# Patient Record
Sex: MALE | Race: WHITE | Employment: OTHER | URBAN - NONMETROPOLITAN AREA
[De-identification: names, ages, dates, MRNs, and addresses within clinical notes are randomized per-mention and may not be internally consistent; named-entity substitution may affect disease eponyms.]

---

## 2017-01-01 ENCOUNTER — OFFICE VISIT (OUTPATIENT)
Dept: INTERNAL MEDICINE | Age: 77
End: 2017-01-01
Payer: MEDICARE

## 2017-01-01 ENCOUNTER — TELEPHONE (OUTPATIENT)
Dept: INTERNAL MEDICINE | Age: 77
End: 2017-01-01

## 2017-01-01 ENCOUNTER — OFFICE VISIT (OUTPATIENT)
Dept: URGENT CARE | Age: 77
End: 2017-01-01
Payer: MEDICARE

## 2017-01-01 VITALS
BODY MASS INDEX: 24.14 KG/M2 | TEMPERATURE: 97.7 F | WEIGHT: 182.13 LBS | SYSTOLIC BLOOD PRESSURE: 167 MMHG | DIASTOLIC BLOOD PRESSURE: 68 MMHG | HEIGHT: 73 IN | HEART RATE: 61 BPM | OXYGEN SATURATION: 96 % | RESPIRATION RATE: 18 BRPM

## 2017-01-01 VITALS
RESPIRATION RATE: 20 BRPM | DIASTOLIC BLOOD PRESSURE: 60 MMHG | HEIGHT: 73 IN | WEIGHT: 183 LBS | SYSTOLIC BLOOD PRESSURE: 154 MMHG | OXYGEN SATURATION: 97 % | BODY MASS INDEX: 24.25 KG/M2 | HEART RATE: 67 BPM

## 2017-01-01 DIAGNOSIS — D64.9 LOW HEMOGLOBIN: Primary | ICD-10-CM

## 2017-01-01 DIAGNOSIS — H81.10 BENIGN POSITIONAL VERTIGO, UNSPECIFIED LATERALITY: ICD-10-CM

## 2017-01-01 DIAGNOSIS — Z79.4 TYPE 2 DIABETES MELLITUS WITHOUT COMPLICATION, WITH LONG-TERM CURRENT USE OF INSULIN (HCC): ICD-10-CM

## 2017-01-01 DIAGNOSIS — E11.9 TYPE 2 DIABETES MELLITUS WITHOUT COMPLICATION, WITH LONG-TERM CURRENT USE OF INSULIN (HCC): ICD-10-CM

## 2017-01-01 DIAGNOSIS — E78.5 HYPERLIPIDEMIA, UNSPECIFIED HYPERLIPIDEMIA TYPE: ICD-10-CM

## 2017-01-01 DIAGNOSIS — E78.2 MIXED HYPERLIPIDEMIA: Chronic | ICD-10-CM

## 2017-01-01 DIAGNOSIS — E11.9 TYPE 2 DIABETES MELLITUS WITHOUT COMPLICATION, WITHOUT LONG-TERM CURRENT USE OF INSULIN (HCC): Primary | Chronic | ICD-10-CM

## 2017-01-01 DIAGNOSIS — R42 VERTIGO: Primary | ICD-10-CM

## 2017-01-01 DIAGNOSIS — J30.9 CHRONIC ALLERGIC RHINITIS, UNSPECIFIED SEASONALITY, UNSPECIFIED TRIGGER: Chronic | ICD-10-CM

## 2017-01-01 DIAGNOSIS — Z23 FLU VACCINE NEED: ICD-10-CM

## 2017-01-01 DIAGNOSIS — H65.93 BILATERAL SEROUS OTITIS MEDIA, UNSPECIFIED CHRONICITY: ICD-10-CM

## 2017-01-01 LAB
ALBUMIN SERPL-MCNC: 4.2 G/DL (ref 3.5–5.2)
ALP BLD-CCNC: 79 U/L (ref 40–130)
ALT SERPL-CCNC: 14 U/L (ref 5–41)
ANION GAP SERPL CALCULATED.3IONS-SCNC: 14 MMOL/L (ref 7–19)
AST SERPL-CCNC: 17 U/L (ref 5–40)
BILIRUB SERPL-MCNC: 0.4 MG/DL (ref 0.2–1.2)
BUN BLDV-MCNC: 19 MG/DL (ref 8–23)
CALCIUM SERPL-MCNC: 9.3 MG/DL (ref 8.8–10.2)
CHLORIDE BLD-SCNC: 103 MMOL/L (ref 98–111)
CHOLESTEROL, TOTAL: 147 MG/DL (ref 160–199)
CO2: 26 MMOL/L (ref 22–29)
CREAT SERPL-MCNC: 1 MG/DL (ref 0.5–1.2)
GFR NON-AFRICAN AMERICAN: >60
GLUCOSE BLD-MCNC: 141 MG/DL (ref 74–109)
HBA1C MFR BLD: 7.2 %
HCT VFR BLD CALC: 40.2 % (ref 42–52)
HDLC SERPL-MCNC: 42 MG/DL (ref 55–121)
HEMOCCULT STL QL: NEGATIVE
HEMOGLOBIN: 13 G/DL (ref 14–18)
LDL CHOLESTEROL CALCULATED: 94 MG/DL
MCH RBC QN AUTO: 29.5 PG (ref 27–31)
MCHC RBC AUTO-ENTMCNC: 32.3 G/DL (ref 33–37)
MCV RBC AUTO: 91.4 FL (ref 80–94)
PDW BLD-RTO: 12.8 % (ref 11.5–14.5)
PLATELET # BLD: 177 K/UL (ref 130–400)
PMV BLD AUTO: 11.1 FL (ref 9.4–12.4)
POTASSIUM SERPL-SCNC: 4.2 MMOL/L (ref 3.5–5)
PROSTATE SPECIFIC ANTIGEN: 0.62 NG/ML (ref 0–4)
RBC # BLD: 4.4 M/UL (ref 4.7–6.1)
SODIUM BLD-SCNC: 143 MMOL/L (ref 136–145)
TOTAL PROTEIN: 7 G/DL (ref 6.6–8.7)
TRIGL SERPL-MCNC: 57 MG/DL (ref 0–149)
WBC # BLD: 5 K/UL (ref 4.8–10.8)

## 2017-01-01 PROCEDURE — 1123F ACP DISCUSS/DSCN MKR DOCD: CPT | Performed by: INTERNAL MEDICINE

## 2017-01-01 PROCEDURE — 4040F PNEUMOC VAC/ADMIN/RCVD: CPT | Performed by: NURSE PRACTITIONER

## 2017-01-01 PROCEDURE — G8484 FLU IMMUNIZE NO ADMIN: HCPCS | Performed by: NURSE PRACTITIONER

## 2017-01-01 PROCEDURE — G8484 FLU IMMUNIZE NO ADMIN: HCPCS | Performed by: INTERNAL MEDICINE

## 2017-01-01 PROCEDURE — G0439 PPPS, SUBSEQ VISIT: HCPCS | Performed by: INTERNAL MEDICINE

## 2017-01-01 PROCEDURE — G8427 DOCREV CUR MEDS BY ELIG CLIN: HCPCS | Performed by: INTERNAL MEDICINE

## 2017-01-01 PROCEDURE — G8420 CALC BMI NORM PARAMETERS: HCPCS | Performed by: NURSE PRACTITIONER

## 2017-01-01 PROCEDURE — 1036F TOBACCO NON-USER: CPT | Performed by: NURSE PRACTITIONER

## 2017-01-01 PROCEDURE — 90662 IIV NO PRSV INCREASED AG IM: CPT | Performed by: INTERNAL MEDICINE

## 2017-01-01 PROCEDURE — 4040F PNEUMOC VAC/ADMIN/RCVD: CPT | Performed by: INTERNAL MEDICINE

## 2017-01-01 PROCEDURE — 99213 OFFICE O/P EST LOW 20 MIN: CPT | Performed by: NURSE PRACTITIONER

## 2017-01-01 PROCEDURE — G8427 DOCREV CUR MEDS BY ELIG CLIN: HCPCS | Performed by: NURSE PRACTITIONER

## 2017-01-01 PROCEDURE — 99213 OFFICE O/P EST LOW 20 MIN: CPT | Performed by: INTERNAL MEDICINE

## 2017-01-01 PROCEDURE — 1036F TOBACCO NON-USER: CPT | Performed by: INTERNAL MEDICINE

## 2017-01-01 PROCEDURE — G8420 CALC BMI NORM PARAMETERS: HCPCS | Performed by: INTERNAL MEDICINE

## 2017-01-01 PROCEDURE — G0008 ADMIN INFLUENZA VIRUS VAC: HCPCS | Performed by: INTERNAL MEDICINE

## 2017-01-01 PROCEDURE — 1123F ACP DISCUSS/DSCN MKR DOCD: CPT | Performed by: NURSE PRACTITIONER

## 2017-01-01 RX ORDER — PRAVASTATIN SODIUM 40 MG
40 TABLET ORAL NIGHTLY
Qty: 90 TABLET | Refills: 3 | Status: SHIPPED | OUTPATIENT
Start: 2017-01-01

## 2017-01-01 RX ORDER — FLUTICASONE PROPIONATE 50 MCG
1 SPRAY, SUSPENSION (ML) NASAL DAILY
Qty: 1 BOTTLE | Refills: 3 | Status: SHIPPED | OUTPATIENT
Start: 2017-01-01 | End: 2017-01-01 | Stop reason: ALTCHOICE

## 2017-01-01 RX ORDER — MECLIZINE HYDROCHLORIDE 25 MG/1
25 TABLET ORAL 3 TIMES DAILY PRN
Qty: 30 TABLET | Refills: 2 | Status: SHIPPED | OUTPATIENT
Start: 2017-01-01 | End: 2017-01-01

## 2017-01-01 RX ORDER — LEVOCETIRIZINE DIHYDROCHLORIDE 5 MG/1
5 TABLET, FILM COATED ORAL NIGHTLY
Qty: 30 TABLET | Refills: 2 | Status: SHIPPED | OUTPATIENT
Start: 2017-01-01 | End: 2017-01-01 | Stop reason: ALTCHOICE

## 2017-01-01 RX ORDER — MECLIZINE HYDROCHLORIDE 25 MG/1
25 TABLET ORAL 3 TIMES DAILY PRN
Status: ON HOLD | COMMUNITY
End: 2018-01-01 | Stop reason: ALTCHOICE

## 2017-01-01 RX ORDER — PRAVASTATIN SODIUM 40 MG
1 TABLET ORAL NIGHTLY
COMMUNITY
Start: 2017-08-23 | End: 2017-01-01 | Stop reason: SDUPTHER

## 2017-01-01 ASSESSMENT — ENCOUNTER SYMPTOMS
RESPIRATORY NEGATIVE: 1
SORE THROAT: 0
TROUBLE SWALLOWING: 0
RHINORRHEA: 0
COUGH: 0
SINUS PRESSURE: 0
CONSTIPATION: 0
BACK PAIN: 0
SHORTNESS OF BREATH: 0
DIARRHEA: 0
ABDOMINAL PAIN: 0
BLOOD IN STOOL: 0
NAUSEA: 0

## 2017-01-01 ASSESSMENT — LIFESTYLE VARIABLES: HOW OFTEN DO YOU HAVE A DRINK CONTAINING ALCOHOL: 0

## 2017-01-01 ASSESSMENT — PATIENT HEALTH QUESTIONNAIRE - PHQ9: SUM OF ALL RESPONSES TO PHQ QUESTIONS 1-9: 0

## 2017-01-01 ASSESSMENT — ANXIETY QUESTIONNAIRES: GAD7 TOTAL SCORE: 0

## 2017-01-18 ENCOUNTER — TRANSCRIBE ORDERS (OUTPATIENT)
Dept: ADMINISTRATIVE | Facility: HOSPITAL | Age: 77
End: 2017-01-18

## 2017-01-18 DIAGNOSIS — R51.9 ACUTE NONINTRACTABLE HEADACHE, UNSPECIFIED HEADACHE TYPE: Primary | ICD-10-CM

## 2017-01-18 DIAGNOSIS — H53.2 VERTICAL DIPLOPIA: ICD-10-CM

## 2017-01-18 DIAGNOSIS — R20.0 NUMBNESS AND TINGLING: ICD-10-CM

## 2017-01-18 DIAGNOSIS — R20.2 NUMBNESS AND TINGLING: ICD-10-CM

## 2017-01-18 DIAGNOSIS — H53.8 BLURRED VISION: ICD-10-CM

## 2017-01-19 ENCOUNTER — HOSPITAL ENCOUNTER (OUTPATIENT)
Dept: ULTRASOUND IMAGING | Facility: HOSPITAL | Age: 77
Discharge: HOME | End: 2017-01-19

## 2017-01-19 ENCOUNTER — HOSPITAL ENCOUNTER (OUTPATIENT)
Dept: MRI IMAGING | Facility: HOSPITAL | Age: 77
Discharge: HOME | End: 2017-01-19
Admitting: NURSE PRACTITIONER

## 2017-01-19 DIAGNOSIS — R20.2 NUMBNESS AND TINGLING: ICD-10-CM

## 2017-01-19 DIAGNOSIS — R20.0 NUMBNESS AND TINGLING: ICD-10-CM

## 2017-01-19 DIAGNOSIS — H53.2 VERTICAL DIPLOPIA: ICD-10-CM

## 2017-01-19 DIAGNOSIS — H53.8 BLURRED VISION: ICD-10-CM

## 2017-01-19 DIAGNOSIS — R51.9 ACUTE NONINTRACTABLE HEADACHE, UNSPECIFIED HEADACHE TYPE: ICD-10-CM

## 2017-01-19 PROCEDURE — 93880 EXTRACRANIAL BILAT STUDY: CPT

## 2017-01-19 PROCEDURE — 93880 EXTRACRANIAL BILAT STUDY: CPT | Performed by: SURGERY

## 2017-01-19 PROCEDURE — 70551 MRI BRAIN STEM W/O DYE: CPT

## 2017-10-06 ENCOUNTER — OFFICE VISIT (OUTPATIENT)
Dept: URGENT CARE | Age: 77
End: 2017-10-06
Payer: MEDICARE

## 2017-10-06 VITALS
DIASTOLIC BLOOD PRESSURE: 70 MMHG | BODY MASS INDEX: 23.72 KG/M2 | TEMPERATURE: 97.6 F | OXYGEN SATURATION: 96 % | WEIGHT: 179 LBS | RESPIRATION RATE: 18 BRPM | HEIGHT: 73 IN | SYSTOLIC BLOOD PRESSURE: 148 MMHG | HEART RATE: 54 BPM

## 2017-10-06 DIAGNOSIS — H65.03 BILATERAL ACUTE SEROUS OTITIS MEDIA, RECURRENCE NOT SPECIFIED: Primary | ICD-10-CM

## 2017-10-06 DIAGNOSIS — R42 VERTIGO: ICD-10-CM

## 2017-10-06 PROCEDURE — 99212 OFFICE O/P EST SF 10 MIN: CPT | Performed by: NURSE PRACTITIONER

## 2017-10-06 RX ORDER — MECLIZINE HYDROCHLORIDE 25 MG/1
25 TABLET ORAL 3 TIMES DAILY PRN
Qty: 30 TABLET | Refills: 0 | Status: SHIPPED | OUTPATIENT
Start: 2017-10-06 | End: 2017-01-01 | Stop reason: SDUPTHER

## 2017-10-06 RX ORDER — AMOXICILLIN AND CLAVULANATE POTASSIUM 875; 125 MG/1; MG/1
1 TABLET, FILM COATED ORAL EVERY 12 HOURS
Qty: 20 TABLET | Refills: 0 | Status: SHIPPED | OUTPATIENT
Start: 2017-10-06 | End: 2017-10-16

## 2017-10-06 RX ORDER — FLUTICASONE PROPIONATE 50 MCG
1 SPRAY, SUSPENSION (ML) NASAL DAILY
Qty: 1 BOTTLE | Refills: 3 | Status: SHIPPED | OUTPATIENT
Start: 2017-10-06 | End: 2017-01-01 | Stop reason: ALTCHOICE

## 2017-10-06 ASSESSMENT — ENCOUNTER SYMPTOMS
GASTROINTESTINAL NEGATIVE: 1
SINUS PRESSURE: 0
RESPIRATORY NEGATIVE: 1
SORE THROAT: 0

## 2017-10-06 NOTE — PROGRESS NOTES
use antivert as needed for dizziness. Advised symptomatic treatment. If patient is not improving or developing any new/worsening symptoms then RTC, prn or go to ER. Patient is to follow up with PCP as needed. He verbalizes understanding. No orders of the defined types were placed in this encounter. No Follow-up on file. No orders of the defined types were placed in this encounter. Orders Placed This Encounter   Medications    amoxicillin-clavulanate (AUGMENTIN) 875-125 MG per tablet     Sig: Take 1 tablet by mouth every 12 hours for 10 days     Dispense:  20 tablet     Refill:  0    meclizine (ANTIVERT) 25 MG tablet     Sig: Take 1 tablet by mouth 3 times daily as needed for Dizziness     Dispense:  30 tablet     Refill:  0    fluticasone (FLONASE) 50 MCG/ACT nasal spray     Si spray by Nasal route daily     Dispense:  1 Bottle     Refill:  3       Patient given educational materials - see patient instructions. Discussed use, benefit, and side effects of prescribed medications. All patient questions answered. Pt voiced understanding. Reviewed health maintenance. Instructed to continue current medications, diet and exercise. Patient agreed with treatment plan. Follow up as directed. Patient Instructions        Vertigo: Care Instructions  Your Care Instructions  Vertigo is the feeling that you or your surroundings are moving when there is no actual movement. It is often described as a feeling of spinning, whirling, falling, or tilting. Vertigo may make you vomit or feel nauseated. You may have trouble standing or walking and may lose your balance. Vertigo is often related to an inner ear problem, but it can have other more serious causes. If vertigo continues, you may need more tests to find its cause. Follow-up care is a key part of your treatment and safety. Be sure to make and go to all appointments, and call your doctor if you are having problems.  Its also a good idea to know your test results and keep a list of the medicines you take. How can you care for yourself at home? · Do not lie flat on your back. Prop yourself up slightly. This may reduce the spinning feeling. Keep your eyes open. · Move slowly so that you do not fall. · If your doctor recommends medicine, take it exactly as directed. · Do not drive while you are having vertigo. Certain exercises, called Rudd-Daroff exercises, can help decrease vertigo. To do Rudd-Daroff exercises:  · Sit on the edge of a bed or sofa and quickly lie down on the side that causes the worst vertigo. Lie on your side with your ear down. · Stay in this position for at least 30 seconds or until the vertigo goes away. · Sit up. If this causes vertigo, wait for it to stop. · Repeat the procedure on the other side. · Repeat this 10 times. Do these exercises 2 times a day until the vertigo is gone. When should you call for help? Call 911 anytime you think you may need emergency care. For example, call if:  · You passed out (lost consciousness). · You have symptoms of a stroke. These may include:  ¨ Sudden numbness, tingling, weakness, or loss of movement in your face, arm, or leg, especially on only one side of your body. ¨ Sudden vision changes. ¨ Sudden trouble speaking. ¨ Sudden confusion or trouble understanding simple statements. ¨ Sudden problems with walking or balance. ¨ A sudden, severe headache that is different from past headaches. Call your doctor now or seek immediate medical care if:  · Vertigo occurs with a fever, a headache, or ringing in your ears. · You have new or increased nausea and vomiting. Watch closely for changes in your health, and be sure to contact your doctor if:  · Vertigo gets worse or happens more often. · Vertigo has not gotten better after 2 weeks. Where can you learn more? Go to [email or web address] and sign in to your Mob[employer] account.  Enter J190 in the [address] Information box to learn more about \"Vertigo: Care Instructions. \"     If you do not have an account, please click on the \"Sign Up Now\" link. Current as of: July 29, 2016  Content Version: 11.3  © 6470-0939 [employer], Fullscreen. Care instructions adapted under license by [facility] ([facility]). If you have questions about a medical condition or this instruction, always ask your healthcare professional. [address] any warranty or liability for your use of this information. Vertigo: Exercises  Your Care Instructions  Here are some examples of typical rehabilitation exercises for your condition. Start each exercise slowly. Ease off the exercise if you start to have pain. Your doctor or physical therapist will tell you when you can start these exercises and which ones will work best for you. How to do the exercises  Note: Do these exercises twice a day. Try to progress to doing each head movement 15 to 20 times. Then try to do them with your eyes closed. Exercise 1    1. Stand with a chair in front of you and a wall behind you. If you begin to fall, you may use them for support. 2. Stand with your feet together and your arms at your sides. 3. Move your head up and down 10 times. Exercise 2    Move your head side to side 10 times. Exercise 3    Move your head diagonally up and down 10 times. Exercise 4    Move your head diagonally up and down 10 times on the other side. Follow-up care is a key part of your treatment and safety. Be sure to make and go to all appointments, and call your doctor if you are having problems. It's also a good idea to know your test results and keep a list of the medicines you take. Where can you learn more? Go to [email or web address] [email or web address] and sign in to your [employer] account. Enter F349 in the [employer] box to learn more about \"Vertigo: Exercises. \"     If you do not have an account, please click on the \"Sign Up Now\" link.   Current

## 2017-10-06 NOTE — MR AVS SNAPSHOT
amoxicillin-clavulanate 875-125 MG per tablet    fluticasone 50 MCG/ACT nasal spray    meclizine 25 MG tablet               Your Current Medications Are              amoxicillin-clavulanate (AUGMENTIN) 875-125 MG per tablet Take 1 tablet by mouth every 12 hours for 10 days    meclizine (ANTIVERT) 25 MG tablet Take 1 tablet by mouth 3 times daily as needed for Dizziness    fluticasone (FLONASE) 50 MCG/ACT nasal spray 1 spray by Nasal route daily    metFORMIN (GLUCOPHAGE) 500 MG tablet TAKE 1 TABLET IN THE MORNING & 2 TABLETS IN THE EVENING. pravastatin (PRAVACHOL) 10 MG tablet Take 10 mg by mouth daily      Allergies           No Known Allergies         Additional Information        Basic Information     Date Of Birth Sex Race Ethnicity Preferred Language    1940 Male White Non-/Non  English      Immunizations as of 10/6/2017     Name Date    Tdap (Boostrix, Adacel) 12/28/2015      Preventive Care        Date Due    Cholesterol Screening 5/15/1981    Zoster Vaccine 5/15/2001    Pneumococcal Vaccines (two) for all adults aged 72 and over (1 of 2 - PCV13) 5/15/2006    Yearly Flu Vaccine (1) 9/1/2017    Tetanus Combination Vaccine (2 - Td) 12/28/2025            GeoEyehart Signup           [employer] allows you to send messages to your doctor, view your test results, renew your prescriptions, schedule appointments, view visit notes, and more. How Do I Sign Up? 1. In your Internet browser, go to [email or web address]  2. Click on the Sign Up Now link in the Sign In box. You will see the New Member Sign Up page. 3. Enter your [employer] Access Code exactly as it appears below. You will not need to use this code after youve completed the sign-up process. If you do not sign up before the expiration date, you must request a new code. [employer] Access Code: [email or web address]  Expires: 12/5/2017  9:07 AM    4.  Enter your Social Security Number ([id number]) and Date of Birth

## 2017-10-06 NOTE — PATIENT INSTRUCTIONS
Vertigo: Care Instructions  Your Care Instructions  Vertigo is the feeling that you or your surroundings are moving when there is no actual movement. It is often described as a feeling of spinning, whirling, falling, or tilting. Vertigo may make you vomit or feel nauseated. You may have trouble standing or walking and may lose your balance. Vertigo is often related to an inner ear problem, but it can have other more serious causes. If vertigo continues, you may need more tests to find its cause. Follow-up care is a key part of your treatment and safety. Be sure to make and go to all appointments, and call your doctor if you are having problems. Its also a good idea to know your test results and keep a list of the medicines you take. How can you care for yourself at home? · Do not lie flat on your back. Prop yourself up slightly. This may reduce the spinning feeling. Keep your eyes open. · Move slowly so that you do not fall. · If your doctor recommends medicine, take it exactly as directed. · Do not drive while you are having vertigo. Certain exercises, called Rudd-Daroff exercises, can help decrease vertigo. To do Rudd-Daroff exercises:  · Sit on the edge of a bed or sofa and quickly lie down on the side that causes the worst vertigo. Lie on your side with your ear down. · Stay in this position for at least 30 seconds or until the vertigo goes away. · Sit up. If this causes vertigo, wait for it to stop. · Repeat the procedure on the other side. · Repeat this 10 times. Do these exercises 2 times a day until the vertigo is gone. When should you call for help? Call 911 anytime you think you may need emergency care. For example, call if:  · You passed out (lost consciousness). · You have symptoms of a stroke. These may include:  ¨ Sudden numbness, tingling, weakness, or loss of movement in your face, arm, or leg, especially on only one side of your body. ¨ Sudden vision changes.   ¨ Sudden trouble speaking. ¨ Sudden confusion or trouble understanding simple statements. ¨ Sudden problems with walking or balance. ¨ A sudden, severe headache that is different from past headaches. Call your doctor now or seek immediate medical care if:  · Vertigo occurs with a fever, a headache, or ringing in your ears. · You have new or increased nausea and vomiting. Watch closely for changes in your health, and be sure to contact your doctor if:  · Vertigo gets worse or happens more often. · Vertigo has not gotten better after 2 weeks. Where can you learn more? Go to https://CEPA Safe [email or web address]."[email or web address].". org and sign in to your [employer] account. Enter T746 in the [place] box to learn more about \"Vertigo: Care Instructions. \"     If you do not have an account, please click on the \"Sign Up Now\" link. Current as of: July 29, 2016  Content Version: 11.3  © 1255-6488 [employer]. Care instructions adapted under license by [facility] ([facility]). If you have questions about a medical condition or this instruction, always ask your healthcare professional. [place] [address] any warranty or liability for your use of this information. Vertigo: Exercises  Your Care Instructions  Here are some examples of typical rehabilitation exercises for your condition. Start each exercise slowly. Ease off the exercise if you start to have pain. Your doctor or physical therapist will tell you when you can start these exercises and which ones will work best for you. How to do the exercises  Note: Do these exercises twice a day. Try to progress to doing each head movement 15 to 20 times. Then try to do them with your eyes closed. Exercise 1    1. Stand with a chair in front of you and a wall behind you. If you begin to fall, you may use them for support. 2. Stand with your feet together and your arms at your sides. 3. Move your head up and down 10 times.   Exercise 2    Move your head side to

## 2017-10-25 DIAGNOSIS — Z79.4 TYPE 2 DIABETES MELLITUS WITHOUT COMPLICATION, WITH LONG-TERM CURRENT USE OF INSULIN (HCC): ICD-10-CM

## 2017-10-25 DIAGNOSIS — E78.5 HYPERLIPIDEMIA, UNSPECIFIED HYPERLIPIDEMIA TYPE: Primary | ICD-10-CM

## 2017-10-25 DIAGNOSIS — E11.9 TYPE 2 DIABETES MELLITUS WITHOUT COMPLICATION, WITH LONG-TERM CURRENT USE OF INSULIN (HCC): ICD-10-CM

## 2017-11-06 NOTE — PROGRESS NOTES
(bilateral). Respiratory: Negative. Cardiovascular: Negative. Neurological: Positive for dizziness. Objective:     Physical Exam   Constitutional: He is oriented to person, place, and time. He appears well-developed and well-nourished. No distress. HENT:   Head: Normocephalic and atraumatic. Right Ear: Hearing, external ear and ear canal normal. A middle ear effusion is present. Left Ear: Hearing, external ear and ear canal normal. Tympanic membrane is not erythematous. A middle ear effusion is present. Nose: Nose normal.   Mouth/Throat: Uvula is midline and oropharynx is clear and moist.   Eyes: Pupils are equal, round, and reactive to light. Neck: Neck supple. Cardiovascular: Normal rate, regular rhythm and normal heart sounds. Pulmonary/Chest: Effort normal and breath sounds normal. No respiratory distress. He has no wheezes. He has no rales. Lymphadenopathy:     He has no cervical adenopathy. Neurological: He is alert and oriented to person, place, and time. Skin: Skin is warm. No rash noted. No erythema. Psychiatric: He has a normal mood and affect. His speech is normal and behavior is normal. Judgment and thought content normal.   Nursing note and vitals reviewed. BP (!) 167/68 (Site: Left Arm, Position: Sitting, Cuff Size: Small Adult)   Pulse 61   Temp 97.7 °F (36.5 °C) (Oral)   Resp 18   Ht 6' 1\" (1.854 m)   Wt 182 lb 2 oz (82.6 kg)   SpO2 96%   BMI 24.03 kg/m²     Assessment:      1. Vertigo     2. Bilateral serous otitis media, unspecified chronicity         Plan:     Discussed diagnosis and treatment with patient. Explained that his TMs arent erythematous this time so I am sending him xyzal and flonase to use to help symptoms. I am also sending him some more meclizine to use as needed for dizziness. Advised symptomatic treatment. Instructed to monitor fever and treat as needed.  If patient is not improving or developing any new/worsening symptoms then RTC, prn or go to ER. Patient is to follow up with PCP. Patient verbalizes understanding. No orders of the defined types were placed in this encounter. No Follow-up on file. No orders of the defined types were placed in this encounter. Orders Placed This Encounter   Medications    levocetirizine (XYZAL ALLERGY 24HR) 5 MG tablet     Sig: Take 1 tablet by mouth nightly     Dispense:  30 tablet     Refill:  2    fluticasone (FLONASE) 50 MCG/ACT nasal spray     Si spray by Nasal route daily     Dispense:  1 Bottle     Refill:  3    meclizine (ANTIVERT) 25 MG tablet     Sig: Take 1 tablet by mouth 3 times daily as needed for Dizziness     Dispense:  30 tablet     Refill:  2       Patient given educational materials - see patient instructions. Discussed use, benefit, and side effects of prescribed medications. All patient questions answered. Pt voiced understanding. Reviewed health maintenance. Instructed to continue current medications, diet and exercise. Patient agreed with treatment plan. Follow up as directed. Patient Instructions     Patient Education        Vertigo: Care Instructions  Your Care Instructions  Vertigo is the feeling that you or your surroundings are moving when there is no actual movement. It is often described as a feeling of spinning, whirling, falling, or tilting. Vertigo may make you vomit or feel nauseated. You may have trouble standing or walking and may lose your balance. Vertigo is often related to an inner ear problem, but it can have other more serious causes. If vertigo continues, you may need more tests to find its cause. Follow-up care is a key part of your treatment and safety. Be sure to make and go to all appointments, and call your doctor if you are having problems. Its also a good idea to know your test results and keep a list of the medicines you take. How can you care for yourself at home? · Do not lie flat on your back.  Prop yourself up slightly. This may reduce the spinning feeling. Keep your eyes open. · Move slowly so that you do not fall. · If your doctor recommends medicine, take it exactly as directed. · Do not drive while you are having vertigo. Certain exercises, called Rudd-Daroff exercises, can help decrease vertigo. To do Rudd-Daroff exercises:  · Sit on the edge of a bed or sofa and quickly lie down on the side that causes the worst vertigo. Lie on your side with your ear down. · Stay in this position for at least 30 seconds or until the vertigo goes away. · Sit up. If this causes vertigo, wait for it to stop. · Repeat the procedure on the other side. · Repeat this 10 times. Do these exercises 2 times a day until the vertigo is gone. When should you call for help? Call 911 anytime you think you may need emergency care. For example, call if:  · You passed out (lost consciousness). · You have symptoms of a stroke. These may include:  ¨ Sudden numbness, tingling, weakness, or loss of movement in your face, arm, or leg, especially on only one side of your body. ¨ Sudden vision changes. ¨ Sudden trouble speaking. ¨ Sudden confusion or trouble understanding simple statements. ¨ Sudden problems with walking or balance. ¨ A sudden, severe headache that is different from past headaches. Call your doctor now or seek immediate medical care if:  · Vertigo occurs with a fever, a headache, or ringing in your ears. · You have new or increased nausea and vomiting. Watch closely for changes in your health, and be sure to contact your doctor if:  · Vertigo gets worse or happens more often. · Vertigo has not gotten better after 2 weeks. Where can you learn more? Go to [email or web address]://Angiodroidjose [email or web address].[email or web address] and sign in to your [employer] account. Enter B448 in the [employer] box to learn more about \"Vertigo: Care Instructions. \"     If you do not have an account, please click on the \"Sign Up Now\" link.   Current as of: July 29, 2016  Content Version: 11.3  © 1804-2615 [employer], Apieron. Care instructions adapted under license by [facility] [address] If you have questions about a medical condition or this instruction, always ask your healthcare professional. Marily Bernal any warranty or liability for your use of this information. 1. Take meclizine as needed for dizziness  2. Take xyzal and flonase to help symptoms  3.  If patient is not improving or developing any new/worsening symptoms then RTC, prn or follow up with PCP        Electronically signed by MIGUEL ÁNGEL Rubin on 11/6/2017 at 2:52 PM

## 2017-11-20 PROBLEM — J30.9 CHRONIC ALLERGIC RHINITIS: Chronic | Status: ACTIVE | Noted: 2017-01-01

## 2017-11-20 PROBLEM — K21.9 GASTROESOPHAGEAL REFLUX DISEASE WITHOUT ESOPHAGITIS: Chronic | Status: ACTIVE | Noted: 2017-01-01

## 2017-11-20 PROBLEM — E78.5 HYPERLIPIDEMIA: Chronic | Status: ACTIVE | Noted: 2017-01-01

## 2017-11-20 PROBLEM — E11.9 TYPE 2 DIABETES MELLITUS WITHOUT COMPLICATION (HCC): Chronic | Status: ACTIVE | Noted: 2017-01-01

## 2017-11-20 PROBLEM — I10 HYPERTENSION: Chronic | Status: ACTIVE | Noted: 2017-01-01

## 2017-11-21 PROBLEM — H81.10 BENIGN POSITIONAL VERTIGO, UNSPECIFIED LATERALITY: Status: ACTIVE | Noted: 2017-01-01

## 2017-11-21 NOTE — PROGRESS NOTES
cognitive and functional/safety screening results are documented in additional note within this encounter. Wt Readings from Last 3 Encounters:   11/21/17 183 lb (83 kg)   11/06/17 182 lb 2 oz (82.6 kg)   10/06/17 179 lb (81.2 kg)     BP Readings from Last 3 Encounters:   11/21/17 (!) 154/72   11/06/17 (!) 167/68   10/06/17 (!) 148/70       Pertinent Physical Exam    Vitals:    11/21/17 1104   BP: (!) 154/72   Site: Left Arm   Position: Sitting   Pulse: 67   Resp: 20   SpO2: 97%   Weight: 183 lb (83 kg)   Height: 6' 1\" (1.854 m)     Body mass index is 24.14 kg/m². The following problems were reviewed today and where indicated follow up appointments were made and/or referrals ordered. Risk Factor Screenings with Interventions     Fall Risk:     Fall Risk Interventions:    · None indicated    Depression:     Depression Interventions:  · None indicated    Anxiety:     Anxiety Interventions:  · None indicated    Cognitive:     Cognitive Impairment Interventions:  · None indicated       Social History     Social History    Marital status:      Spouse name: Mathew Watts Number of children: 0    Years of education: 21     Occupational History    retired educator      Social History Main Topics    Smoking status: Never Smoker    Smokeless tobacco: Never Used    Alcohol use No    Drug use: No    Sexual activity: Yes     Partners: Female      Comment: wife     Other Topics Concern    Not on file     Social History Narrative    No narrative on file       Substance Abuse Interventions:  · None indicated    Health Risk Assessment:        General Health Risk Interventions:  · None indicated       There is no height or weight on file to calculate BMI.   Health Habits/Nutrition Interventions:  · None indicated       Hearing/Vision Interventions:  · None indicated       Safety Interventions:  · None indicated       ADL Interventions:  · None indicated    Personalized Preventive Plan     Immunization History Administered Date(s) Administered    Influenza, High Dose 11/02/2016    Tdap (Boostrix, Adacel) 12/28/2015       Health Maintenance Due   Topic Date Due    Zostavax vaccine  05/15/2001    Pneumococcal low/med risk (1 of 2 - PCV13) 05/15/2006    Flu vaccine (1) 09/01/2017   He is not interested in colonoscopy - last one probably more than ten years ago. Recommendations for Preventive Services Due: see orders. Recommended screening schedule for the next 5-10 years is provided to the patient in written form: see Patient Instructions/AVS.    EM/PROBLEM VISIT:      Chief Complaint   Patient presents with    Medicare AWV    Dizziness     patient had a problem with an ear infection about a month ago. He was seen at [facility] who gave him an antiboitic, Meclizine, and an allergy medication. He still has some dizziness occasionally. HPI:  Had some BPV recently  With positional vertigo. Treated at  with a  Potpourri of meds . Has gradually improved now. Diabetes  Diabetic control has been acceptable. Fasting blood sugars have been ranging low 100s  No hypoglycemic episodes. Diabetic diet has been followed adequately. Compliant with current medications. Hyperlipidemia    Lipids are currently being treated with pravastatin. No reported side effects from lipid medication. Low-fat diet is being followed. BP was 134/64 last week. Past Medical History:   Diagnosis Date    Chronic allergic rhinitis 11/20/2017    Gastroesophageal reflux disease without esophagitis 11/20/2017    Hyperlipidemia     Hypertension     Type 2 diabetes mellitus without complication ([facility] [address]       No past surgical history on file.    Family History   Problem Relation Age of Onset    Diabetes Mother       Social History     Social History    Marital status:      Spouse name: Musa Dick Number of children: 0    Years of education: 21     Occupational History    retired educator      Social History Main Topics Ear: External ear normal.   Nose: Nose normal.   Mouth/Throat: Oropharynx is clear and moist.   Tympanic membranes normal   Eyes: Conjunctivae and EOM are normal. Pupils are equal, round, and reactive to light. Right eye exhibits no discharge. Left eye exhibits no discharge. No scleral icterus. Fundiscopic exam normal   Neck: No JVD present. No thyromegaly present. Cardiovascular: Normal rate, regular rhythm, normal heart sounds and intact distal pulses. Exam reveals no gallop. No murmur heard. Pulses:       Dorsalis pedis pulses are 2+ on the right side, and 2+ on the left side. Posterior tibial pulses are 2+ on the right side, and 2+ on the left side. No Carotid or abdominal bruits   Pulmonary/Chest: Effort normal and breath sounds normal. No respiratory distress. He exhibits no tenderness. Abdominal: Soft. Bowel sounds are normal. He exhibits no distension and no mass. There is no tenderness. Genitourinary: Rectum normal and prostate normal. Rectal exam shows guaiac negative stool. Musculoskeletal: Normal range of motion. He exhibits no edema or tenderness. Lymphadenopathy:     He has no cervical adenopathy. Neurological: He is alert and oriented to person, place, and time. No cranial nerve deficit. Coordination normal.   No focal weakness   Skin: Skin is dry. No rash noted. No erythema. Psychiatric: He has a normal mood and affect. His behavior is normal. Judgment and thought content normal.   Vitals reviewed.        Lab Results   Component Value Date     11/08/2017    K 4.2 11/08/2017     11/08/2017    CO2 26 11/08/2017    BUN 19 11/08/2017    CREATININE 1.0 11/08/2017    GLUCOSE 141 (H) 11/08/2017    CALCIUM 9.3 11/08/2017    PROT 7.0 11/08/2017    LABALBU 4.2 11/08/2017    BILITOT 0.4 11/08/2017    ALKPHOS 79 11/08/2017    AST 17 11/08/2017    ALT 14 11/08/2017    LABGLOM >60 11/08/2017        Lab Results   Component Value Date    WBC 5.0 11/08/2017    HGB 13.0 (L) 11/08/2017    HCT 40.2 (L) 11/08/2017    MCV 91.4 11/08/2017     11/08/2017      Lab Results   Component Value Date    CHOL 147 (L) 11/08/2017     Lab Results   Component Value Date    TRIG 57 11/08/2017     Lab Results   Component Value Date    HDL 42 (L) 11/08/2017     Lab Results   Component Value Date    LDLCALC 94 11/08/2017      Lab Results   Component Value Date    LABA1C 7.2 (H) 11/08/2017     No results found for: EAG   No results found for: LABVLDL, VLDL  No results found for: [facility]   Lab Results   Component Value Date    PSA 0.62 11/08/2017    PSA was . 6 in May 2017     Patient Active Problem List   Diagnosis    Hypertension    Type 2 diabetes mellitus without complication (HCC)    Gastroesophageal reflux disease without esophagitis    Chronic allergic rhinitis    Hyperlipidemia    Benign positional vertigo, unspecified laterality       DIAGNOSES:    ICD-10-CM ICD-9-CM    1. Type 2 diabetes mellitus without complication, without long-term current use of insulin (HCC) E11.9 250.00 Comprehensive Metabolic Panel      Hemoglobin A1C   2. Flu vaccine need Z23 V04.81 INFLUENZA, HIGH DOSE, 65 YRS +, IM, PF, PREFILL SYR, 0.5ML (FLUZONE HD)   3. Benign positional vertigo, unspecified laterality H81.10 386.11    4. Mixed hyperlipidemia E78.2 272.2 Comprehensive Metabolic Panel      Lipid Panel   5. Chronic allergic rhinitis, unspecified seasonality, unspecified trigger J30.9 477.9         Orders Placed This Encounter   Procedures    INFLUENZA, HIGH DOSE, 65 YRS +, IM, PF, PREFILL SYR, 0.5ML (FLUZONE HD)    Comprehensive Metabolic Panel    Hemoglobin A1C    Lipid Panel          New Prescriptions    No medications on file        ASSESSMENT/PLAN:   His diabetic control is relatively stable on metformin and we will continue current dosing. He'll continue dietary measures. Lipids are well-controlled. He has had some benign positional vertigo that is gradually improved. Meclizine seems to help.

## 2017-11-21 NOTE — PATIENT INSTRUCTIONS
High dose flu vaccine given in (L) arm per Dr. Wellington  order. Pt tolerated well.  Maury Gates 48 #[id number] LOT #[id number] EXP 5/25/18

## 2017-11-21 NOTE — PROGRESS NOTES
Visit Information    Have you changed or started any medications since your last visit including any over-the-counter medicines, vitamins, or herbal medicines? no   Are you having any side effects from any of your medications? -  no  Have you stopped taking any of your medications? Is so, why? -  no    Have you seen any other physician or provider since your last visit? Yes - Records Requested [facility]  Have you had any other diagnostic tests since your last visit? No  Have you been seen in the emergency room and/or had an admission to a hospital since we last saw you? No  Have you had your routine dental cleaning in the past 6 months? no    Have you activated your [employer] account? If not, what are your barriers?  No: declined     Patient Care Team:  Jonathan Reyes MD as PCP - General (Internal Medicine)    Medical History Review  Past Medical, Family, and Social History reviewed and does not contribute to the patient presenting condition    Health Maintenance   Topic Date Due    Zostavax vaccine  05/15/2001    Pneumococcal low/med risk (1 of 2 - PCV13) 05/15/2006    Flu vaccine (1) 09/01/2017    Lipid screen  11/08/2022    DTaP/Tdap/Td vaccine (2 - Td) 12/28/2025

## 2017-12-08 NOTE — TELEPHONE ENCOUNTER
Notes Recorded by Margaret Cunningham on 12/8/2017 at 3:20 PM CST  I sw pt and he voiced understanding. He will come in in Feb to get CBC done. I will put in orders. CBC from [employer] has been scanned. ------    Notes Recorded by Isaiah Asif MD on 12/2/2017 at 2:41 PM CST  His stools for blood were all negative. He had very minimal hemoglobin decrease. Please get his last CBC out of all scripts if you would.  I would just check a CBC in a couple of months just to be sure it stable

## 2018-01-01 ENCOUNTER — HOSPITAL ENCOUNTER (OUTPATIENT)
Dept: RADIATION ONCOLOGY | Facility: HOSPITAL | Age: 78
Setting detail: RADIATION/ONCOLOGY SERIES
End: 2018-01-01

## 2018-01-01 ENCOUNTER — HOSPITAL ENCOUNTER (INPATIENT)
Age: 78
LOS: 11 days | Discharge: HOME HEALTH | DRG: 949 | End: 2018-09-25
Attending: PSYCHIATRY & NEUROLOGY | Admitting: PSYCHIATRY & NEUROLOGY
Payer: MEDICARE

## 2018-01-01 ENCOUNTER — TELEPHONE (OUTPATIENT)
Dept: NEUROLOGY | Age: 78
End: 2018-01-01

## 2018-01-01 ENCOUNTER — TELEPHONE (OUTPATIENT)
Dept: INTERNAL MEDICINE | Age: 78
End: 2018-01-01

## 2018-01-01 ENCOUNTER — HOSPITAL ENCOUNTER (OUTPATIENT)
Dept: MRI IMAGING | Age: 78
Discharge: HOME | End: 2018-09-05
Payer: MEDICARE

## 2018-01-01 ENCOUNTER — ANESTHESIA EVENT (OUTPATIENT)
Dept: PERIOP | Facility: HOSPITAL | Age: 78
End: 2018-01-01

## 2018-01-01 ENCOUNTER — APPOINTMENT (OUTPATIENT)
Dept: CT IMAGING | Facility: HOSPITAL | Age: 78
End: 2018-01-01

## 2018-01-01 ENCOUNTER — PREP FOR SURGERY (OUTPATIENT)
Dept: OTHER | Facility: HOSPITAL | Age: 78
End: 2018-01-01

## 2018-01-01 ENCOUNTER — HOSPITAL ENCOUNTER (OUTPATIENT)
Dept: VASCULAR LAB | Age: 78
Discharge: HOME | End: 2018-09-05
Payer: MEDICARE

## 2018-01-01 ENCOUNTER — OFFICE VISIT (OUTPATIENT)
Dept: INTERNAL MEDICINE | Age: 78
End: 2018-01-01
Payer: MEDICARE

## 2018-01-01 ENCOUNTER — DOCUMENTATION (OUTPATIENT)
Dept: RADIATION ONCOLOGY | Facility: HOSPITAL | Age: 78
End: 2018-01-01

## 2018-01-01 ENCOUNTER — ANESTHESIA (OUTPATIENT)
Dept: PERIOP | Facility: HOSPITAL | Age: 78
End: 2018-01-01

## 2018-01-01 ENCOUNTER — CONSULT (OUTPATIENT)
Dept: RADIATION ONCOLOGY | Facility: HOSPITAL | Age: 78
End: 2018-01-01

## 2018-01-01 ENCOUNTER — HOSPITAL ENCOUNTER (INPATIENT)
Facility: HOSPITAL | Age: 78
LOS: 8 days | Discharge: NURSING, REHAB OR LONG-TERM CARE | End: 2018-09-14
Attending: NEUROLOGICAL SURGERY | Admitting: NEUROLOGICAL SURGERY

## 2018-01-01 ENCOUNTER — TELEPHONE (OUTPATIENT)
Dept: NEUROSURGERY | Age: 78
End: 2018-01-01

## 2018-01-01 ENCOUNTER — APPOINTMENT (OUTPATIENT)
Dept: MRI IMAGING | Facility: HOSPITAL | Age: 78
End: 2018-01-01

## 2018-01-01 ENCOUNTER — APPOINTMENT (OUTPATIENT)
Dept: ULTRASOUND IMAGING | Facility: HOSPITAL | Age: 78
End: 2018-01-01

## 2018-01-01 ENCOUNTER — OFFICE VISIT (OUTPATIENT)
Dept: NEUROSURGERY | Facility: CLINIC | Age: 78
End: 2018-01-01

## 2018-01-01 ENCOUNTER — TELEPHONE (OUTPATIENT)
Dept: INTERNAL MEDICINE CLINIC | Age: 78
End: 2018-01-01

## 2018-01-01 VITALS
HEIGHT: 72 IN | SYSTOLIC BLOOD PRESSURE: 118 MMHG | WEIGHT: 168 LBS | BODY MASS INDEX: 22.75 KG/M2 | DIASTOLIC BLOOD PRESSURE: 50 MMHG | HEART RATE: 64 BPM | OXYGEN SATURATION: 98 %

## 2018-01-01 VITALS
WEIGHT: 173.2 LBS | HEART RATE: 53 BPM | SYSTOLIC BLOOD PRESSURE: 120 MMHG | BODY MASS INDEX: 23.46 KG/M2 | TEMPERATURE: 97 F | DIASTOLIC BLOOD PRESSURE: 62 MMHG | HEIGHT: 72 IN | RESPIRATION RATE: 18 BRPM | OXYGEN SATURATION: 97 %

## 2018-01-01 VITALS — SYSTOLIC BLOOD PRESSURE: 120 MMHG | HEIGHT: 72 IN | DIASTOLIC BLOOD PRESSURE: 60 MMHG

## 2018-01-01 VITALS
OXYGEN SATURATION: 97 % | HEIGHT: 71 IN | SYSTOLIC BLOOD PRESSURE: 152 MMHG | BODY MASS INDEX: 25.34 KG/M2 | RESPIRATION RATE: 18 BRPM | DIASTOLIC BLOOD PRESSURE: 70 MMHG | WEIGHT: 181 LBS | HEART RATE: 69 BPM

## 2018-01-01 VITALS
DIASTOLIC BLOOD PRESSURE: 70 MMHG | WEIGHT: 179.6 LBS | SYSTOLIC BLOOD PRESSURE: 130 MMHG | HEIGHT: 71 IN | HEART RATE: 94 BPM | OXYGEN SATURATION: 95 % | BODY MASS INDEX: 25.15 KG/M2

## 2018-01-01 VITALS
DIASTOLIC BLOOD PRESSURE: 48 MMHG | RESPIRATION RATE: 16 BRPM | BODY MASS INDEX: 23.73 KG/M2 | HEIGHT: 72 IN | HEART RATE: 66 BPM | TEMPERATURE: 97.7 F | WEIGHT: 175.2 LBS | SYSTOLIC BLOOD PRESSURE: 116 MMHG | OXYGEN SATURATION: 97 %

## 2018-01-01 VITALS
SYSTOLIC BLOOD PRESSURE: 188 MMHG | BODY MASS INDEX: 24.58 KG/M2 | HEART RATE: 80 BPM | DIASTOLIC BLOOD PRESSURE: 90 MMHG | WEIGHT: 175.6 LBS | HEIGHT: 71 IN | OXYGEN SATURATION: 95 % | RESPIRATION RATE: 20 BRPM

## 2018-01-01 VITALS
BODY MASS INDEX: 23.43 KG/M2 | SYSTOLIC BLOOD PRESSURE: 120 MMHG | DIASTOLIC BLOOD PRESSURE: 60 MMHG | HEIGHT: 72 IN | WEIGHT: 173 LBS

## 2018-01-01 VITALS
SYSTOLIC BLOOD PRESSURE: 128 MMHG | BODY MASS INDEX: 23.86 KG/M2 | WEIGHT: 176.2 LBS | HEIGHT: 72 IN | DIASTOLIC BLOOD PRESSURE: 82 MMHG

## 2018-01-01 DIAGNOSIS — C71.9 GLIOBLASTOMA MULTIFORME OF BRAIN (HCC): ICD-10-CM

## 2018-01-01 DIAGNOSIS — K21.9 GASTROESOPHAGEAL REFLUX DISEASE WITHOUT ESOPHAGITIS: Primary | Chronic | ICD-10-CM

## 2018-01-01 DIAGNOSIS — F41.9 ANXIETY: ICD-10-CM

## 2018-01-01 DIAGNOSIS — I10 ESSENTIAL HYPERTENSION: Chronic | ICD-10-CM

## 2018-01-01 DIAGNOSIS — R42 VERTIGO: ICD-10-CM

## 2018-01-01 DIAGNOSIS — N28.89 LEFT RENAL MASS: Chronic | ICD-10-CM

## 2018-01-01 DIAGNOSIS — M51.9 LUMBAR DISC DISEASE: Chronic | ICD-10-CM

## 2018-01-01 DIAGNOSIS — I10 ESSENTIAL HYPERTENSION: Primary | Chronic | ICD-10-CM

## 2018-01-01 DIAGNOSIS — Z78.9 NON-SMOKER: ICD-10-CM

## 2018-01-01 DIAGNOSIS — E11.9 TYPE 2 DIABETES MELLITUS WITHOUT COMPLICATION, WITHOUT LONG-TERM CURRENT USE OF INSULIN (HCC): Chronic | ICD-10-CM

## 2018-01-01 DIAGNOSIS — F09 COGNITIVE DYSFUNCTION: Primary | ICD-10-CM

## 2018-01-01 DIAGNOSIS — D49.6 BRAIN TUMOR (HCC): Primary | ICD-10-CM

## 2018-01-01 DIAGNOSIS — C71.9 MALIGNANT NEOPLASM OF BRAIN (HCC): ICD-10-CM

## 2018-01-01 DIAGNOSIS — R26.81 UNSTEADY GAIT: ICD-10-CM

## 2018-01-01 DIAGNOSIS — R26.9 GAIT DISTURBANCE: ICD-10-CM

## 2018-01-01 DIAGNOSIS — Z12.5 PROSTATE CANCER SCREENING: ICD-10-CM

## 2018-01-01 DIAGNOSIS — N28.89 RENAL MASS, LEFT: ICD-10-CM

## 2018-01-01 DIAGNOSIS — C71.9 GLIOBLASTOMA MULTIFORME OF BRAIN (HCC): Primary | ICD-10-CM

## 2018-01-01 DIAGNOSIS — F09 COGNITIVE DYSFUNCTION: ICD-10-CM

## 2018-01-01 DIAGNOSIS — Z71.9 ENCOUNTER FOR CONSULTATION: ICD-10-CM

## 2018-01-01 DIAGNOSIS — D64.9 LOW HEMOGLOBIN: ICD-10-CM

## 2018-01-01 DIAGNOSIS — D49.6 NEOPLASM, BRAIN (HCC): Primary | ICD-10-CM

## 2018-01-01 DIAGNOSIS — M54.50 LUMBAR PAIN: ICD-10-CM

## 2018-01-01 DIAGNOSIS — Z74.09 IMPAIRED FUNCTIONAL MOBILITY, BALANCE, GAIT, AND ENDURANCE: ICD-10-CM

## 2018-01-01 DIAGNOSIS — IMO0001 NORMAL BODY MASS INDEX (BMI): ICD-10-CM

## 2018-01-01 DIAGNOSIS — C71.9 MALIGNANT NEOPLASM OF BRAIN (HCC): Primary | ICD-10-CM

## 2018-01-01 DIAGNOSIS — D43.2 NEOPLASM OF UNCERTAIN BEHAVIOR OF BRAIN (HCC): Primary | ICD-10-CM

## 2018-01-01 DIAGNOSIS — E78.2 MIXED HYPERLIPIDEMIA: Chronic | ICD-10-CM

## 2018-01-01 DIAGNOSIS — Z78.9 DECREASED INDEPENDENCE WITH ACTIVITIES OF DAILY LIVING: ICD-10-CM

## 2018-01-01 DIAGNOSIS — D49.6 BRAIN TUMOR (HCC): Chronic | ICD-10-CM

## 2018-01-01 DIAGNOSIS — Z98.890 S/P CRANIOTOMY: ICD-10-CM

## 2018-01-01 DIAGNOSIS — G47.00 INSOMNIA, UNSPECIFIED TYPE: ICD-10-CM

## 2018-01-01 DIAGNOSIS — R68.89 FORGETFULNESS: ICD-10-CM

## 2018-01-01 DIAGNOSIS — G93.89 BRAIN MASS: Primary | ICD-10-CM

## 2018-01-01 DIAGNOSIS — Z78.9 IMPAIRED MOBILITY AND ADLS: ICD-10-CM
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
## 2018-01-01 DIAGNOSIS — Z74.09 IMPAIRED MOBILITY AND ADLS: ICD-10-CM

## 2018-01-01 DIAGNOSIS — F41.9 ANXIETY DISORDER, UNSPECIFIED TYPE: ICD-10-CM

## 2018-01-01 DIAGNOSIS — D49.6 BRAIN NEOPLASM (HCC): Primary | Chronic | ICD-10-CM

## 2018-01-01 DIAGNOSIS — Z74.09 IMPAIRED MOBILITY: ICD-10-CM

## 2018-01-01 DIAGNOSIS — E11.9 TYPE 2 DIABETES MELLITUS WITHOUT COMPLICATION, WITHOUT LONG-TERM CURRENT USE OF INSULIN (HCC): Primary | Chronic | ICD-10-CM

## 2018-01-01 DIAGNOSIS — E78.5 HYPERLIPIDEMIA, UNSPECIFIED HYPERLIPIDEMIA TYPE: Chronic | ICD-10-CM

## 2018-01-01 DIAGNOSIS — Z23 FLU VACCINE NEED: ICD-10-CM

## 2018-01-01 DIAGNOSIS — G47.01 INSOMNIA DUE TO MEDICAL CONDITION: ICD-10-CM

## 2018-01-01 LAB
ABO GROUP BLD: NORMAL
ALBUMIN SERPL-MCNC: 3.1 G/DL (ref 3.5–5.2)
ALBUMIN SERPL-MCNC: 3.3 G/DL (ref 3.5–5.2)
ALBUMIN SERPL-MCNC: 3.5 G/DL (ref 3.5–5)
ALBUMIN SERPL-MCNC: 4 G/DL (ref 3.5–5)
ALBUMIN SERPL-MCNC: 4.3 G/DL (ref 3.5–5.2)
ALBUMIN SERPL-MCNC: 4.7 G/DL (ref 3.5–5.2)
ALBUMIN/GLOB SERPL: 1.5 G/DL (ref 1.1–2.5)
ALBUMIN/GLOB SERPL: 1.6 G/DL (ref 1.1–2.5)
ALP BLD-CCNC: 137 U/L (ref 40–130)
ALP BLD-CCNC: 72 U/L (ref 40–130)
ALP BLD-CCNC: 73 U/L (ref 40–130)
ALP BLD-CCNC: 79 U/L (ref 40–130)
ALP BLD-CCNC: 79 U/L (ref 40–130)
ALP BLD-CCNC: 90 U/L (ref 40–130)
ALP SERPL-CCNC: 60 U/L (ref 24–120)
ALP SERPL-CCNC: 94 U/L (ref 24–120)
ALT SERPL W P-5'-P-CCNC: 29 U/L (ref 0–54)
ALT SERPL W P-5'-P-CCNC: 34 U/L (ref 0–54)
ALT SERPL-CCNC: 13 U/L (ref 5–41)
ALT SERPL-CCNC: 18 U/L (ref 5–41)
ALT SERPL-CCNC: 19 U/L (ref 5–41)
ALT SERPL-CCNC: 26 U/L (ref 5–41)
ANION GAP SERPL CALCULATED.3IONS-SCNC: 10 MMOL/L (ref 4–13)
ANION GAP SERPL CALCULATED.3IONS-SCNC: 11 MMOL/L (ref 7–19)
ANION GAP SERPL CALCULATED.3IONS-SCNC: 13 MMOL/L (ref 4–13)
ANION GAP SERPL CALCULATED.3IONS-SCNC: 14 MMOL/L (ref 7–19)
ANION GAP SERPL CALCULATED.3IONS-SCNC: 15 MMOL/L (ref 7–19)
ANION GAP SERPL CALCULATED.3IONS-SCNC: 16 MMOL/L (ref 7–19)
ANION GAP SERPL CALCULATED.3IONS-SCNC: 8 MMOL/L (ref 4–13)
ANION GAP SERPL CALCULATED.3IONS-SCNC: 9 MMOL/L (ref 4–13)
APTT PPP: 27.2 SECONDS (ref 24.1–34.8)
AST SERPL-CCNC: 12 U/L (ref 5–40)
AST SERPL-CCNC: 13 U/L (ref 5–40)
AST SERPL-CCNC: 14 U/L (ref 5–40)
AST SERPL-CCNC: 16 U/L (ref 5–40)
AST SERPL-CCNC: 24 U/L (ref 7–45)
AST SERPL-CCNC: 27 U/L (ref 7–45)
BASOPHILS # BLD AUTO: 0.01 10*3/MM3 (ref 0–0.2)
BASOPHILS # BLD AUTO: 0.02 10*3/MM3 (ref 0–0.2)
BASOPHILS ABSOLUTE: 0 K/UL (ref 0–0.2)
BASOPHILS NFR BLD AUTO: 0.1 % (ref 0–2)
BASOPHILS NFR BLD AUTO: 0.3 % (ref 0–2)
BASOPHILS RELATIVE PERCENT: 0.1 % (ref 0–1)
BASOPHILS RELATIVE PERCENT: 0.5 % (ref 0–1)
BILIRUB SERPL-MCNC: 0.3 MG/DL (ref 0.2–1.2)
BILIRUB SERPL-MCNC: 0.3 MG/DL (ref 0.2–1.2)
BILIRUB SERPL-MCNC: 0.4 MG/DL (ref 0.2–1.2)
BILIRUB SERPL-MCNC: 0.5 MG/DL (ref 0.1–1)
BILIRUB SERPL-MCNC: 0.5 MG/DL (ref 0.1–1)
BILIRUB SERPL-MCNC: <0.2 MG/DL (ref 0.2–1.2)
BILIRUB UR QL STRIP: NEGATIVE
BILIRUBIN URINE: NEGATIVE
BLD GP AB SCN SERPL QL: NEGATIVE
BLOOD, URINE: NEGATIVE
BUN BLD-MCNC: 18 MG/DL (ref 5–21)
BUN BLD-MCNC: 20 MG/DL (ref 5–21)
BUN BLD-MCNC: 20 MG/DL (ref 5–21)
BUN BLD-MCNC: 22 MG/DL (ref 5–21)
BUN BLD-MCNC: 24 MG/DL (ref 5–21)
BUN BLD-MCNC: 25 MG/DL (ref 5–21)
BUN BLDV-MCNC: 17 MG/DL (ref 8–23)
BUN BLDV-MCNC: 21 MG/DL (ref 8–23)
BUN BLDV-MCNC: 22 MG/DL (ref 8–23)
BUN BLDV-MCNC: 22 MG/DL (ref 8–23)
BUN BLDV-MCNC: 25 MG/DL (ref 8–23)
BUN BLDV-MCNC: 29 MG/DL (ref 8–23)
BUN/CREAT SERPL: 20.5 (ref 7–25)
BUN/CREAT SERPL: 21.8 (ref 7–25)
BUN/CREAT SERPL: 22.1 (ref 7–25)
BUN/CREAT SERPL: 22.2 (ref 7–25)
BUN/CREAT SERPL: 22.7 (ref 7–25)
BUN/CREAT SERPL: 26.7 (ref 7–25)
CALCIUM SERPL-MCNC: 10 MG/DL (ref 8.8–10.2)
CALCIUM SERPL-MCNC: 8.6 MG/DL (ref 8.8–10.2)
CALCIUM SERPL-MCNC: 8.6 MG/DL (ref 8.8–10.2)
CALCIUM SERPL-MCNC: 8.7 MG/DL (ref 8.8–10.2)
CALCIUM SERPL-MCNC: 8.8 MG/DL (ref 8.8–10.2)
CALCIUM SERPL-MCNC: 9 MG/DL (ref 8.8–10.2)
CALCIUM SPEC-SCNC: 7.9 MG/DL (ref 8.4–10.4)
CALCIUM SPEC-SCNC: 8.1 MG/DL (ref 8.4–10.4)
CALCIUM SPEC-SCNC: 8.4 MG/DL (ref 8.4–10.4)
CALCIUM SPEC-SCNC: 8.4 MG/DL (ref 8.4–10.4)
CALCIUM SPEC-SCNC: 8.6 MG/DL (ref 8.4–10.4)
CALCIUM SPEC-SCNC: 9.1 MG/DL (ref 8.4–10.4)
CHLORIDE BLD-SCNC: 101 MMOL/L (ref 98–111)
CHLORIDE BLD-SCNC: 101 MMOL/L (ref 98–111)
CHLORIDE BLD-SCNC: 103 MMOL/L (ref 98–111)
CHLORIDE BLD-SCNC: 106 MMOL/L (ref 98–111)
CHLORIDE BLD-SCNC: 95 MMOL/L (ref 98–111)
CHLORIDE BLD-SCNC: 99 MMOL/L (ref 98–111)
CHLORIDE SERPL-SCNC: 103 MMOL/L (ref 98–110)
CHLORIDE SERPL-SCNC: 103 MMOL/L (ref 98–110)
CHLORIDE SERPL-SCNC: 104 MMOL/L (ref 98–110)
CHLORIDE SERPL-SCNC: 105 MMOL/L (ref 98–110)
CHLORIDE SERPL-SCNC: 98 MMOL/L (ref 98–110)
CHLORIDE SERPL-SCNC: 98 MMOL/L (ref 98–110)
CHOLESTEROL, TOTAL: 129 MG/DL (ref 160–199)
CLARITY UR: CLEAR
CLARITY: CLEAR
CO2 SERPL-SCNC: 21 MMOL/L (ref 24–31)
CO2 SERPL-SCNC: 24 MMOL/L (ref 24–31)
CO2 SERPL-SCNC: 25 MMOL/L (ref 24–31)
CO2 SERPL-SCNC: 25 MMOL/L (ref 24–31)
CO2 SERPL-SCNC: 27 MMOL/L (ref 24–31)
CO2 SERPL-SCNC: 27 MMOL/L (ref 24–31)
CO2: 24 MMOL/L (ref 22–29)
CO2: 25 MMOL/L (ref 22–29)
CO2: 27 MMOL/L (ref 22–29)
CO2: 27 MMOL/L (ref 22–29)
COLOR UR: YELLOW
COLOR: YELLOW
CREAT BLD-MCNC: 0.88 MG/DL (ref 0.5–1.4)
CREAT BLD-MCNC: 0.88 MG/DL (ref 0.5–1.4)
CREAT BLD-MCNC: 0.9 MG/DL (ref 0.5–1.4)
CREAT BLD-MCNC: 0.9 MG/DL (ref 0.5–1.4)
CREAT BLD-MCNC: 1.01 MG/DL (ref 0.5–1.4)
CREAT BLD-MCNC: 1.13 MG/DL (ref 0.5–1.4)
CREAT SERPL-MCNC: 0.9 MG/DL (ref 0.5–1.2)
CREAT SERPL-MCNC: 1 MG/DL (ref 0.5–1.2)
CREAT SERPL-MCNC: 1 MG/DL (ref 0.5–1.2)
CREAT SERPL-MCNC: 1.2 MG/DL (ref 0.5–1.2)
CYTO UR: NORMAL
DEPRECATED RDW RBC AUTO: 38.3 FL (ref 40–54)
DEPRECATED RDW RBC AUTO: 38.8 FL (ref 40–54)
DEPRECATED RDW RBC AUTO: 38.9 FL (ref 40–54)
DEPRECATED RDW RBC AUTO: 39.2 FL (ref 40–54)
DEPRECATED RDW RBC AUTO: 39.7 FL (ref 40–54)
EHRLICHIA CHAFFEENSIS AB, IGG: NORMAL
EHRLICHIA CHAFFEENSIS AB, IGM: NORMAL
EOSINOPHIL # BLD AUTO: 0 10*3/MM3 (ref 0–0.7)
EOSINOPHIL # BLD AUTO: 0.11 10*3/MM3 (ref 0–0.7)
EOSINOPHIL NFR BLD AUTO: 0 % (ref 0–4)
EOSINOPHIL NFR BLD AUTO: 1.7 % (ref 0–4)
EOSINOPHILS ABSOLUTE: 0 K/UL (ref 0–0.6)
EOSINOPHILS ABSOLUTE: 0.2 K/UL (ref 0–0.6)
EOSINOPHILS RELATIVE PERCENT: 0.3 % (ref 0–5)
EOSINOPHILS RELATIVE PERCENT: 0.3 % (ref 0–5)
EOSINOPHILS RELATIVE PERCENT: 0.4 % (ref 0–5)
EOSINOPHILS RELATIVE PERCENT: 4.8 % (ref 0–5)
ERYTHROCYTE [DISTWIDTH] IN BLOOD BY AUTOMATED COUNT: 12.2 % (ref 12–15)
ERYTHROCYTE [DISTWIDTH] IN BLOOD BY AUTOMATED COUNT: 12.2 % (ref 12–15)
ERYTHROCYTE [DISTWIDTH] IN BLOOD BY AUTOMATED COUNT: 12.3 % (ref 12–15)
ERYTHROCYTE [DISTWIDTH] IN BLOOD BY AUTOMATED COUNT: 12.3 % (ref 12–15)
ERYTHROCYTE [DISTWIDTH] IN BLOOD BY AUTOMATED COUNT: 12.4 % (ref 12–15)
FOLATE: 13 NG/ML (ref 4.5–32.2)
GFR NON-AFRICAN AMERICAN: 59
GFR NON-AFRICAN AMERICAN: >60
GFR SERPL CREATININE-BSD FRML MDRD: 63 ML/MIN/1.73
GFR SERPL CREATININE-BSD FRML MDRD: 72 ML/MIN/1.73
GFR SERPL CREATININE-BSD FRML MDRD: 82 ML/MIN/1.73
GFR SERPL CREATININE-BSD FRML MDRD: 82 ML/MIN/1.73
GFR SERPL CREATININE-BSD FRML MDRD: 84 ML/MIN/1.73
GFR SERPL CREATININE-BSD FRML MDRD: 84 ML/MIN/1.73
GLOBULIN UR ELPH-MCNC: 2.4 GM/DL
GLOBULIN UR ELPH-MCNC: 2.5 GM/DL
GLUCOSE BLD-MCNC: 103 MG/DL (ref 70–99)
GLUCOSE BLD-MCNC: 107 MG/DL (ref 70–99)
GLUCOSE BLD-MCNC: 125 MG/DL (ref 70–99)
GLUCOSE BLD-MCNC: 126 MG/DL (ref 70–99)
GLUCOSE BLD-MCNC: 127 MG/DL (ref 70–99)
GLUCOSE BLD-MCNC: 128 MG/DL (ref 70–99)
GLUCOSE BLD-MCNC: 128 MG/DL (ref 70–99)
GLUCOSE BLD-MCNC: 135 MG/DL (ref 70–99)
GLUCOSE BLD-MCNC: 137 MG/DL (ref 70–99)
GLUCOSE BLD-MCNC: 138 MG/DL (ref 70–99)
GLUCOSE BLD-MCNC: 147 MG/DL (ref 70–99)
GLUCOSE BLD-MCNC: 150 MG/DL (ref 74–109)
GLUCOSE BLD-MCNC: 181 MG/DL (ref 70–99)
GLUCOSE BLD-MCNC: 184 MG/DL (ref 70–99)
GLUCOSE BLD-MCNC: 185 MG/DL (ref 70–100)
GLUCOSE BLD-MCNC: 188 MG/DL (ref 70–99)
GLUCOSE BLD-MCNC: 192 MG/DL (ref 70–99)
GLUCOSE BLD-MCNC: 195 MG/DL (ref 70–99)
GLUCOSE BLD-MCNC: 196 MG/DL (ref 70–99)
GLUCOSE BLD-MCNC: 198 MG/DL (ref 70–99)
GLUCOSE BLD-MCNC: 201 MG/DL (ref 74–109)
GLUCOSE BLD-MCNC: 205 MG/DL (ref 70–99)
GLUCOSE BLD-MCNC: 219 MG/DL (ref 70–99)
GLUCOSE BLD-MCNC: 221 MG/DL (ref 70–99)
GLUCOSE BLD-MCNC: 222 MG/DL (ref 70–99)
GLUCOSE BLD-MCNC: 222 MG/DL (ref 74–109)
GLUCOSE BLD-MCNC: 223 MG/DL (ref 70–99)
GLUCOSE BLD-MCNC: 223 MG/DL (ref 70–99)
GLUCOSE BLD-MCNC: 224 MG/DL (ref 70–100)
GLUCOSE BLD-MCNC: 230 MG/DL (ref 70–99)
GLUCOSE BLD-MCNC: 233 MG/DL (ref 70–99)
GLUCOSE BLD-MCNC: 241 MG/DL (ref 70–99)
GLUCOSE BLD-MCNC: 242 MG/DL (ref 70–100)
GLUCOSE BLD-MCNC: 247 MG/DL (ref 70–99)
GLUCOSE BLD-MCNC: 249 MG/DL (ref 70–99)
GLUCOSE BLD-MCNC: 254 MG/DL (ref 70–99)
GLUCOSE BLD-MCNC: 258 MG/DL (ref 70–100)
GLUCOSE BLD-MCNC: 261 MG/DL (ref 70–99)
GLUCOSE BLD-MCNC: 261 MG/DL (ref 70–99)
GLUCOSE BLD-MCNC: 262 MG/DL (ref 74–109)
GLUCOSE BLD-MCNC: 265 MG/DL (ref 70–99)
GLUCOSE BLD-MCNC: 266 MG/DL (ref 70–99)
GLUCOSE BLD-MCNC: 275 MG/DL (ref 74–109)
GLUCOSE BLD-MCNC: 278 MG/DL (ref 70–99)
GLUCOSE BLD-MCNC: 283 MG/DL (ref 70–99)
GLUCOSE BLD-MCNC: 284 MG/DL (ref 70–99)
GLUCOSE BLD-MCNC: 302 MG/DL (ref 70–99)
GLUCOSE BLD-MCNC: 306 MG/DL (ref 70–99)
GLUCOSE BLD-MCNC: 320 MG/DL (ref 70–99)
GLUCOSE BLD-MCNC: 324 MG/DL (ref 70–99)
GLUCOSE BLD-MCNC: 383 MG/DL (ref 74–109)
GLUCOSE BLD-MCNC: 416 MG/DL (ref 70–99)
GLUCOSE BLD-MCNC: 469 MG/DL (ref 70–100)
GLUCOSE BLD-MCNC: 678 MG/DL (ref 70–100)
GLUCOSE BLD-MCNC: 678 MG/DL (ref 70–100)
GLUCOSE BLDC GLUCOMTR-MCNC: 106 MG/DL (ref 70–130)
GLUCOSE BLDC GLUCOMTR-MCNC: 121 MG/DL (ref 70–130)
GLUCOSE BLDC GLUCOMTR-MCNC: 162 MG/DL (ref 70–130)
GLUCOSE BLDC GLUCOMTR-MCNC: 163 MG/DL (ref 70–130)
GLUCOSE BLDC GLUCOMTR-MCNC: 163 MG/DL (ref 70–130)
GLUCOSE BLDC GLUCOMTR-MCNC: 165 MG/DL (ref 70–130)
GLUCOSE BLDC GLUCOMTR-MCNC: 185 MG/DL (ref 70–130)
GLUCOSE BLDC GLUCOMTR-MCNC: 193 MG/DL (ref 70–130)
GLUCOSE BLDC GLUCOMTR-MCNC: 208 MG/DL (ref 70–130)
GLUCOSE BLDC GLUCOMTR-MCNC: 216 MG/DL (ref 70–130)
GLUCOSE BLDC GLUCOMTR-MCNC: 216 MG/DL (ref 70–130)
GLUCOSE BLDC GLUCOMTR-MCNC: 218 MG/DL (ref 70–130)
GLUCOSE BLDC GLUCOMTR-MCNC: 219 MG/DL (ref 70–130)
GLUCOSE BLDC GLUCOMTR-MCNC: 221 MG/DL (ref 70–130)
GLUCOSE BLDC GLUCOMTR-MCNC: 223 MG/DL (ref 70–130)
GLUCOSE BLDC GLUCOMTR-MCNC: 227 MG/DL (ref 70–130)
GLUCOSE BLDC GLUCOMTR-MCNC: 228 MG/DL (ref 70–130)
GLUCOSE BLDC GLUCOMTR-MCNC: 229 MG/DL (ref 70–130)
GLUCOSE BLDC GLUCOMTR-MCNC: 248 MG/DL (ref 70–130)
GLUCOSE BLDC GLUCOMTR-MCNC: 261 MG/DL (ref 70–130)
GLUCOSE BLDC GLUCOMTR-MCNC: 263 MG/DL (ref 70–130)
GLUCOSE BLDC GLUCOMTR-MCNC: 266 MG/DL (ref 70–130)
GLUCOSE BLDC GLUCOMTR-MCNC: 269 MG/DL (ref 70–130)
GLUCOSE BLDC GLUCOMTR-MCNC: 273 MG/DL (ref 70–130)
GLUCOSE BLDC GLUCOMTR-MCNC: 275 MG/DL (ref 70–130)
GLUCOSE BLDC GLUCOMTR-MCNC: 290 MG/DL (ref 70–130)
GLUCOSE BLDC GLUCOMTR-MCNC: 298 MG/DL (ref 70–130)
GLUCOSE BLDC GLUCOMTR-MCNC: 301 MG/DL (ref 70–130)
GLUCOSE BLDC GLUCOMTR-MCNC: 326 MG/DL (ref 70–130)
GLUCOSE BLDC GLUCOMTR-MCNC: 339 MG/DL (ref 70–130)
GLUCOSE BLDC GLUCOMTR-MCNC: 346 MG/DL (ref 70–130)
GLUCOSE BLDC GLUCOMTR-MCNC: 379 MG/DL (ref 70–130)
GLUCOSE BLDC GLUCOMTR-MCNC: 383 MG/DL (ref 70–130)
GLUCOSE BLDC GLUCOMTR-MCNC: 388 MG/DL (ref 70–130)
GLUCOSE BLDC GLUCOMTR-MCNC: 389 MG/DL (ref 70–130)
GLUCOSE BLDC GLUCOMTR-MCNC: 409 MG/DL (ref 70–130)
GLUCOSE BLDC GLUCOMTR-MCNC: 411 MG/DL (ref 70–130)
GLUCOSE BLDC GLUCOMTR-MCNC: 412 MG/DL (ref 70–130)
GLUCOSE BLDC GLUCOMTR-MCNC: 438 MG/DL (ref 70–130)
GLUCOSE BLDC GLUCOMTR-MCNC: 464 MG/DL (ref 70–130)
GLUCOSE UR STRIP-MCNC: ABNORMAL MG/DL
GLUCOSE URINE: >=1000 MG/DL
HBA1C MFR BLD: 10.9 %
HBA1C MFR BLD: 7.1 %
HCT VFR BLD AUTO: 34.2 % (ref 40–52)
HCT VFR BLD AUTO: 34.5 % (ref 40–52)
HCT VFR BLD AUTO: 34.8 % (ref 40–52)
HCT VFR BLD AUTO: 38 % (ref 40–52)
HCT VFR BLD AUTO: 38.9 % (ref 40–52)
HCT VFR BLD CALC: 35.1 % (ref 42–52)
HCT VFR BLD CALC: 35.9 % (ref 42–52)
HCT VFR BLD CALC: 36.6 % (ref 42–52)
HCT VFR BLD CALC: 36.8 % (ref 42–52)
HCT VFR BLD CALC: 40.5 % (ref 42–52)
HCT VFR BLD CALC: 44.3 % (ref 42–52)
HDLC SERPL-MCNC: 41 MG/DL (ref 55–121)
HEMOGLOBIN: 11.5 G/DL (ref 14–18)
HEMOGLOBIN: 11.6 G/DL (ref 14–18)
HEMOGLOBIN: 11.7 G/DL (ref 14–18)
HEMOGLOBIN: 11.8 G/DL (ref 14–18)
HEMOGLOBIN: 13 G/DL (ref 14–18)
HEMOGLOBIN: 14.2 G/DL (ref 14–18)
HGB BLD-MCNC: 11.7 G/DL (ref 14–18)
HGB BLD-MCNC: 12.8 G/DL (ref 14–18)
HGB BLD-MCNC: 13.2 G/DL (ref 14–18)
HGB UR QL STRIP.AUTO: NEGATIVE
IMM GRANULOCYTES # BLD: 0.02 10*3/MM3 (ref 0–0.03)
IMM GRANULOCYTES # BLD: 0.05 10*3/MM3 (ref 0–0.03)
IMM GRANULOCYTES NFR BLD: 0.3 % (ref 0–5)
IMM GRANULOCYTES NFR BLD: 0.4 % (ref 0–5)
INR PPP: 0.95 (ref 0.91–1.09)
KETONES UR QL STRIP: NEGATIVE
KETONES, URINE: NEGATIVE MG/DL
LAB AP CASE REPORT: NORMAL
LAB AP DIAGNOSIS COMMENT: NORMAL
LDL CHOLESTEROL CALCULATED: 78 MG/DL
LEUKOCYTE ESTERASE UR QL STRIP.AUTO: NEGATIVE
LEUKOCYTE ESTERASE, URINE: NEGATIVE
LYME, EIA: 0.32 LIV (ref 0–1.2)
LYMPHOCYTES # BLD AUTO: 0.37 10*3/MM3 (ref 0.72–4.86)
LYMPHOCYTES # BLD AUTO: 0.75 10*3/MM3 (ref 0.72–4.86)
LYMPHOCYTES ABSOLUTE: 0.7 K/UL (ref 1.1–4.5)
LYMPHOCYTES ABSOLUTE: 0.8 K/UL (ref 1.1–4.5)
LYMPHOCYTES ABSOLUTE: 1 K/UL (ref 1.1–4.5)
LYMPHOCYTES ABSOLUTE: 1 K/UL (ref 1.1–4.5)
LYMPHOCYTES NFR BLD AUTO: 11.9 % (ref 15–45)
LYMPHOCYTES NFR BLD AUTO: 3.1 % (ref 15–45)
LYMPHOCYTES RELATIVE PERCENT: 10.8 % (ref 20–40)
LYMPHOCYTES RELATIVE PERCENT: 22.6 % (ref 20–40)
LYMPHOCYTES RELATIVE PERCENT: 6.9 % (ref 20–40)
LYMPHOCYTES RELATIVE PERCENT: 9.3 % (ref 20–40)
Lab: NORMAL
MCH RBC QN AUTO: 28.4 PG (ref 27–31)
MCH RBC QN AUTO: 28.4 PG (ref 27–31)
MCH RBC QN AUTO: 28.8 PG (ref 27–31)
MCH RBC QN AUTO: 28.8 PG (ref 28–32)
MCH RBC QN AUTO: 28.9 PG (ref 27–31)
MCH RBC QN AUTO: 29.4 PG (ref 28–32)
MCH RBC QN AUTO: 29.4 PG (ref 28–32)
MCH RBC QN AUTO: 29.5 PG (ref 28–32)
MCH RBC QN AUTO: 29.7 PG (ref 28–32)
MCHC RBC AUTO-ENTMCNC: 31.3 G/DL (ref 33–37)
MCHC RBC AUTO-ENTMCNC: 32 G/DL (ref 33–37)
MCHC RBC AUTO-ENTMCNC: 32.1 G/DL (ref 33–37)
MCHC RBC AUTO-ENTMCNC: 32.1 G/DL (ref 33–37)
MCHC RBC AUTO-ENTMCNC: 32.9 G/DL (ref 33–37)
MCHC RBC AUTO-ENTMCNC: 33 G/DL (ref 33–37)
MCHC RBC AUTO-ENTMCNC: 33.6 G/DL (ref 33–36)
MCHC RBC AUTO-ENTMCNC: 33.7 G/DL (ref 33–36)
MCHC RBC AUTO-ENTMCNC: 33.9 G/DL (ref 33–36)
MCHC RBC AUTO-ENTMCNC: 33.9 G/DL (ref 33–36)
MCHC RBC AUTO-ENTMCNC: 34.2 G/DL (ref 33–36)
MCV RBC AUTO: 85.4 FL (ref 82–95)
MCV RBC AUTO: 86.6 FL (ref 82–95)
MCV RBC AUTO: 86.8 FL (ref 82–95)
MCV RBC AUTO: 86.9 FL (ref 82–95)
MCV RBC AUTO: 87.4 FL (ref 82–95)
MCV RBC AUTO: 87.5 FL (ref 80–94)
MCV RBC AUTO: 87.8 FL (ref 80–94)
MCV RBC AUTO: 88.6 FL (ref 80–94)
MCV RBC AUTO: 88.8 FL (ref 80–94)
MCV RBC AUTO: 90 FL (ref 80–94)
MCV RBC AUTO: 92 FL (ref 80–94)
MONOCYTES # BLD AUTO: 0.4 10*3/MM3 (ref 0.19–1.3)
MONOCYTES # BLD AUTO: 0.71 10*3/MM3 (ref 0.19–1.3)
MONOCYTES ABSOLUTE: 0.3 K/UL (ref 0–0.9)
MONOCYTES ABSOLUTE: 0.3 K/UL (ref 0–0.9)
MONOCYTES ABSOLUTE: 0.5 K/UL (ref 0–0.9)
MONOCYTES ABSOLUTE: 0.6 K/UL (ref 0–0.9)
MONOCYTES NFR BLD AUTO: 5.9 % (ref 4–12)
MONOCYTES NFR BLD AUTO: 6.3 % (ref 4–12)
MONOCYTES RELATIVE PERCENT: 4.3 % (ref 0–10)
MONOCYTES RELATIVE PERCENT: 5.2 % (ref 0–10)
MONOCYTES RELATIVE PERCENT: 5.4 % (ref 0–10)
MONOCYTES RELATIVE PERCENT: 6.9 % (ref 0–10)
NEUTROPHILS # BLD AUTO: 10.87 10*3/MM3 (ref 1.87–8.4)
NEUTROPHILS # BLD AUTO: 5.02 10*3/MM3 (ref 1.87–8.4)
NEUTROPHILS ABSOLUTE: 10.4 K/UL (ref 1.5–7.5)
NEUTROPHILS ABSOLUTE: 2.7 K/UL (ref 1.5–7.5)
NEUTROPHILS ABSOLUTE: 6.7 K/UL (ref 1.5–7.5)
NEUTROPHILS ABSOLUTE: 7.7 K/UL (ref 1.5–7.5)
NEUTROPHILS NFR BLD AUTO: 79.5 % (ref 39–78)
NEUTROPHILS NFR BLD AUTO: 90.5 % (ref 39–78)
NEUTROPHILS RELATIVE PERCENT: 65 % (ref 50–65)
NEUTROPHILS RELATIVE PERCENT: 82.7 % (ref 50–65)
NEUTROPHILS RELATIVE PERCENT: 85.4 % (ref 50–65)
NEUTROPHILS RELATIVE PERCENT: 86.5 % (ref 50–65)
NITRITE UR QL STRIP: NEGATIVE
NITRITE, URINE: NEGATIVE
NRBC BLD MANUAL-RTO: 0 /100 WBC (ref 0–0)
NRBC BLD MANUAL-RTO: 0 /100 WBC (ref 0–0)
ORGANISM: ABNORMAL
PATH REPORT.FINAL DX SPEC: NORMAL
PATH REPORT.GROSS SPEC: NORMAL
PDW BLD-RTO: 12.3 % (ref 11.5–14.5)
PDW BLD-RTO: 12.4 % (ref 11.5–14.5)
PDW BLD-RTO: 12.5 % (ref 11.5–14.5)
PDW BLD-RTO: 12.6 % (ref 11.5–14.5)
PDW BLD-RTO: 12.6 % (ref 11.5–14.5)
PDW BLD-RTO: 13 % (ref 11.5–14.5)
PERFORMED ON: ABNORMAL
PH UA: 5.5
PH UR STRIP.AUTO: 7 [PH] (ref 5–8)
PLATELET # BLD AUTO: 164 10*3/MM3 (ref 130–400)
PLATELET # BLD AUTO: 169 10*3/MM3 (ref 130–400)
PLATELET # BLD AUTO: 175 10*3/MM3 (ref 130–400)
PLATELET # BLD AUTO: 190 10*3/MM3 (ref 130–400)
PLATELET # BLD AUTO: 191 10*3/MM3 (ref 130–400)
PLATELET # BLD: 136 K/UL (ref 130–400)
PLATELET # BLD: 148 K/UL (ref 130–400)
PLATELET # BLD: 155 K/UL (ref 130–400)
PLATELET # BLD: 163 K/UL (ref 130–400)
PLATELET # BLD: 168 K/UL (ref 130–400)
PLATELET # BLD: 231 K/UL (ref 130–400)
PMV BLD AUTO: 10.8 FL (ref 9.4–12.4)
PMV BLD AUTO: 10.9 FL (ref 6–12)
PMV BLD AUTO: 10.9 FL (ref 9.4–12.4)
PMV BLD AUTO: 11 FL (ref 9.4–12.4)
PMV BLD AUTO: 11.3 FL (ref 6–12)
PMV BLD AUTO: 11.3 FL (ref 6–12)
PMV BLD AUTO: 11.3 FL (ref 9.4–12.4)
PMV BLD AUTO: 11.3 FL (ref 9.4–12.4)
PMV BLD AUTO: 11.4 FL (ref 6–12)
PMV BLD AUTO: 11.5 FL (ref 6–12)
PMV BLD AUTO: 11.6 FL (ref 9.4–12.4)
POTASSIUM BLD-SCNC: 3.9 MMOL/L (ref 3.5–5.3)
POTASSIUM BLD-SCNC: 4.3 MMOL/L (ref 3.5–5.3)
POTASSIUM BLD-SCNC: 4.3 MMOL/L (ref 3.5–5.3)
POTASSIUM BLD-SCNC: 4.5 MMOL/L (ref 3.5–5.3)
POTASSIUM BLD-SCNC: 4.6 MMOL/L (ref 3.5–5.3)
POTASSIUM BLD-SCNC: 4.9 MMOL/L (ref 3.5–5.3)
POTASSIUM REFLEX MAGNESIUM: 4.2 MMOL/L (ref 3.5–5)
POTASSIUM REFLEX MAGNESIUM: 4.3 MMOL/L (ref 3.5–5)
POTASSIUM REFLEX MAGNESIUM: 4.5 MMOL/L (ref 3.5–5)
POTASSIUM SERPL-SCNC: 4.1 MMOL/L (ref 3.5–5)
POTASSIUM SERPL-SCNC: 4.2 MMOL/L (ref 3.5–5)
POTASSIUM SERPL-SCNC: 5.1 MMOL/L (ref 3.5–5)
PREALBUMIN: 20 MG/DL (ref 20–40)
PREALBUMIN: 22 MG/DL (ref 20–40)
PROT SERPL-MCNC: 5.9 G/DL (ref 6.3–8.7)
PROT SERPL-MCNC: 6.5 G/DL (ref 6.3–8.7)
PROT UR QL STRIP: NEGATIVE
PROTEIN UA: NEGATIVE MG/DL
PROTHROMBIN TIME: 13 SECONDS (ref 11.9–14.6)
RBC # BLD AUTO: 3.94 10*6/MM3 (ref 4.8–5.9)
RBC # BLD AUTO: 3.97 10*6/MM3 (ref 4.8–5.9)
RBC # BLD AUTO: 3.98 10*6/MM3 (ref 4.8–5.9)
RBC # BLD AUTO: 4.45 10*6/MM3 (ref 4.8–5.9)
RBC # BLD AUTO: 4.49 10*6/MM3 (ref 4.8–5.9)
RBC # BLD: 4 M/UL (ref 4.7–6.1)
RBC # BLD: 4.01 M/UL (ref 4.7–6.1)
RBC # BLD: 4.09 M/UL (ref 4.7–6.1)
RBC # BLD: 4.12 M/UL (ref 4.7–6.1)
RBC # BLD: 4.5 M/UL (ref 4.7–6.1)
RBC # BLD: 5 M/UL (ref 4.7–6.1)
RH BLD: POSITIVE
ROCKY MOUNTAIN SPOTTED FEVER AB IGM: ABNORMAL
ROCKY MOUNTAIN SPOTTED FEVER ANTIBODY IGG: ABNORMAL
SEDIMENTATION RATE, ERYTHROCYTE: 8 MM/HR (ref 0–15)
SODIUM BLD-SCNC: 132 MMOL/L (ref 135–145)
SODIUM BLD-SCNC: 135 MMOL/L (ref 135–145)
SODIUM BLD-SCNC: 136 MMOL/L (ref 136–145)
SODIUM BLD-SCNC: 137 MMOL/L (ref 135–145)
SODIUM BLD-SCNC: 137 MMOL/L (ref 136–145)
SODIUM BLD-SCNC: 137 MMOL/L (ref 136–145)
SODIUM BLD-SCNC: 138 MMOL/L (ref 135–145)
SODIUM BLD-SCNC: 139 MMOL/L (ref 136–145)
SODIUM BLD-SCNC: 141 MMOL/L (ref 136–145)
SODIUM BLD-SCNC: 146 MMOL/L (ref 136–145)
SP GR UR STRIP: >1.03 (ref 1–1.03)
SPECIFIC GRAVITY UA: 1.03
T&S EXPIRATION DATE: NORMAL
T4 FREE: 1.1 NG/DL (ref 0.9–1.7)
TOTAL PROTEIN: 5 G/DL (ref 6.6–8.7)
TOTAL PROTEIN: 5.2 G/DL (ref 6.6–8.7)
TOTAL PROTEIN: 5.5 G/DL (ref 6.6–8.7)
TOTAL PROTEIN: 5.6 G/DL (ref 6.6–8.7)
TOTAL PROTEIN: 6.6 G/DL (ref 6.6–8.7)
TOTAL PROTEIN: 7.4 G/DL (ref 6.6–8.7)
TRIGL SERPL-MCNC: 51 MG/DL (ref 0–149)
TSH SERPL DL<=0.05 MIU/L-ACNC: 0.34 UIU/ML (ref 0.27–4.2)
TSH SERPL DL<=0.05 MIU/L-ACNC: 3.33 UIU/ML (ref 0.27–4.2)
URINE CULTURE, ROUTINE: ABNORMAL
URINE CULTURE, ROUTINE: ABNORMAL
UROBILINOGEN UR QL STRIP: ABNORMAL
UROBILINOGEN, URINE: 0.2 E.U./DL
VITAMIN B-12: 264 PG/ML (ref 211–946)
VITAMIN B-12: 314 PG/ML (ref 211–946)
WBC # BLD: 12 K/UL (ref 4.8–10.8)
WBC # BLD: 4.2 K/UL (ref 4.8–10.8)
WBC # BLD: 7.4 K/UL (ref 4.8–10.8)
WBC # BLD: 7.9 K/UL (ref 4.8–10.8)
WBC # BLD: 8.7 K/UL (ref 4.8–10.8)
WBC # BLD: 9.3 K/UL (ref 4.8–10.8)
WBC NRBC COR # BLD: 12.01 10*3/MM3 (ref 4.8–10.8)
WBC NRBC COR # BLD: 12.23 10*3/MM3 (ref 4.8–10.8)
WBC NRBC COR # BLD: 13.56 10*3/MM3 (ref 4.8–10.8)
WBC NRBC COR # BLD: 6.32 10*3/MM3 (ref 4.8–10.8)
WBC NRBC COR # BLD: 9.75 10*3/MM3 (ref 4.8–10.8)

## 2018-01-01 PROCEDURE — 93880 EXTRACRANIAL BILAT STUDY: CPT

## 2018-01-01 PROCEDURE — C1713 ANCHOR/SCREW BN/BN,TIS/BN: HCPCS | Performed by: NEUROLOGICAL SURGERY

## 2018-01-01 PROCEDURE — 99213 OFFICE O/P EST LOW 20 MIN: CPT | Performed by: INTERNAL MEDICINE

## 2018-01-01 PROCEDURE — G8419 CALC BMI OUT NRM PARAM NOF/U: HCPCS | Performed by: INTERNAL MEDICINE

## 2018-01-01 PROCEDURE — 80053 COMPREHEN METABOLIC PANEL: CPT

## 2018-01-01 PROCEDURE — 80048 BASIC METABOLIC PNL TOTAL CA: CPT | Performed by: INTERNAL MEDICINE

## 2018-01-01 PROCEDURE — 94799 UNLISTED PULMONARY SVC/PX: CPT

## 2018-01-01 PROCEDURE — 99232 SBSQ HOSP IP/OBS MODERATE 35: CPT | Performed by: PSYCHIATRY & NEUROLOGY

## 2018-01-01 PROCEDURE — 94760 N-INVAS EAR/PLS OXIMETRY 1: CPT

## 2018-01-01 PROCEDURE — 97110 THERAPEUTIC EXERCISES: CPT

## 2018-01-01 PROCEDURE — 80053 COMPREHEN METABOLIC PANEL: CPT | Performed by: NURSE PRACTITIONER

## 2018-01-01 PROCEDURE — 25010000002 LORAZEPAM PER 2 MG: Performed by: NEUROLOGICAL SURGERY

## 2018-01-01 PROCEDURE — 88341 IMHCHEM/IMCYTCHM EA ADD ANTB: CPT

## 2018-01-01 PROCEDURE — 92522 EVALUATE SPEECH PRODUCTION: CPT

## 2018-01-01 PROCEDURE — 6370000000 HC RX 637 (ALT 250 FOR IP): Performed by: PSYCHIATRY & NEUROLOGY

## 2018-01-01 PROCEDURE — 97116 GAIT TRAINING THERAPY: CPT

## 2018-01-01 PROCEDURE — 99231 SBSQ HOSP IP/OBS SF/LOW 25: CPT | Performed by: NEUROLOGICAL SURGERY

## 2018-01-01 PROCEDURE — 97530 THERAPEUTIC ACTIVITIES: CPT

## 2018-01-01 PROCEDURE — 99233 SBSQ HOSP IP/OBS HIGH 50: CPT | Performed by: PSYCHIATRY & NEUROLOGY

## 2018-01-01 PROCEDURE — 99223 1ST HOSP IP/OBS HIGH 75: CPT | Performed by: PSYCHIATRY & NEUROLOGY

## 2018-01-01 PROCEDURE — 63710000001 INSULIN DETEMIR PER 5 UNITS: Performed by: INTERNAL MEDICINE

## 2018-01-01 PROCEDURE — 97535 SELF CARE MNGMENT TRAINING: CPT

## 2018-01-01 PROCEDURE — 82962 GLUCOSE BLOOD TEST: CPT

## 2018-01-01 PROCEDURE — 61510 CRNEC TREPH EXC BRN TUM STTL: CPT | Performed by: NEUROLOGICAL SURGERY

## 2018-01-01 PROCEDURE — 97167 OT EVAL HIGH COMPLEX 60 MIN: CPT

## 2018-01-01 PROCEDURE — G8420 CALC BMI NORM PARAMETERS: HCPCS | Performed by: INTERNAL MEDICINE

## 2018-01-01 PROCEDURE — 1180000000 HC REHAB R&B

## 2018-01-01 PROCEDURE — 82948 REAGENT STRIP/BLOOD GLUCOSE: CPT

## 2018-01-01 PROCEDURE — 80048 BASIC METABOLIC PNL TOTAL CA: CPT | Performed by: NURSE PRACTITIONER

## 2018-01-01 PROCEDURE — 63710000001 INSULIN LISPRO (HUMAN) PER 5 UNITS: Performed by: INTERNAL MEDICINE

## 2018-01-01 PROCEDURE — G8427 DOCREV CUR MEDS BY ELIG CLIN: HCPCS | Performed by: INTERNAL MEDICINE

## 2018-01-01 PROCEDURE — 88321 CONSLTJ&REPRT SLD PREP ELSWR: CPT | Performed by: NEUROLOGICAL SURGERY

## 2018-01-01 PROCEDURE — 72148 MRI LUMBAR SPINE W/O DYE: CPT

## 2018-01-01 PROCEDURE — 6360000002 HC RX W HCPCS: Performed by: PSYCHIATRY & NEUROLOGY

## 2018-01-01 PROCEDURE — 63710000001 INSULIN REGULAR HUMAN PER 5 UNITS: Performed by: ANESTHESIOLOGY

## 2018-01-01 PROCEDURE — 82607 VITAMIN B-12: CPT

## 2018-01-01 PROCEDURE — 0 GADOBENATE DIMEGLUMINE 529 MG/ML SOLUTION: Performed by: NEUROLOGICAL SURGERY

## 2018-01-01 PROCEDURE — 87186 SC STD MICRODIL/AGAR DIL: CPT

## 2018-01-01 PROCEDURE — 74177 CT ABD & PELVIS W/CONTRAST: CPT

## 2018-01-01 PROCEDURE — 97168 OT RE-EVAL EST PLAN CARE: CPT

## 2018-01-01 PROCEDURE — 80053 COMPREHEN METABOLIC PANEL: CPT | Performed by: NEUROLOGICAL SURGERY

## 2018-01-01 PROCEDURE — 84134 ASSAY OF PREALBUMIN: CPT

## 2018-01-01 PROCEDURE — 25010000002 SUCCINYLCHOLINE PER 20 MG: Performed by: NURSE ANESTHETIST, CERTIFIED REGISTERED

## 2018-01-01 PROCEDURE — 25010000003 CEFAZOLIN PER 500 MG: Performed by: NURSE PRACTITIONER

## 2018-01-01 PROCEDURE — 96125 COGNITIVE TEST BY HC PRO: CPT

## 2018-01-01 PROCEDURE — 99024 POSTOP FOLLOW-UP VISIT: CPT | Performed by: NURSE PRACTITIONER

## 2018-01-01 PROCEDURE — 97165 OT EVAL LOW COMPLEX 30 MIN: CPT

## 2018-01-01 PROCEDURE — 81003 URINALYSIS AUTO W/O SCOPE: CPT | Performed by: NEUROLOGICAL SURGERY

## 2018-01-01 PROCEDURE — 97127 HC SP THER IVNTJ W/FOCUS COG FUNCJ: CPT

## 2018-01-01 PROCEDURE — 76775 US EXAM ABDO BACK WALL LIM: CPT

## 2018-01-01 PROCEDURE — 25010000002 DEXAMETHASONE PER 1 MG: Performed by: NEUROLOGICAL SURGERY

## 2018-01-01 PROCEDURE — G8978 MOBILITY CURRENT STATUS: HCPCS

## 2018-01-01 PROCEDURE — 99215 OFFICE O/P EST HI 40 MIN: CPT | Performed by: INTERNAL MEDICINE

## 2018-01-01 PROCEDURE — 36415 COLL VENOUS BLD VENIPUNCTURE: CPT

## 2018-01-01 PROCEDURE — 88307 TISSUE EXAM BY PATHOLOGIST: CPT | Performed by: NEUROLOGICAL SURGERY

## 2018-01-01 PROCEDURE — G0009 ADMIN PNEUMOCOCCAL VACCINE: HCPCS | Performed by: INTERNAL MEDICINE

## 2018-01-01 PROCEDURE — 85027 COMPLETE CBC AUTOMATED: CPT | Performed by: NURSE PRACTITIONER

## 2018-01-01 PROCEDURE — 6360000002 HC RX W HCPCS: Performed by: NURSE PRACTITIONER

## 2018-01-01 PROCEDURE — 88341 IMHCHEM/IMCYTCHM EA ADD ANTB: CPT | Performed by: NEUROLOGICAL SURGERY

## 2018-01-01 PROCEDURE — 4040F PNEUMOC VAC/ADMIN/RCVD: CPT | Performed by: INTERNAL MEDICINE

## 2018-01-01 PROCEDURE — 99239 HOSP IP/OBS DSCHRG MGMT >30: CPT | Performed by: PSYCHIATRY & NEUROLOGY

## 2018-01-01 PROCEDURE — G8988 SELF CARE GOAL STATUS: HCPCS | Performed by: OCCUPATIONAL THERAPIST

## 2018-01-01 PROCEDURE — 86900 BLOOD TYPING SEROLOGIC ABO: CPT | Performed by: NEUROLOGICAL SURGERY

## 2018-01-01 PROCEDURE — 63710000001 INSULIN LISPRO (HUMAN) PER 5 UNITS: Performed by: FAMILY MEDICINE

## 2018-01-01 PROCEDURE — 86901 BLOOD TYPING SEROLOGIC RH(D): CPT | Performed by: NEUROLOGICAL SURGERY

## 2018-01-01 PROCEDURE — 25010000002 IOPAMIDOL 61 % SOLUTION: Performed by: NEUROLOGICAL SURGERY

## 2018-01-01 PROCEDURE — G8979 MOBILITY GOAL STATUS: HCPCS

## 2018-01-01 PROCEDURE — 88342 IMHCHEM/IMCYTCHM 1ST ANTB: CPT | Performed by: NEUROLOGICAL SURGERY

## 2018-01-01 PROCEDURE — 84443 ASSAY THYROID STIM HORMONE: CPT

## 2018-01-01 PROCEDURE — 99496 TRANSJ CARE MGMT HIGH F2F 7D: CPT | Performed by: INTERNAL MEDICINE

## 2018-01-01 PROCEDURE — 00U20JZ SUPPLEMENT DURA MATER WITH SYNTHETIC SUBSTITUTE, OPEN APPROACH: ICD-10-PCS | Performed by: NEUROLOGICAL SURGERY

## 2018-01-01 PROCEDURE — 25010000002 LEVETIRACETAM IN NACL 0.82% 500 MG/100ML SOLUTION: Performed by: NURSE PRACTITIONER

## 2018-01-01 PROCEDURE — G8987 SELF CARE CURRENT STATUS: HCPCS | Performed by: OCCUPATIONAL THERAPIST

## 2018-01-01 PROCEDURE — G0463 HOSPITAL OUTPT CLINIC VISIT: HCPCS | Performed by: RADIOLOGY

## 2018-01-01 PROCEDURE — 70553 MRI BRAIN STEM W/O & W/DYE: CPT

## 2018-01-01 PROCEDURE — 25010000002 MORPHINE SULFATE (PF) 2 MG/ML SOLUTION: Performed by: NURSE PRACTITIONER

## 2018-01-01 PROCEDURE — 63710000001 DEXAMETHASONE PER 0.25 MG: Performed by: NURSE PRACTITIONER

## 2018-01-01 PROCEDURE — 85025 COMPLETE CBC W/AUTO DIFF WBC: CPT

## 2018-01-01 PROCEDURE — 99214 OFFICE O/P EST MOD 30 MIN: CPT | Performed by: INTERNAL MEDICINE

## 2018-01-01 PROCEDURE — A9577 INJ MULTIHANCE: HCPCS | Performed by: NEUROLOGICAL SURGERY

## 2018-01-01 PROCEDURE — 88381 MICRODISSECTION MANUAL: CPT

## 2018-01-01 PROCEDURE — 90662 IIV NO PRSV INCREASED AG IM: CPT | Performed by: INTERNAL MEDICINE

## 2018-01-01 PROCEDURE — 1111F DSCHRG MED/CURRENT MED MERGE: CPT | Performed by: INTERNAL MEDICINE

## 2018-01-01 PROCEDURE — 88331 PATH CONSLTJ SURG 1 BLK 1SPC: CPT | Performed by: PATHOLOGY

## 2018-01-01 PROCEDURE — 81003 URINALYSIS AUTO W/O SCOPE: CPT

## 2018-01-01 PROCEDURE — 63710000001 INSULIN LISPRO (HUMAN) PER 5 UNITS: Performed by: NEUROLOGICAL SURGERY

## 2018-01-01 PROCEDURE — 6360000004 HC RX CONTRAST MEDICATION: Performed by: INTERNAL MEDICINE

## 2018-01-01 PROCEDURE — 97162 PT EVAL MOD COMPLEX 30 MIN: CPT

## 2018-01-01 PROCEDURE — 85025 COMPLETE CBC W/AUTO DIFF WBC: CPT | Performed by: NURSE PRACTITIONER

## 2018-01-01 PROCEDURE — 1123F ACP DISCUSS/DSCN MKR DOCD: CPT | Performed by: INTERNAL MEDICINE

## 2018-01-01 PROCEDURE — 71260 CT THORAX DX C+: CPT

## 2018-01-01 PROCEDURE — 25010000002 DEXAMETHASONE PER 1 MG: Performed by: NURSE PRACTITIONER

## 2018-01-01 PROCEDURE — 1036F TOBACCO NON-USER: CPT | Performed by: INTERNAL MEDICINE

## 2018-01-01 PROCEDURE — 99024 POSTOP FOLLOW-UP VISIT: CPT | Performed by: NEUROLOGICAL SURGERY

## 2018-01-01 PROCEDURE — 90670 PCV13 VACCINE IM: CPT | Performed by: INTERNAL MEDICINE

## 2018-01-01 PROCEDURE — 25010000002 NEOSTIGMINE PER 0.5 MG: Performed by: NURSE ANESTHETIST, CERTIFIED REGISTERED

## 2018-01-01 PROCEDURE — 25010000002 PROPOFOL 10 MG/ML EMULSION: Performed by: NURSE ANESTHETIST, CERTIFIED REGISTERED

## 2018-01-01 PROCEDURE — 0 IOHEXOL 300 MG/ML SOLUTION: Performed by: NEUROLOGICAL SURGERY

## 2018-01-01 PROCEDURE — 86850 RBC ANTIBODY SCREEN: CPT | Performed by: NEUROLOGICAL SURGERY

## 2018-01-01 PROCEDURE — 83036 HEMOGLOBIN GLYCOSYLATED A1C: CPT | Performed by: INTERNAL MEDICINE

## 2018-01-01 PROCEDURE — 82947 ASSAY GLUCOSE BLOOD QUANT: CPT | Performed by: INTERNAL MEDICINE

## 2018-01-01 PROCEDURE — 88334 PATH CONSLTJ SURG CYTO XM EA: CPT | Performed by: NEUROLOGICAL SURGERY

## 2018-01-01 PROCEDURE — 25010000002 FENTANYL CITRATE (PF) 100 MCG/2ML SOLUTION: Performed by: ANESTHESIOLOGY

## 2018-01-01 PROCEDURE — 85027 COMPLETE CBC AUTOMATED: CPT

## 2018-01-01 PROCEDURE — 85025 COMPLETE CBC W/AUTO DIFF WBC: CPT | Performed by: NEUROLOGICAL SURGERY

## 2018-01-01 PROCEDURE — A9577 INJ MULTIHANCE: HCPCS | Performed by: INTERNAL MEDICINE

## 2018-01-01 PROCEDURE — 88313 SPECIAL STAINS GROUP 2: CPT | Performed by: NEUROLOGICAL SURGERY

## 2018-01-01 PROCEDURE — 99223 1ST HOSP IP/OBS HIGH 75: CPT | Performed by: NEUROLOGICAL SURGERY

## 2018-01-01 PROCEDURE — G0008 ADMIN INFLUENZA VIRUS VAC: HCPCS | Performed by: INTERNAL MEDICINE

## 2018-01-01 PROCEDURE — 2500000003 HC RX 250 WO HCPCS: Performed by: PSYCHIATRY & NEUROLOGY

## 2018-01-01 PROCEDURE — 1101F PT FALLS ASSESS-DOCD LE1/YR: CPT | Performed by: INTERNAL MEDICINE

## 2018-01-01 PROCEDURE — 25010000002 ONDANSETRON PER 1 MG: Performed by: NURSE ANESTHETIST, CERTIFIED REGISTERED

## 2018-01-01 PROCEDURE — 94664 DEMO&/EVAL PT USE INHALER: CPT

## 2018-01-01 PROCEDURE — 87086 URINE CULTURE/COLONY COUNT: CPT

## 2018-01-01 PROCEDURE — 84439 ASSAY OF FREE THYROXINE: CPT

## 2018-01-01 PROCEDURE — 97161 PT EVAL LOW COMPLEX 20 MIN: CPT

## 2018-01-01 PROCEDURE — 00B70ZZ EXCISION OF CEREBRAL HEMISPHERE, OPEN APPROACH: ICD-10-PCS | Performed by: NEUROLOGICAL SURGERY

## 2018-01-01 PROCEDURE — 97164 PT RE-EVAL EST PLAN CARE: CPT

## 2018-01-01 PROCEDURE — 85610 PROTHROMBIN TIME: CPT | Performed by: NEUROLOGICAL SURGERY

## 2018-01-01 PROCEDURE — 85730 THROMBOPLASTIN TIME PARTIAL: CPT | Performed by: NEUROLOGICAL SURGERY

## 2018-01-01 PROCEDURE — 4A10X4G MONITORING OF CENTRAL NERVOUS ELECTRICAL ACTIVITY, INTRAOPERATIVE, EXTERNAL APPROACH: ICD-10-PCS | Performed by: NEUROLOGICAL SURGERY

## 2018-01-01 DEVICE — IMPLANTABLE DEVICE
Type: IMPLANTABLE DEVICE | Status: FUNCTIONAL
Brand: THINFLAP SYSTEM

## 2018-01-01 DEVICE — HEMO ABS GELFOAM SPNG PORCN SZ100: Type: IMPLANTABLE DEVICE | Status: FUNCTIONAL

## 2018-01-01 DEVICE — DURAGEN® PLUS DURAL REGENERATION MATRIX, 3 IN X 3 IN (7.5 CM X 7.5 CM)
Type: IMPLANTABLE DEVICE | Status: FUNCTIONAL
Brand: DURAGEN® PLUS

## 2018-01-01 DEVICE — HEMO ABS GELFOAM PWDR PORCN 1GM: Type: IMPLANTABLE DEVICE | Status: FUNCTIONAL

## 2018-01-01 RX ORDER — SODIUM CHLORIDE 9 MG/ML
75 INJECTION, SOLUTION INTRAVENOUS CONTINUOUS
Status: DISCONTINUED | OUTPATIENT
Start: 2018-01-01 | End: 2018-01-01

## 2018-01-01 RX ORDER — TRAZODONE HYDROCHLORIDE 50 MG/1
50 TABLET ORAL NIGHTLY
Status: DISCONTINUED | OUTPATIENT
Start: 2018-01-01 | End: 2018-01-01 | Stop reason: HOSPADM

## 2018-01-01 RX ORDER — NICOTINE POLACRILEX 4 MG
15 LOZENGE BUCCAL
Status: DISCONTINUED | OUTPATIENT
Start: 2018-01-01 | End: 2018-01-01 | Stop reason: HOSPADM

## 2018-01-01 RX ORDER — DEXAMETHASONE SODIUM PHOSPHATE 4 MG/ML
4 INJECTION, SOLUTION INTRA-ARTICULAR; INTRALESIONAL; INTRAMUSCULAR; INTRAVENOUS; SOFT TISSUE EVERY 8 HOURS
Status: DISCONTINUED | OUTPATIENT
Start: 2018-01-01 | End: 2018-01-01

## 2018-01-01 RX ORDER — PRAVASTATIN SODIUM 40 MG
40 TABLET ORAL NIGHTLY
COMMUNITY
Start: 2017-01-01

## 2018-01-01 RX ORDER — MAGNESIUM HYDROXIDE 1200 MG/15ML
LIQUID ORAL AS NEEDED
Status: DISCONTINUED | OUTPATIENT
Start: 2018-01-01 | End: 2018-01-01 | Stop reason: HOSPADM

## 2018-01-01 RX ORDER — DEXAMETHASONE 2 MG/1
2 TABLET ORAL EVERY 12 HOURS
Status: DISCONTINUED | OUTPATIENT
Start: 2018-01-01 | End: 2018-01-01

## 2018-01-01 RX ORDER — LEVETIRACETAM 500 MG/1
500 TABLET ORAL EVERY 12 HOURS SCHEDULED
COMMUNITY
Start: 2018-01-01

## 2018-01-01 RX ORDER — FAMOTIDINE 10 MG/ML
20 INJECTION, SOLUTION INTRAVENOUS
Status: DISCONTINUED | OUTPATIENT
Start: 2018-01-01 | End: 2018-01-01 | Stop reason: HOSPADM

## 2018-01-01 RX ORDER — HYDRALAZINE HYDROCHLORIDE 20 MG/ML
5 INJECTION INTRAMUSCULAR; INTRAVENOUS
Status: DISCONTINUED | OUTPATIENT
Start: 2018-01-01 | End: 2018-01-01 | Stop reason: HOSPADM

## 2018-01-01 RX ORDER — LEVOCETIRIZINE DIHYDROCHLORIDE 5 MG/1
5 TABLET, FILM COATED ORAL EVERY EVENING
COMMUNITY

## 2018-01-01 RX ORDER — DEXTROSE MONOHYDRATE 25 G/50ML
12.5 INJECTION, SOLUTION INTRAVENOUS PRN
Status: DISCONTINUED | OUTPATIENT
Start: 2018-01-01 | End: 2018-01-01 | Stop reason: HOSPADM

## 2018-01-01 RX ORDER — LIDOCAINE HYDROCHLORIDE 40 MG/ML
SOLUTION TOPICAL AS NEEDED
Status: DISCONTINUED | OUTPATIENT
Start: 2018-01-01 | End: 2018-01-01 | Stop reason: SURG

## 2018-01-01 RX ORDER — BISACODYL 10 MG
10 SUPPOSITORY, RECTAL RECTAL DAILY PRN
Status: DISCONTINUED | OUTPATIENT
Start: 2018-01-01 | End: 2018-01-01 | Stop reason: HOSPADM

## 2018-01-01 RX ORDER — TRAZODONE HYDROCHLORIDE 50 MG/1
50 TABLET ORAL NIGHTLY
Qty: 30 TABLET | Refills: 0 | Status: SHIPPED | OUTPATIENT
Start: 2018-01-01

## 2018-01-01 RX ORDER — LIDOCAINE HYDROCHLORIDE 20 MG/ML
INJECTION, SOLUTION INFILTRATION; PERINEURAL AS NEEDED
Status: DISCONTINUED | OUTPATIENT
Start: 2018-01-01 | End: 2018-01-01 | Stop reason: SURG

## 2018-01-01 RX ORDER — OXYCODONE AND ACETAMINOPHEN 10; 325 MG/1; MG/1
1 TABLET ORAL ONCE AS NEEDED
Status: DISCONTINUED | OUTPATIENT
Start: 2018-01-01 | End: 2018-01-01 | Stop reason: HOSPADM

## 2018-01-01 RX ORDER — SUFENTANIL CITRATE 50 UG/ML
INJECTION EPIDURAL; INTRAVENOUS AS NEEDED
Status: DISCONTINUED | OUTPATIENT
Start: 2018-01-01 | End: 2018-01-01 | Stop reason: SURG

## 2018-01-01 RX ORDER — DEXAMETHASONE 2 MG/1
2 TABLET ORAL 2 TIMES DAILY WITH MEALS
Qty: 60 TABLET | Refills: 0 | Status: SHIPPED | OUTPATIENT
Start: 2018-01-01 | End: 2018-01-01

## 2018-01-01 RX ORDER — SODIUM CHLORIDE 9 MG/ML
INJECTION, SOLUTION INTRAVENOUS AS NEEDED
Status: DISCONTINUED | OUTPATIENT
Start: 2018-01-01 | End: 2018-01-01 | Stop reason: HOSPADM

## 2018-01-01 RX ORDER — LEVETIRACETAM 5 MG/ML
500 INJECTION INTRAVASCULAR EVERY 12 HOURS
Status: DISCONTINUED | OUTPATIENT
Start: 2018-01-01 | End: 2018-01-01

## 2018-01-01 RX ORDER — INSULIN GLARGINE 100 [IU]/ML
20 INJECTION, SOLUTION SUBCUTANEOUS NIGHTLY
Status: DISCONTINUED | OUTPATIENT
Start: 2018-01-01 | End: 2018-01-01 | Stop reason: HOSPADM

## 2018-01-01 RX ORDER — MECLIZINE HYDROCHLORIDE 25 MG/1
25 TABLET ORAL 3 TIMES DAILY PRN
Status: ON HOLD | COMMUNITY
End: 2018-01-01

## 2018-01-01 RX ORDER — NICOTINE POLACRILEX 4 MG
15 LOZENGE BUCCAL PRN
Status: DISCONTINUED | OUTPATIENT
Start: 2018-01-01 | End: 2018-01-01 | Stop reason: HOSPADM

## 2018-01-01 RX ORDER — LORAZEPAM 0.5 MG/1
0.5 TABLET ORAL EVERY 8 HOURS PRN
Qty: 90 TABLET | Refills: 0 | Status: SHIPPED | OUTPATIENT
Start: 2018-01-01 | End: 2018-01-01

## 2018-01-01 RX ORDER — TRAZODONE HYDROCHLORIDE 50 MG/1
50 TABLET ORAL NIGHTLY
Qty: 30 TABLET | Refills: 1 | Status: ON HOLD | OUTPATIENT
Start: 2018-01-01 | End: 2018-01-01 | Stop reason: HOSPADM

## 2018-01-01 RX ORDER — HYDROCODONE BITARTRATE AND ACETAMINOPHEN 5; 325 MG/1; MG/1
1 TABLET ORAL EVERY 8 HOURS PRN
Status: ON HOLD | COMMUNITY
End: 2018-01-01 | Stop reason: HOSPADM

## 2018-01-01 RX ORDER — LORAZEPAM 2 MG/ML
1 INJECTION INTRAMUSCULAR EVERY 4 HOURS PRN
Status: DISCONTINUED | OUTPATIENT
Start: 2018-01-01 | End: 2018-01-01

## 2018-01-01 RX ORDER — MANNITOL 20 G/100ML
INJECTION, SOLUTION INTRAVENOUS CONTINUOUS PRN
Status: DISCONTINUED | OUTPATIENT
Start: 2018-01-01 | End: 2018-01-01 | Stop reason: SURG

## 2018-01-01 RX ORDER — TRAZODONE HYDROCHLORIDE 50 MG/1
50 TABLET ORAL NIGHTLY PRN
Status: DISCONTINUED | OUTPATIENT
Start: 2018-01-01 | End: 2018-01-01

## 2018-01-01 RX ORDER — LORAZEPAM 1 MG/1
0.5 TABLET ORAL EVERY 8 HOURS PRN
Status: ON HOLD | COMMUNITY
Start: 2018-01-01 | End: 2018-01-01

## 2018-01-01 RX ORDER — TRAZODONE HYDROCHLORIDE 50 MG/1
50 TABLET ORAL NIGHTLY
COMMUNITY
Start: 2018-01-01

## 2018-01-01 RX ORDER — LORAZEPAM 1 MG/1
1 TABLET ORAL
Status: COMPLETED | OUTPATIENT
Start: 2018-01-01 | End: 2018-01-01

## 2018-01-01 RX ORDER — GLYCOPYRROLATE 0.2 MG/ML
INJECTION INTRAMUSCULAR; INTRAVENOUS AS NEEDED
Status: DISCONTINUED | OUTPATIENT
Start: 2018-01-01 | End: 2018-01-01 | Stop reason: SURG

## 2018-01-01 RX ORDER — SENNA AND DOCUSATE SODIUM 50; 8.6 MG/1; MG/1
2 TABLET, FILM COATED ORAL 2 TIMES DAILY PRN
Status: DISCONTINUED | OUTPATIENT
Start: 2018-01-01 | End: 2018-01-01

## 2018-01-01 RX ORDER — PANTOPRAZOLE SODIUM 40 MG/1
40 TABLET, DELAYED RELEASE ORAL DAILY
Start: 2018-01-01

## 2018-01-01 RX ORDER — PANTOPRAZOLE SODIUM 40 MG/1
40 TABLET, DELAYED RELEASE ORAL DAILY
COMMUNITY
End: 2018-01-01

## 2018-01-01 RX ORDER — LORAZEPAM 1 MG/1
0.5 TABLET ORAL EVERY 8 HOURS PRN
Qty: 30 TABLET | Refills: 0 | Status: ON HOLD | OUTPATIENT
Start: 2018-01-01 | End: 2018-01-01 | Stop reason: HOSPADM

## 2018-01-01 RX ORDER — NICARDIPINE HYDROCHLORIDE 2.5 MG/ML
INJECTION INTRAVENOUS AS NEEDED
Status: DISCONTINUED | OUTPATIENT
Start: 2018-01-01 | End: 2018-01-01 | Stop reason: SURG

## 2018-01-01 RX ORDER — HYDROCODONE BITARTRATE AND ACETAMINOPHEN 5; 325 MG/1; MG/1
TABLET ORAL
COMMUNITY
Start: 2018-01-01 | End: 2018-01-01

## 2018-01-01 RX ORDER — MEPERIDINE HYDROCHLORIDE 25 MG/ML
12.5 INJECTION INTRAMUSCULAR; INTRAVENOUS; SUBCUTANEOUS
Status: DISCONTINUED | OUTPATIENT
Start: 2018-01-01 | End: 2018-01-01 | Stop reason: HOSPADM

## 2018-01-01 RX ORDER — DEXAMETHASONE 4 MG/1
4 TABLET ORAL EVERY 12 HOURS SCHEDULED
Status: DISCONTINUED | OUTPATIENT
Start: 2018-01-01 | End: 2018-01-01 | Stop reason: HOSPADM

## 2018-01-01 RX ORDER — LORAZEPAM 1 MG/1
0.5 TABLET ORAL EVERY 8 HOURS PRN
Qty: 90 TABLET | Refills: 0 | Status: SHIPPED | OUTPATIENT
Start: 2018-01-01 | End: 2018-01-01

## 2018-01-01 RX ORDER — FAMOTIDINE 20 MG/1
40 TABLET, FILM COATED ORAL DAILY
Status: DISCONTINUED | OUTPATIENT
Start: 2018-01-01 | End: 2018-01-01

## 2018-01-01 RX ORDER — MIDAZOLAM HYDROCHLORIDE 1 MG/ML
2 INJECTION INTRAMUSCULAR; INTRAVENOUS
Status: DISCONTINUED | OUTPATIENT
Start: 2018-01-01 | End: 2018-01-01 | Stop reason: HOSPADM

## 2018-01-01 RX ORDER — SUCCINYLCHOLINE CHLORIDE 20 MG/ML
INJECTION INTRAMUSCULAR; INTRAVENOUS AS NEEDED
Status: DISCONTINUED | OUTPATIENT
Start: 2018-01-01 | End: 2018-01-01 | Stop reason: SURG

## 2018-01-01 RX ORDER — DEXAMETHASONE 2 MG/1
2 TABLET ORAL EVERY 12 HOURS
Status: ON HOLD | COMMUNITY
End: 2018-01-01 | Stop reason: HOSPADM

## 2018-01-01 RX ORDER — FLUMAZENIL 0.1 MG/ML
0.2 INJECTION INTRAVENOUS AS NEEDED
Status: DISCONTINUED | OUTPATIENT
Start: 2018-01-01 | End: 2018-01-01 | Stop reason: HOSPADM

## 2018-01-01 RX ORDER — LABETALOL HYDROCHLORIDE 5 MG/ML
INJECTION, SOLUTION INTRAVENOUS AS NEEDED
Status: DISCONTINUED | OUTPATIENT
Start: 2018-01-01 | End: 2018-01-01 | Stop reason: SURG

## 2018-01-01 RX ORDER — DEXAMETHASONE SODIUM PHOSPHATE 4 MG/ML
4 INJECTION, SOLUTION INTRA-ARTICULAR; INTRALESIONAL; INTRAMUSCULAR; INTRAVENOUS; SOFT TISSUE EVERY 6 HOURS SCHEDULED
Status: DISCONTINUED | OUTPATIENT
Start: 2018-01-01 | End: 2018-01-01

## 2018-01-01 RX ORDER — DEXAMETHASONE 4 MG/1
2 TABLET ORAL EVERY 12 HOURS SCHEDULED
Start: 2018-01-01 | End: 2018-01-01

## 2018-01-01 RX ORDER — DEXTROSE MONOHYDRATE 50 MG/ML
100 INJECTION, SOLUTION INTRAVENOUS PRN
Status: DISCONTINUED | OUTPATIENT
Start: 2018-01-01 | End: 2018-01-01 | Stop reason: HOSPADM

## 2018-01-01 RX ORDER — LORAZEPAM 2 MG/ML
1 INJECTION INTRAMUSCULAR ONCE
Status: COMPLETED | OUTPATIENT
Start: 2018-01-01 | End: 2018-01-01

## 2018-01-01 RX ORDER — ONDANSETRON 2 MG/ML
4 INJECTION INTRAMUSCULAR; INTRAVENOUS EVERY 6 HOURS PRN
Status: DISCONTINUED | OUTPATIENT
Start: 2018-01-01 | End: 2018-01-01 | Stop reason: HOSPADM

## 2018-01-01 RX ORDER — FENTANYL CITRATE 50 UG/ML
25 INJECTION, SOLUTION INTRAMUSCULAR; INTRAVENOUS AS NEEDED
Status: DISCONTINUED | OUTPATIENT
Start: 2018-01-01 | End: 2018-01-01 | Stop reason: HOSPADM

## 2018-01-01 RX ORDER — NICOTINE POLACRILEX 4 MG
15 LOZENGE BUCCAL
Status: DISCONTINUED | OUTPATIENT
Start: 2018-01-01 | End: 2018-01-01 | Stop reason: SDUPTHER

## 2018-01-01 RX ORDER — HYDROCODONE BITARTRATE AND ACETAMINOPHEN 5; 325 MG/1; MG/1
1 TABLET ORAL EVERY 4 HOURS PRN
Status: DISCONTINUED | OUTPATIENT
Start: 2018-01-01 | End: 2018-01-01 | Stop reason: HOSPADM

## 2018-01-01 RX ORDER — BACITRACIN ZINC 500 [USP'U]/G
OINTMENT TOPICAL AS NEEDED
Status: DISCONTINUED | OUTPATIENT
Start: 2018-01-01 | End: 2018-01-01 | Stop reason: HOSPADM

## 2018-01-01 RX ORDER — HYDROCODONE BITARTRATE AND ACETAMINOPHEN 5; 325 MG/1; MG/1
1 TABLET ORAL EVERY 8 HOURS PRN
Qty: 90 TABLET | Refills: 0 | Status: SHIPPED | OUTPATIENT
Start: 2018-01-01 | End: 2018-01-01

## 2018-01-01 RX ORDER — SODIUM CHLORIDE 0.9 % (FLUSH) 0.9 %
1-10 SYRINGE (ML) INJECTION AS NEEDED
Status: DISCONTINUED | OUTPATIENT
Start: 2018-01-01 | End: 2018-01-01

## 2018-01-01 RX ORDER — SODIUM CHLORIDE 0.9 % (FLUSH) 0.9 %
1-10 SYRINGE (ML) INJECTION AS NEEDED
Status: DISCONTINUED | OUTPATIENT
Start: 2018-01-01 | End: 2018-01-01 | Stop reason: HOSPADM

## 2018-01-01 RX ORDER — SODIUM CHLORIDE, SODIUM LACTATE, POTASSIUM CHLORIDE, CALCIUM CHLORIDE 600; 310; 30; 20 MG/100ML; MG/100ML; MG/100ML; MG/100ML
30 INJECTION, SOLUTION INTRAVENOUS CONTINUOUS
Status: DISCONTINUED | OUTPATIENT
Start: 2018-01-01 | End: 2018-01-01

## 2018-01-01 RX ORDER — MIDAZOLAM HYDROCHLORIDE 1 MG/ML
1 INJECTION INTRAMUSCULAR; INTRAVENOUS
Status: DISCONTINUED | OUTPATIENT
Start: 2018-01-01 | End: 2018-01-01 | Stop reason: HOSPADM

## 2018-01-01 RX ORDER — DEXAMETHASONE 2 MG/1
2 TABLET ORAL EVERY 12 HOURS
Status: DISCONTINUED | OUTPATIENT
Start: 2018-01-01 | End: 2018-01-01 | Stop reason: HOSPADM

## 2018-01-01 RX ORDER — LORAZEPAM 0.5 MG/1
0.5 TABLET ORAL EVERY 8 HOURS PRN
Status: DISCONTINUED | OUTPATIENT
Start: 2018-01-01 | End: 2018-01-01 | Stop reason: HOSPADM

## 2018-01-01 RX ORDER — PROPOFOL 10 MG/ML
VIAL (ML) INTRAVENOUS AS NEEDED
Status: DISCONTINUED | OUTPATIENT
Start: 2018-01-01 | End: 2018-01-01 | Stop reason: SURG

## 2018-01-01 RX ORDER — DEXTROSE MONOHYDRATE 25 G/50ML
25 INJECTION, SOLUTION INTRAVENOUS
Status: DISCONTINUED | OUTPATIENT
Start: 2018-01-01 | End: 2018-01-01 | Stop reason: SDUPTHER

## 2018-01-01 RX ORDER — LEVETIRACETAM 500 MG/1
500 TABLET ORAL 2 TIMES DAILY
Qty: 60 TABLET | Refills: 0 | Status: SHIPPED | OUTPATIENT
Start: 2018-01-01 | End: 2018-01-01

## 2018-01-01 RX ORDER — DEXAMETHASONE 4 MG/1
4 TABLET ORAL EVERY 8 HOURS SCHEDULED
Status: DISCONTINUED | OUTPATIENT
Start: 2018-01-01 | End: 2018-01-01

## 2018-01-01 RX ORDER — ACETAMINOPHEN 325 MG/1
650 TABLET ORAL EVERY 4 HOURS PRN
Status: DISCONTINUED | OUTPATIENT
Start: 2018-01-01 | End: 2018-01-01 | Stop reason: HOSPADM

## 2018-01-01 RX ORDER — PANTOPRAZOLE SODIUM 40 MG/1
40 TABLET, DELAYED RELEASE ORAL
Status: DISCONTINUED | OUTPATIENT
Start: 2018-01-01 | End: 2018-01-01 | Stop reason: HOSPADM

## 2018-01-01 RX ORDER — NALOXONE HCL 0.4 MG/ML
0.04 VIAL (ML) INJECTION AS NEEDED
Status: DISCONTINUED | OUTPATIENT
Start: 2018-01-01 | End: 2018-01-01 | Stop reason: HOSPADM

## 2018-01-01 RX ORDER — PRAVASTATIN SODIUM 20 MG
40 TABLET ORAL NIGHTLY
Status: DISCONTINUED | OUTPATIENT
Start: 2018-01-01 | End: 2018-01-01 | Stop reason: HOSPADM

## 2018-01-01 RX ORDER — LEVETIRACETAM 500 MG/1
500 TABLET ORAL EVERY 12 HOURS SCHEDULED
Status: DISCONTINUED | OUTPATIENT
Start: 2018-01-01 | End: 2018-01-01 | Stop reason: HOSPADM

## 2018-01-01 RX ORDER — MORPHINE SULFATE 2 MG/ML
2 INJECTION, SOLUTION INTRAMUSCULAR; INTRAVENOUS
Status: DISCONTINUED | OUTPATIENT
Start: 2018-01-01 | End: 2018-01-01 | Stop reason: HOSPADM

## 2018-01-01 RX ORDER — LEVETIRACETAM 500 MG/1
500 TABLET ORAL EVERY 12 HOURS SCHEDULED
Status: DISCONTINUED | OUTPATIENT
Start: 2018-01-01 | End: 2018-01-01 | Stop reason: SDUPTHER

## 2018-01-01 RX ORDER — ONDANSETRON 2 MG/ML
INJECTION INTRAMUSCULAR; INTRAVENOUS AS NEEDED
Status: DISCONTINUED | OUTPATIENT
Start: 2018-01-01 | End: 2018-01-01 | Stop reason: SURG

## 2018-01-01 RX ORDER — SODIUM CHLORIDE, SODIUM LACTATE, POTASSIUM CHLORIDE, CALCIUM CHLORIDE 600; 310; 30; 20 MG/100ML; MG/100ML; MG/100ML; MG/100ML
100 INJECTION, SOLUTION INTRAVENOUS CONTINUOUS
Status: DISCONTINUED | OUTPATIENT
Start: 2018-01-01 | End: 2018-01-01

## 2018-01-01 RX ORDER — LEVETIRACETAM 500 MG/1
500 TABLET ORAL 2 TIMES DAILY
Qty: 60 TABLET | Refills: 3 | Status: ON HOLD | OUTPATIENT
Start: 2018-01-01 | End: 2018-01-01 | Stop reason: HOSPADM

## 2018-01-01 RX ORDER — DOCUSATE SODIUM 100 MG/1
100 CAPSULE, LIQUID FILLED ORAL 2 TIMES DAILY PRN
Status: DISCONTINUED | OUTPATIENT
Start: 2018-01-01 | End: 2018-01-01 | Stop reason: HOSPADM

## 2018-01-01 RX ORDER — LEVETIRACETAM 500 MG/1
500 TABLET ORAL 2 TIMES DAILY
Status: DISCONTINUED | OUTPATIENT
Start: 2018-01-01 | End: 2018-01-01 | Stop reason: HOSPADM

## 2018-01-01 RX ORDER — LABETALOL HYDROCHLORIDE 5 MG/ML
5 INJECTION, SOLUTION INTRAVENOUS
Status: DISCONTINUED | OUTPATIENT
Start: 2018-01-01 | End: 2018-01-01 | Stop reason: HOSPADM

## 2018-01-01 RX ORDER — DEXTROSE MONOHYDRATE 25 G/50ML
25 INJECTION, SOLUTION INTRAVENOUS
Status: DISCONTINUED | OUTPATIENT
Start: 2018-01-01 | End: 2018-01-01 | Stop reason: HOSPADM

## 2018-01-01 RX ORDER — MORPHINE SULFATE 2 MG/ML
1 INJECTION, SOLUTION INTRAMUSCULAR; INTRAVENOUS EVERY 4 HOURS PRN
Status: DISCONTINUED | OUTPATIENT
Start: 2018-01-01 | End: 2018-01-01

## 2018-01-01 RX ORDER — LABETALOL HYDROCHLORIDE 5 MG/ML
10 INJECTION, SOLUTION INTRAVENOUS
Status: DISCONTINUED | OUTPATIENT
Start: 2018-01-01 | End: 2018-01-01 | Stop reason: HOSPADM

## 2018-01-01 RX ORDER — MECLIZINE HYDROCHLORIDE 25 MG/1
25 TABLET ORAL 3 TIMES DAILY PRN
COMMUNITY
End: 2018-01-01 | Stop reason: HOSPADM

## 2018-01-01 RX ORDER — HYDROCODONE BITARTRATE AND ACETAMINOPHEN 5; 325 MG/1; MG/1
1 TABLET ORAL EVERY 8 HOURS PRN
Status: DISCONTINUED | OUTPATIENT
Start: 2018-01-01 | End: 2018-01-01 | Stop reason: HOSPADM

## 2018-01-01 RX ORDER — IPRATROPIUM BROMIDE AND ALBUTEROL SULFATE 2.5; .5 MG/3ML; MG/3ML
3 SOLUTION RESPIRATORY (INHALATION) ONCE AS NEEDED
Status: DISCONTINUED | OUTPATIENT
Start: 2018-01-01 | End: 2018-01-01 | Stop reason: HOSPADM

## 2018-01-01 RX ORDER — DEXAMETHASONE 1 MG
1 TABLET ORAL EVERY 12 HOURS
Status: DISCONTINUED | OUTPATIENT
Start: 2018-01-01 | End: 2018-01-01

## 2018-01-01 RX ORDER — NALOXONE HCL 0.4 MG/ML
0.4 VIAL (ML) INJECTION
Status: DISCONTINUED | OUTPATIENT
Start: 2018-01-01 | End: 2018-01-01

## 2018-01-01 RX ORDER — ATORVASTATIN CALCIUM 10 MG/1
10 TABLET, FILM COATED ORAL NIGHTLY
Status: DISCONTINUED | OUTPATIENT
Start: 2018-01-01 | End: 2018-01-01 | Stop reason: HOSPADM

## 2018-01-01 RX ORDER — ONDANSETRON 2 MG/ML
4 INJECTION INTRAMUSCULAR; INTRAVENOUS AS NEEDED
Status: DISCONTINUED | OUTPATIENT
Start: 2018-01-01 | End: 2018-01-01 | Stop reason: HOSPADM

## 2018-01-01 RX ORDER — PANTOPRAZOLE SODIUM 40 MG/1
40 TABLET, DELAYED RELEASE ORAL DAILY
Status: DISCONTINUED | OUTPATIENT
Start: 2018-01-01 | End: 2018-01-01 | Stop reason: HOSPADM

## 2018-01-01 RX ORDER — DEXAMETHASONE 1 MG
1 TABLET ORAL EVERY 12 HOURS
Qty: 60 TABLET | Refills: 0 | Status: SHIPPED | OUTPATIENT
Start: 2018-01-01 | End: 2018-01-01

## 2018-01-01 RX ORDER — ONDANSETRON 2 MG/ML
4 INJECTION INTRAMUSCULAR; INTRAVENOUS EVERY 6 HOURS PRN
Status: DISCONTINUED | OUTPATIENT
Start: 2018-01-01 | End: 2018-01-01

## 2018-01-01 RX ORDER — ROCURONIUM BROMIDE 10 MG/ML
INJECTION, SOLUTION INTRAVENOUS AS NEEDED
Status: DISCONTINUED | OUTPATIENT
Start: 2018-01-01 | End: 2018-01-01 | Stop reason: SURG

## 2018-01-01 RX ORDER — METOCLOPRAMIDE HYDROCHLORIDE 5 MG/ML
5 INJECTION INTRAMUSCULAR; INTRAVENOUS
Status: DISCONTINUED | OUTPATIENT
Start: 2018-01-01 | End: 2018-01-01 | Stop reason: HOSPADM

## 2018-01-01 RX ORDER — ACETAMINOPHEN 500 MG
1000 TABLET ORAL ONCE
Status: COMPLETED | OUTPATIENT
Start: 2018-01-01 | End: 2018-01-01

## 2018-01-01 RX ADMIN — INSULIN LISPRO 14 UNITS: 100 INJECTION, SOLUTION INTRAVENOUS; SUBCUTANEOUS at 17:22

## 2018-01-01 RX ADMIN — INSULIN LISPRO 8 UNITS: 100 INJECTION, SOLUTION INTRAVENOUS; SUBCUTANEOUS at 08:22

## 2018-01-01 RX ADMIN — INSULIN LISPRO 8 UNITS: 100 INJECTION, SOLUTION INTRAVENOUS; SUBCUTANEOUS at 13:12

## 2018-01-01 RX ADMIN — SERTRALINE 50 MG: 50 TABLET, FILM COATED ORAL at 08:18

## 2018-01-01 RX ADMIN — INSULIN LISPRO 8 UNITS: 100 INJECTION, SOLUTION INTRAVENOUS; SUBCUTANEOUS at 07:56

## 2018-01-01 RX ADMIN — SODIUM CHLORIDE, POTASSIUM CHLORIDE, SODIUM LACTATE AND CALCIUM CHLORIDE 30 ML/HR: 600; 310; 30; 20 INJECTION, SOLUTION INTRAVENOUS at 13:27

## 2018-01-01 RX ADMIN — DEXAMETHASONE 2 MG: 2 TABLET ORAL at 21:19

## 2018-01-01 RX ADMIN — MICONAZOLE NITRATE: 20 POWDER TOPICAL at 08:43

## 2018-01-01 RX ADMIN — INSULIN LISPRO 8 UNITS: 100 INJECTION, SOLUTION INTRAVENOUS; SUBCUTANEOUS at 07:50

## 2018-01-01 RX ADMIN — GADOBENATE DIMEGLUMINE 20 ML: 529 INJECTION, SOLUTION INTRAVENOUS at 10:53

## 2018-01-01 RX ADMIN — INSULIN LISPRO 8 UNITS: 100 INJECTION, SOLUTION INTRAVENOUS; SUBCUTANEOUS at 12:08

## 2018-01-01 RX ADMIN — LIDOCAINE HYDROCHLORIDE 0.5 ML: 10 INJECTION, SOLUTION EPIDURAL; INFILTRATION; INTRACAUDAL; PERINEURAL at 13:28

## 2018-01-01 RX ADMIN — METFORMIN HYDROCHLORIDE 500 MG: 500 TABLET ORAL at 17:22

## 2018-01-01 RX ADMIN — SERTRALINE 50 MG: 50 TABLET, FILM COATED ORAL at 08:06

## 2018-01-01 RX ADMIN — LEVETIRACETAM 500 MG: 500 TABLET ORAL at 19:50

## 2018-01-01 RX ADMIN — METFORMIN HYDROCHLORIDE 500 MG: 500 TABLET ORAL at 17:12

## 2018-01-01 RX ADMIN — INSULIN LISPRO 8 UNITS: 100 INJECTION, SOLUTION INTRAVENOUS; SUBCUTANEOUS at 06:16

## 2018-01-01 RX ADMIN — INSULIN LISPRO 5 UNITS: 100 INJECTION, SOLUTION INTRAVENOUS; SUBCUTANEOUS at 17:27

## 2018-01-01 RX ADMIN — INSULIN LISPRO 12 UNITS: 100 INJECTION, SOLUTION INTRAVENOUS; SUBCUTANEOUS at 12:06

## 2018-01-01 RX ADMIN — INSULIN LISPRO 7 UNITS: 100 INJECTION, SOLUTION INTRAVENOUS; SUBCUTANEOUS at 12:06

## 2018-01-01 RX ADMIN — INSULIN LISPRO 8 UNITS: 100 INJECTION, SOLUTION INTRAVENOUS; SUBCUTANEOUS at 09:41

## 2018-01-01 RX ADMIN — LEVETIRACETAM 500 MG: 500 TABLET ORAL at 20:38

## 2018-01-01 RX ADMIN — SERTRALINE HYDROCHLORIDE 50 MG: 50 TABLET ORAL at 08:02

## 2018-01-01 RX ADMIN — Medication 20 UNITS: at 20:55

## 2018-01-01 RX ADMIN — LEVETIRACETAM 500 MG: 500 TABLET ORAL at 20:56

## 2018-01-01 RX ADMIN — DEXAMETHASONE 4 MG: 4 TABLET ORAL at 20:42

## 2018-01-01 RX ADMIN — LEVETIRACETAM 500 MG: 500 TABLET, FILM COATED ORAL at 05:29

## 2018-01-01 RX ADMIN — LEVETIRACETAM 500 MG: 500 TABLET ORAL at 08:33

## 2018-01-01 RX ADMIN — DEXAMETHASONE SODIUM PHOSPHATE 4 MG: 4 INJECTION, SOLUTION INTRA-ARTICULAR; INTRALESIONAL; INTRAMUSCULAR; INTRAVENOUS; SOFT TISSUE at 22:08

## 2018-01-01 RX ADMIN — TRAZODONE HYDROCHLORIDE 50 MG: 50 TABLET ORAL at 20:38

## 2018-01-01 RX ADMIN — INSULIN LISPRO 2 UNITS: 100 INJECTION, SOLUTION INTRAVENOUS; SUBCUTANEOUS at 17:22

## 2018-01-01 RX ADMIN — INSULIN LISPRO 4 UNITS: 100 INJECTION, SOLUTION INTRAVENOUS; SUBCUTANEOUS at 08:55

## 2018-01-01 RX ADMIN — HYDROCODONE BITARTRATE AND ACETAMINOPHEN 1 TABLET: 5; 325 TABLET ORAL at 03:31

## 2018-01-01 RX ADMIN — INSULIN LISPRO 8 UNITS: 100 INJECTION, SOLUTION INTRAVENOUS; SUBCUTANEOUS at 08:43

## 2018-01-01 RX ADMIN — HYDROCODONE BITARTRATE AND ACETAMINOPHEN 1 TABLET: 5; 325 TABLET ORAL at 20:42

## 2018-01-01 RX ADMIN — ROCURONIUM BROMIDE 40 MG: 10 INJECTION INTRAVENOUS at 14:13

## 2018-01-01 RX ADMIN — INSULIN LISPRO 12 UNITS: 100 INJECTION, SOLUTION INTRAVENOUS; SUBCUTANEOUS at 11:59

## 2018-01-01 RX ADMIN — CEFAZOLIN 2 G: 330 INJECTION, POWDER, FOR SOLUTION INTRAMUSCULAR; INTRAVENOUS at 13:45

## 2018-01-01 RX ADMIN — TRAZODONE HYDROCHLORIDE 50 MG: 50 TABLET ORAL at 21:54

## 2018-01-01 RX ADMIN — INSULIN LISPRO 8 UNITS: 100 INJECTION, SOLUTION INTRAVENOUS; SUBCUTANEOUS at 08:18

## 2018-01-01 RX ADMIN — METFORMIN HYDROCHLORIDE 500 MG: 500 TABLET ORAL at 09:00

## 2018-01-01 RX ADMIN — PANTOPRAZOLE SODIUM 40 MG: 40 TABLET, DELAYED RELEASE ORAL at 05:29

## 2018-01-01 RX ADMIN — INSULIN LISPRO 4 UNITS: 100 INJECTION, SOLUTION INTRAVENOUS; SUBCUTANEOUS at 17:16

## 2018-01-01 RX ADMIN — IOHEXOL 50 ML: 300 INJECTION, SOLUTION INTRAVENOUS at 13:40

## 2018-01-01 RX ADMIN — INSULIN LISPRO 12 UNITS: 100 INJECTION, SOLUTION INTRAVENOUS; SUBCUTANEOUS at 17:36

## 2018-01-01 RX ADMIN — SERTRALINE HYDROCHLORIDE 50 MG: 50 TABLET ORAL at 08:54

## 2018-01-01 RX ADMIN — DEXAMETHASONE 4 MG: 4 TABLET ORAL at 09:33

## 2018-01-01 RX ADMIN — METFORMIN HYDROCHLORIDE 500 MG: 500 TABLET ORAL at 16:58

## 2018-01-01 RX ADMIN — LORAZEPAM 0.5 MG: 0.5 TABLET ORAL at 20:38

## 2018-01-01 RX ADMIN — INSULIN LISPRO 1 UNITS: 100 INJECTION, SOLUTION INTRAVENOUS; SUBCUTANEOUS at 08:14

## 2018-01-01 RX ADMIN — DEXAMETHASONE 2 MG: 2 TABLET ORAL at 21:54

## 2018-01-01 RX ADMIN — MANNITOL: 20 INJECTION, SOLUTION INTRAVENOUS at 13:55

## 2018-01-01 RX ADMIN — HYDROCODONE BITARTRATE AND ACETAMINOPHEN 1 TABLET: 5; 325 TABLET ORAL at 14:14

## 2018-01-01 RX ADMIN — LEVETIRACETAM 500 MG: 500 TABLET, FILM COATED ORAL at 06:46

## 2018-01-01 RX ADMIN — Medication 20 UNITS: at 20:28

## 2018-01-01 RX ADMIN — SERTRALINE HYDROCHLORIDE 50 MG: 50 TABLET ORAL at 07:51

## 2018-01-01 RX ADMIN — MICONAZOLE NITRATE: 20 POWDER TOPICAL at 21:00

## 2018-01-01 RX ADMIN — SERTRALINE HYDROCHLORIDE 50 MG: 50 TABLET ORAL at 09:39

## 2018-01-01 RX ADMIN — LORAZEPAM 1 MG: 2 INJECTION INTRAMUSCULAR; INTRAVENOUS at 14:56

## 2018-01-01 RX ADMIN — TRAZODONE HYDROCHLORIDE 50 MG: 50 TABLET ORAL at 21:34

## 2018-01-01 RX ADMIN — DEXAMETHASONE 4 MG: 4 TABLET ORAL at 22:52

## 2018-01-01 RX ADMIN — LORAZEPAM 1 MG: 1 TABLET ORAL at 08:18

## 2018-01-01 RX ADMIN — INSULIN LISPRO 6 UNITS: 100 INJECTION, SOLUTION INTRAVENOUS; SUBCUTANEOUS at 22:31

## 2018-01-01 RX ADMIN — INSULIN LISPRO 3 UNITS: 100 INJECTION, SOLUTION INTRAVENOUS; SUBCUTANEOUS at 07:52

## 2018-01-01 RX ADMIN — INSULIN LISPRO 2 UNITS: 100 INJECTION, SOLUTION INTRAVENOUS; SUBCUTANEOUS at 08:23

## 2018-01-01 RX ADMIN — PANTOPRAZOLE SODIUM 40 MG: 40 TABLET, DELAYED RELEASE ORAL at 09:21

## 2018-01-01 RX ADMIN — METFORMIN HYDROCHLORIDE 500 MG: 500 TABLET ORAL at 08:33

## 2018-01-01 RX ADMIN — INSULIN LISPRO 2 UNITS: 100 INJECTION, SOLUTION INTRAVENOUS; SUBCUTANEOUS at 17:19

## 2018-01-01 RX ADMIN — PANTOPRAZOLE SODIUM 40 MG: 40 TABLET, DELAYED RELEASE ORAL at 07:51

## 2018-01-01 RX ADMIN — Medication 20 UNITS: at 21:19

## 2018-01-01 RX ADMIN — TRAZODONE HYDROCHLORIDE 50 MG: 50 TABLET ORAL at 21:19

## 2018-01-01 RX ADMIN — INSULIN LISPRO 5 UNITS: 100 INJECTION, SOLUTION INTRAVENOUS; SUBCUTANEOUS at 08:36

## 2018-01-01 RX ADMIN — SERTRALINE HYDROCHLORIDE 50 MG: 50 TABLET ORAL at 07:41

## 2018-01-01 RX ADMIN — DEXAMETHASONE 2 MG: 2 TABLET ORAL at 07:41

## 2018-01-01 RX ADMIN — ACETAMINOPHEN 1000 MG: 500 TABLET, FILM COATED ORAL at 13:23

## 2018-01-01 RX ADMIN — INSULIN LISPRO 4 UNITS: 100 INJECTION, SOLUTION INTRAVENOUS; SUBCUTANEOUS at 17:13

## 2018-01-01 RX ADMIN — DEXAMETHASONE 4 MG: 4 TABLET ORAL at 06:28

## 2018-01-01 RX ADMIN — TRAZODONE HYDROCHLORIDE 50 MG: 50 TABLET ORAL at 20:56

## 2018-01-01 RX ADMIN — INSULIN LISPRO 3 UNITS: 100 INJECTION, SOLUTION INTRAVENOUS; SUBCUTANEOUS at 12:56

## 2018-01-01 RX ADMIN — MAGNESIUM HYDROXIDE 30 ML: 400 SUSPENSION ORAL at 16:00

## 2018-01-01 RX ADMIN — LEVETIRACETAM 500 MG: 500 TABLET, FILM COATED ORAL at 18:17

## 2018-01-01 RX ADMIN — PANTOPRAZOLE SODIUM 40 MG: 40 TABLET, DELAYED RELEASE ORAL at 09:39

## 2018-01-01 RX ADMIN — INSULIN LISPRO 2 UNITS: 100 INJECTION, SOLUTION INTRAVENOUS; SUBCUTANEOUS at 08:42

## 2018-01-01 RX ADMIN — INSULIN LISPRO 8 UNITS: 100 INJECTION, SOLUTION INTRAVENOUS; SUBCUTANEOUS at 17:53

## 2018-01-01 RX ADMIN — INSULIN LISPRO 2 UNITS: 100 INJECTION, SOLUTION INTRAVENOUS; SUBCUTANEOUS at 09:42

## 2018-01-01 RX ADMIN — INSULIN LISPRO 20 UNITS: 100 INJECTION, SOLUTION INTRAVENOUS; SUBCUTANEOUS at 20:41

## 2018-01-01 RX ADMIN — TRAZODONE HYDROCHLORIDE 50 MG: 50 TABLET ORAL at 22:03

## 2018-01-01 RX ADMIN — METFORMIN HYDROCHLORIDE 500 MG: 500 TABLET ORAL at 08:27

## 2018-01-01 RX ADMIN — PANTOPRAZOLE SODIUM 40 MG: 40 TABLET, DELAYED RELEASE ORAL at 08:27

## 2018-01-01 RX ADMIN — SERTRALINE 50 MG: 50 TABLET, FILM COATED ORAL at 08:04

## 2018-01-01 RX ADMIN — DEXAMETHASONE 1 MG: 1 TABLET ORAL at 08:20

## 2018-01-01 RX ADMIN — HYDROCODONE BITARTRATE AND ACETAMINOPHEN 1 TABLET: 5; 325 TABLET ORAL at 12:33

## 2018-01-01 RX ADMIN — METFORMIN HYDROCHLORIDE 500 MG: 500 TABLET ORAL at 07:41

## 2018-01-01 RX ADMIN — PANTOPRAZOLE SODIUM 40 MG: 40 TABLET, DELAYED RELEASE ORAL at 07:52

## 2018-01-01 RX ADMIN — DEXAMETHASONE 2 MG: 2 TABLET ORAL at 20:38

## 2018-01-01 RX ADMIN — Medication 20 UNITS: at 21:54

## 2018-01-01 RX ADMIN — LEVETIRACETAM 500 MG: 500 TABLET ORAL at 07:41

## 2018-01-01 RX ADMIN — INSULIN LISPRO 8 UNITS: 100 INJECTION, SOLUTION INTRAVENOUS; SUBCUTANEOUS at 12:21

## 2018-01-01 RX ADMIN — INSULIN LISPRO 16 UNITS: 100 INJECTION, SOLUTION INTRAVENOUS; SUBCUTANEOUS at 12:30

## 2018-01-01 RX ADMIN — DEXAMETHASONE SODIUM PHOSPHATE 4 MG: 4 INJECTION, SOLUTION INTRA-ARTICULAR; INTRALESIONAL; INTRAMUSCULAR; INTRAVENOUS; SOFT TISSUE at 06:46

## 2018-01-01 RX ADMIN — LEVETIRACETAM 500 MG: 500 TABLET ORAL at 20:40

## 2018-01-01 RX ADMIN — INSULIN LISPRO 8 UNITS: 100 INJECTION, SOLUTION INTRAVENOUS; SUBCUTANEOUS at 13:14

## 2018-01-01 RX ADMIN — METFORMIN HYDROCHLORIDE 500 MG: 500 TABLET ORAL at 17:17

## 2018-01-01 RX ADMIN — INSULIN LISPRO 8 UNITS: 100 INJECTION, SOLUTION INTRAVENOUS; SUBCUTANEOUS at 08:32

## 2018-01-01 RX ADMIN — METFORMIN HYDROCHLORIDE 500 MG: 500 TABLET ORAL at 17:35

## 2018-01-01 RX ADMIN — LEVETIRACETAM 500 MG: 500 TABLET ORAL at 08:05

## 2018-01-01 RX ADMIN — DEXAMETHASONE SODIUM PHOSPHATE 4 MG: 4 INJECTION, SOLUTION INTRA-ARTICULAR; INTRALESIONAL; INTRAMUSCULAR; INTRAVENOUS; SOFT TISSUE at 00:06

## 2018-01-01 RX ADMIN — INSULIN DETEMIR 20 UNITS: 100 INJECTION, SOLUTION SUBCUTANEOUS at 21:14

## 2018-01-01 RX ADMIN — Medication 20 UNITS: at 20:39

## 2018-01-01 RX ADMIN — INSULIN LISPRO 4 UNITS: 100 INJECTION, SOLUTION INTRAVENOUS; SUBCUTANEOUS at 13:00

## 2018-01-01 RX ADMIN — INSULIN DETEMIR 20 UNITS: 100 INJECTION, SOLUTION SUBCUTANEOUS at 21:44

## 2018-01-01 RX ADMIN — INSULIN LISPRO 2 UNITS: 100 INJECTION, SOLUTION INTRAVENOUS; SUBCUTANEOUS at 21:23

## 2018-01-01 RX ADMIN — FAMOTIDINE 40 MG: 20 TABLET, FILM COATED ORAL at 08:06

## 2018-01-01 RX ADMIN — DEXAMETHASONE 1 MG: 1 TABLET ORAL at 20:44

## 2018-01-01 RX ADMIN — LEVETIRACETAM 500 MG: 500 TABLET ORAL at 08:26

## 2018-01-01 RX ADMIN — INSULIN LISPRO 5 UNITS: 100 INJECTION, SOLUTION INTRAVENOUS; SUBCUTANEOUS at 17:22

## 2018-01-01 RX ADMIN — LEVETIRACETAM 500 MG: 500 TABLET, FILM COATED ORAL at 05:44

## 2018-01-01 RX ADMIN — INSULIN LISPRO 2 UNITS: 100 INJECTION, SOLUTION INTRAVENOUS; SUBCUTANEOUS at 12:15

## 2018-01-01 RX ADMIN — PRAVASTATIN SODIUM 40 MG: 20 TABLET ORAL at 19:50

## 2018-01-01 RX ADMIN — DEXAMETHASONE SODIUM PHOSPHATE 4 MG: 4 INJECTION, SOLUTION INTRA-ARTICULAR; INTRALESIONAL; INTRAMUSCULAR; INTRAVENOUS; SOFT TISSUE at 21:15

## 2018-01-01 RX ADMIN — PANTOPRAZOLE SODIUM 40 MG: 40 TABLET, DELAYED RELEASE ORAL at 08:54

## 2018-01-01 RX ADMIN — INSULIN LISPRO 1 UNITS: 100 INJECTION, SOLUTION INTRAVENOUS; SUBCUTANEOUS at 18:03

## 2018-01-01 RX ADMIN — PRAVASTATIN SODIUM 40 MG: 20 TABLET ORAL at 20:38

## 2018-01-01 RX ADMIN — DEXAMETHASONE 2 MG: 2 TABLET ORAL at 07:51

## 2018-01-01 RX ADMIN — LEVETIRACETAM 500 MG: 500 TABLET ORAL at 08:02

## 2018-01-01 RX ADMIN — INSULIN LISPRO 8 UNITS: 100 INJECTION, SOLUTION INTRAVENOUS; SUBCUTANEOUS at 08:06

## 2018-01-01 RX ADMIN — SUFENTANIL CITRATE 10 MCG: 50 INJECTION, SOLUTION EPIDURAL; INTRAVENOUS at 14:22

## 2018-01-01 RX ADMIN — LEVETIRACETAM 500 MG: 500 TABLET, FILM COATED ORAL at 17:12

## 2018-01-01 RX ADMIN — METFORMIN HYDROCHLORIDE 500 MG: 500 TABLET ORAL at 17:53

## 2018-01-01 RX ADMIN — INSULIN HUMAN 5 UNITS: 100 INJECTION, SOLUTION PARENTERAL at 13:22

## 2018-01-01 RX ADMIN — PANTOPRAZOLE SODIUM 40 MG: 40 TABLET, DELAYED RELEASE ORAL at 08:18

## 2018-01-01 RX ADMIN — TRAZODONE HYDROCHLORIDE 50 MG: 50 TABLET ORAL at 20:40

## 2018-01-01 RX ADMIN — DEXAMETHASONE 2 MG: 2 TABLET ORAL at 08:26

## 2018-01-01 RX ADMIN — METFORMIN HYDROCHLORIDE 500 MG: 500 TABLET ORAL at 17:23

## 2018-01-01 RX ADMIN — PROPOFOL 50 MG: 10 INJECTION, EMULSION INTRAVENOUS at 13:45

## 2018-01-01 RX ADMIN — SERTRALINE HYDROCHLORIDE 50 MG: 50 TABLET ORAL at 09:21

## 2018-01-01 RX ADMIN — HYDROCODONE BITARTRATE AND ACETAMINOPHEN 1 TABLET: 5; 325 TABLET ORAL at 14:31

## 2018-01-01 RX ADMIN — SODIUM CHLORIDE 75 ML/HR: 9 INJECTION, SOLUTION INTRAVENOUS at 18:27

## 2018-01-01 RX ADMIN — METFORMIN HYDROCHLORIDE 500 MG: 500 TABLET ORAL at 09:33

## 2018-01-01 RX ADMIN — MICONAZOLE NITRATE: 20 POWDER TOPICAL at 21:19

## 2018-01-01 RX ADMIN — INSULIN LISPRO 7 UNITS: 100 INJECTION, SOLUTION INTRAVENOUS; SUBCUTANEOUS at 21:34

## 2018-01-01 RX ADMIN — INSULIN LISPRO 8 UNITS: 100 INJECTION, SOLUTION INTRAVENOUS; SUBCUTANEOUS at 12:17

## 2018-01-01 RX ADMIN — DEXAMETHASONE SODIUM PHOSPHATE 4 MG: 4 INJECTION, SOLUTION INTRA-ARTICULAR; INTRALESIONAL; INTRAMUSCULAR; INTRAVENOUS; SOFT TISSUE at 05:47

## 2018-01-01 RX ADMIN — INSULIN LISPRO 8 UNITS: 100 INJECTION, SOLUTION INTRAVENOUS; SUBCUTANEOUS at 17:37

## 2018-01-01 RX ADMIN — INSULIN LISPRO 4 UNITS: 100 INJECTION, SOLUTION INTRAVENOUS; SUBCUTANEOUS at 07:42

## 2018-01-01 RX ADMIN — Medication 20 UNITS: at 21:08

## 2018-01-01 RX ADMIN — INSULIN LISPRO 8 UNITS: 100 INJECTION, SOLUTION INTRAVENOUS; SUBCUTANEOUS at 08:37

## 2018-01-01 RX ADMIN — HYDROCODONE BITARTRATE AND ACETAMINOPHEN 1 TABLET: 5; 325 TABLET ORAL at 21:26

## 2018-01-01 RX ADMIN — LIDOCAINE HYDROCHLORIDE 1 EACH: 40 SOLUTION TOPICAL at 13:39

## 2018-01-01 RX ADMIN — INSULIN DETEMIR 20 UNITS: 100 INJECTION, SOLUTION SUBCUTANEOUS at 20:41

## 2018-01-01 RX ADMIN — INSULIN LISPRO 12 UNITS: 100 INJECTION, SOLUTION INTRAVENOUS; SUBCUTANEOUS at 21:29

## 2018-01-01 RX ADMIN — INSULIN LISPRO 8 UNITS: 100 INJECTION, SOLUTION INTRAVENOUS; SUBCUTANEOUS at 12:59

## 2018-01-01 RX ADMIN — INSULIN LISPRO 5 UNITS: 100 INJECTION, SOLUTION INTRAVENOUS; SUBCUTANEOUS at 17:17

## 2018-01-01 RX ADMIN — PRAVASTATIN SODIUM 40 MG: 20 TABLET ORAL at 21:05

## 2018-01-01 RX ADMIN — LEVETIRACETAM 500 MG: 500 TABLET ORAL at 08:20

## 2018-01-01 RX ADMIN — DEXAMETHASONE SODIUM PHOSPHATE 4 MG: 4 INJECTION, SOLUTION INTRA-ARTICULAR; INTRALESIONAL; INTRAMUSCULAR; INTRAVENOUS; SOFT TISSUE at 21:34

## 2018-01-01 RX ADMIN — LEVETIRACETAM 500 MG: 500 TABLET ORAL at 07:52

## 2018-01-01 RX ADMIN — LEVETIRACETAM 500 MG: 500 TABLET, FILM COATED ORAL at 17:17

## 2018-01-01 RX ADMIN — METFORMIN HYDROCHLORIDE 500 MG: 500 TABLET ORAL at 08:04

## 2018-01-01 RX ADMIN — INSULIN LISPRO 8 UNITS: 100 INJECTION, SOLUTION INTRAVENOUS; SUBCUTANEOUS at 20:07

## 2018-01-01 RX ADMIN — DEXAMETHASONE 2 MG: 2 TABLET ORAL at 09:21

## 2018-01-01 RX ADMIN — LEVETIRACETAM 500 MG: 500 TABLET ORAL at 20:54

## 2018-01-01 RX ADMIN — SODIUM CHLORIDE, POTASSIUM CHLORIDE, SODIUM LACTATE AND CALCIUM CHLORIDE 30 ML/HR: 600; 310; 30; 20 INJECTION, SOLUTION INTRAVENOUS at 16:43

## 2018-01-01 RX ADMIN — LEVETIRACETAM 500 MG: 500 TABLET ORAL at 09:21

## 2018-01-01 RX ADMIN — INSULIN LISPRO 8 UNITS: 100 INJECTION, SOLUTION INTRAVENOUS; SUBCUTANEOUS at 13:51

## 2018-01-01 RX ADMIN — TRAZODONE HYDROCHLORIDE 50 MG: 50 TABLET ORAL at 21:05

## 2018-01-01 RX ADMIN — FENTANYL CITRATE 50 MCG: 50 INJECTION, SOLUTION INTRAMUSCULAR; INTRAVENOUS at 16:43

## 2018-01-01 RX ADMIN — PANTOPRAZOLE SODIUM 40 MG: 40 TABLET, DELAYED RELEASE ORAL at 08:33

## 2018-01-01 RX ADMIN — DEXAMETHASONE SODIUM PHOSPHATE 4 MG: 4 INJECTION, SOLUTION INTRA-ARTICULAR; INTRALESIONAL; INTRAMUSCULAR; INTRAVENOUS; SOFT TISSUE at 05:28

## 2018-01-01 RX ADMIN — SERTRALINE HYDROCHLORIDE 50 MG: 50 TABLET ORAL at 08:20

## 2018-01-01 RX ADMIN — ROCURONIUM BROMIDE 10 MG: 10 INJECTION INTRAVENOUS at 13:38

## 2018-01-01 RX ADMIN — INSULIN LISPRO 8 UNITS: 100 INJECTION, SOLUTION INTRAVENOUS; SUBCUTANEOUS at 12:06

## 2018-01-01 RX ADMIN — INSULIN LISPRO 8 UNITS: 100 INJECTION, SOLUTION INTRAVENOUS; SUBCUTANEOUS at 17:22

## 2018-01-01 RX ADMIN — DEXAMETHASONE 2 MG: 2 TABLET ORAL at 19:50

## 2018-01-01 RX ADMIN — HYDROCODONE BITARTRATE AND ACETAMINOPHEN 1 TABLET: 5; 325 TABLET ORAL at 23:18

## 2018-01-01 RX ADMIN — DEXAMETHASONE 1 MG: 1 TABLET ORAL at 20:56

## 2018-01-01 RX ADMIN — INSULIN LISPRO 8 UNITS: 100 INJECTION, SOLUTION INTRAVENOUS; SUBCUTANEOUS at 18:02

## 2018-01-01 RX ADMIN — ATORVASTATIN CALCIUM 10 MG: 10 TABLET, FILM COATED ORAL at 20:59

## 2018-01-01 RX ADMIN — SERTRALINE 50 MG: 50 TABLET, FILM COATED ORAL at 08:37

## 2018-01-01 RX ADMIN — MICONAZOLE NITRATE: 20 POWDER TOPICAL at 20:45

## 2018-01-01 RX ADMIN — INSULIN LISPRO 5 UNITS: 100 INJECTION, SOLUTION INTRAVENOUS; SUBCUTANEOUS at 08:02

## 2018-01-01 RX ADMIN — LABETALOL HYDROCHLORIDE 10 MG: 5 INJECTION, SOLUTION INTRAVENOUS at 16:15

## 2018-01-01 RX ADMIN — SUFENTANIL CITRATE 30 MCG: 50 INJECTION, SOLUTION EPIDURAL; INTRAVENOUS at 13:36

## 2018-01-01 RX ADMIN — SODIUM CHLORIDE 5 MG/HR: 9 INJECTION, SOLUTION INTRAVENOUS at 17:37

## 2018-01-01 RX ADMIN — PRAVASTATIN SODIUM 40 MG: 20 TABLET ORAL at 21:19

## 2018-01-01 RX ADMIN — INSULIN LISPRO 5 UNITS: 100 INJECTION, SOLUTION INTRAVENOUS; SUBCUTANEOUS at 17:12

## 2018-01-01 RX ADMIN — INSULIN LISPRO 1 UNITS: 100 INJECTION, SOLUTION INTRAVENOUS; SUBCUTANEOUS at 21:30

## 2018-01-01 RX ADMIN — DEXAMETHASONE 1 MG: 1 TABLET ORAL at 20:54

## 2018-01-01 RX ADMIN — INSULIN LISPRO 8 UNITS: 100 INJECTION, SOLUTION INTRAVENOUS; SUBCUTANEOUS at 07:44

## 2018-01-01 RX ADMIN — LEVETIRACETAM 500 MG: 500 TABLET, FILM COATED ORAL at 05:47

## 2018-01-01 RX ADMIN — DEXAMETHASONE SODIUM PHOSPHATE 4 MG: 4 INJECTION, SOLUTION INTRA-ARTICULAR; INTRALESIONAL; INTRAMUSCULAR; INTRAVENOUS; SOFT TISSUE at 17:37

## 2018-01-01 RX ADMIN — Medication 20 UNITS: at 20:38

## 2018-01-01 RX ADMIN — INSULIN LISPRO 6 UNITS: 100 INJECTION, SOLUTION INTRAVENOUS; SUBCUTANEOUS at 18:20

## 2018-01-01 RX ADMIN — HYDROCODONE BITARTRATE AND ACETAMINOPHEN 1 TABLET: 5; 325 TABLET ORAL at 19:24

## 2018-01-01 RX ADMIN — SUCCINYLCHOLINE CHLORIDE 100 MG: 20 INJECTION, SOLUTION INTRAMUSCULAR; INTRAVENOUS at 13:38

## 2018-01-01 RX ADMIN — METFORMIN HYDROCHLORIDE 500 MG: 500 TABLET ORAL at 17:59

## 2018-01-01 RX ADMIN — DEXAMETHASONE 1 MG: 1 TABLET ORAL at 08:42

## 2018-01-01 RX ADMIN — INSULIN LISPRO 1 UNITS: 100 INJECTION, SOLUTION INTRAVENOUS; SUBCUTANEOUS at 12:28

## 2018-01-01 RX ADMIN — TRAZODONE HYDROCHLORIDE 50 MG: 50 TABLET ORAL at 21:44

## 2018-01-01 RX ADMIN — NICARDIPINE HYDROCHLORIDE 250 MCG: 25 INJECTION INTRAVENOUS at 15:46

## 2018-01-01 RX ADMIN — INSULIN LISPRO 14 UNITS: 100 INJECTION, SOLUTION INTRAVENOUS; SUBCUTANEOUS at 21:04

## 2018-01-01 RX ADMIN — INSULIN LISPRO 8 UNITS: 100 INJECTION, SOLUTION INTRAVENOUS; SUBCUTANEOUS at 16:52

## 2018-01-01 RX ADMIN — INSULIN LISPRO 3 UNITS: 100 INJECTION, SOLUTION INTRAVENOUS; SUBCUTANEOUS at 21:08

## 2018-01-01 RX ADMIN — INSULIN LISPRO 14 UNITS: 100 INJECTION, SOLUTION INTRAVENOUS; SUBCUTANEOUS at 21:43

## 2018-01-01 RX ADMIN — INSULIN LISPRO 5 UNITS: 100 INJECTION, SOLUTION INTRAVENOUS; SUBCUTANEOUS at 12:04

## 2018-01-01 RX ADMIN — SERTRALINE HYDROCHLORIDE 50 MG: 50 TABLET ORAL at 08:33

## 2018-01-01 RX ADMIN — INSULIN LISPRO 3 UNITS: 100 INJECTION, SOLUTION INTRAVENOUS; SUBCUTANEOUS at 17:19

## 2018-01-01 RX ADMIN — DEXAMETHASONE SODIUM PHOSPHATE 4 MG: 4 INJECTION, SOLUTION INTRA-ARTICULAR; INTRALESIONAL; INTRAMUSCULAR; INTRAVENOUS; SOFT TISSUE at 21:44

## 2018-01-01 RX ADMIN — METFORMIN HYDROCHLORIDE 500 MG: 500 TABLET ORAL at 07:51

## 2018-01-01 RX ADMIN — INSULIN LISPRO 1 UNITS: 100 INJECTION, SOLUTION INTRAVENOUS; SUBCUTANEOUS at 08:20

## 2018-01-01 RX ADMIN — INSULIN LISPRO 4 UNITS: 100 INJECTION, SOLUTION INTRAVENOUS; SUBCUTANEOUS at 17:12

## 2018-01-01 RX ADMIN — LEVETIRACETAM 500 MG: 500 TABLET, FILM COATED ORAL at 06:28

## 2018-01-01 RX ADMIN — METFORMIN HYDROCHLORIDE 500 MG: 500 TABLET ORAL at 08:20

## 2018-01-01 RX ADMIN — INSULIN LISPRO 5 UNITS: 100 INJECTION, SOLUTION INTRAVENOUS; SUBCUTANEOUS at 12:30

## 2018-01-01 RX ADMIN — IOPAMIDOL 150 ML: 612 INJECTION, SOLUTION INTRAVENOUS at 15:17

## 2018-01-01 RX ADMIN — PRAVASTATIN SODIUM 40 MG: 20 TABLET ORAL at 20:54

## 2018-01-01 RX ADMIN — TRAZODONE HYDROCHLORIDE 50 MG: 50 TABLET ORAL at 19:51

## 2018-01-01 RX ADMIN — INSULIN DETEMIR 20 UNITS: 100 INJECTION, SOLUTION SUBCUTANEOUS at 20:07

## 2018-01-01 RX ADMIN — LEVETIRACETAM 500 MG: 500 TABLET, FILM COATED ORAL at 17:27

## 2018-01-01 RX ADMIN — INSULIN LISPRO 8 UNITS: 100 INJECTION, SOLUTION INTRAVENOUS; SUBCUTANEOUS at 00:18

## 2018-01-01 RX ADMIN — NICARDIPINE HYDROCHLORIDE 500 MCG: 25 INJECTION INTRAVENOUS at 16:03

## 2018-01-01 RX ADMIN — INSULIN LISPRO 1 UNITS: 100 INJECTION, SOLUTION INTRAVENOUS; SUBCUTANEOUS at 16:57

## 2018-01-01 RX ADMIN — INSULIN LISPRO 8 UNITS: 100 INJECTION, SOLUTION INTRAVENOUS; SUBCUTANEOUS at 08:10

## 2018-01-01 RX ADMIN — SODIUM CHLORIDE 75 ML/HR: 9 INJECTION, SOLUTION INTRAVENOUS at 17:24

## 2018-01-01 RX ADMIN — PANTOPRAZOLE SODIUM 40 MG: 40 TABLET, DELAYED RELEASE ORAL at 06:26

## 2018-01-01 RX ADMIN — PRAVASTATIN SODIUM 40 MG: 20 TABLET ORAL at 20:39

## 2018-01-01 RX ADMIN — DEXAMETHASONE SODIUM PHOSPHATE 4 MG: 4 INJECTION, SOLUTION INTRA-ARTICULAR; INTRALESIONAL; INTRAMUSCULAR; INTRAVENOUS; SOFT TISSUE at 06:12

## 2018-01-01 RX ADMIN — ATORVASTATIN CALCIUM 10 MG: 10 TABLET, FILM COATED ORAL at 00:06

## 2018-01-01 RX ADMIN — CEFAZOLIN 2 G: 330 INJECTION, POWDER, FOR SOLUTION INTRAMUSCULAR; INTRAVENOUS at 23:16

## 2018-01-01 RX ADMIN — METFORMIN HYDROCHLORIDE 500 MG: 500 TABLET ORAL at 21:14

## 2018-01-01 RX ADMIN — INSULIN LISPRO 12 UNITS: 100 INJECTION, SOLUTION INTRAVENOUS; SUBCUTANEOUS at 17:27

## 2018-01-01 RX ADMIN — INSULIN LISPRO 8 UNITS: 100 INJECTION, SOLUTION INTRAVENOUS; SUBCUTANEOUS at 08:16

## 2018-01-01 RX ADMIN — INSULIN LISPRO 8 UNITS: 100 INJECTION, SOLUTION INTRAVENOUS; SUBCUTANEOUS at 08:21

## 2018-01-01 RX ADMIN — SODIUM CHLORIDE 75 ML/HR: 9 INJECTION, SOLUTION INTRAVENOUS at 08:11

## 2018-01-01 RX ADMIN — INSULIN LISPRO 8 UNITS: 100 INJECTION, SOLUTION INTRAVENOUS; SUBCUTANEOUS at 12:19

## 2018-01-01 RX ADMIN — METFORMIN HYDROCHLORIDE 500 MG: 500 TABLET ORAL at 09:21

## 2018-01-01 RX ADMIN — SERTRALINE HYDROCHLORIDE 50 MG: 50 TABLET ORAL at 08:34

## 2018-01-01 RX ADMIN — LEVETIRACETAM 500 MG: 500 TABLET ORAL at 21:54

## 2018-01-01 RX ADMIN — INSULIN LISPRO 8 UNITS: 100 INJECTION, SOLUTION INTRAVENOUS; SUBCUTANEOUS at 08:25

## 2018-01-01 RX ADMIN — LEVETIRACETAM 500 MG: 500 TABLET ORAL at 21:19

## 2018-01-01 RX ADMIN — INSULIN LISPRO 8 UNITS: 100 INJECTION, SOLUTION INTRAVENOUS; SUBCUTANEOUS at 08:59

## 2018-01-01 RX ADMIN — SERTRALINE 50 MG: 50 TABLET, FILM COATED ORAL at 09:00

## 2018-01-01 RX ADMIN — FAMOTIDINE 20 MG: 10 INJECTION INTRAVENOUS at 13:23

## 2018-01-01 RX ADMIN — INSULIN LISPRO 1 UNITS: 100 INJECTION, SOLUTION INTRAVENOUS; SUBCUTANEOUS at 21:33

## 2018-01-01 RX ADMIN — DEXAMETHASONE 4 MG: 4 TABLET ORAL at 21:24

## 2018-01-01 RX ADMIN — DEXAMETHASONE SODIUM PHOSPHATE 4 MG: 4 INJECTION, SOLUTION INTRA-ARTICULAR; INTRALESIONAL; INTRAMUSCULAR; INTRAVENOUS; SOFT TISSUE at 14:23

## 2018-01-01 RX ADMIN — LEVETIRACETAM 500 MG: 500 TABLET, FILM COATED ORAL at 17:36

## 2018-01-01 RX ADMIN — MICONAZOLE NITRATE: 20 POWDER TOPICAL at 07:46

## 2018-01-01 RX ADMIN — PRAVASTATIN SODIUM 40 MG: 20 TABLET ORAL at 20:26

## 2018-01-01 RX ADMIN — INSULIN LISPRO 8 UNITS: 100 INJECTION, SOLUTION INTRAVENOUS; SUBCUTANEOUS at 18:36

## 2018-01-01 RX ADMIN — INSULIN LISPRO 8 UNITS: 100 INJECTION, SOLUTION INTRAVENOUS; SUBCUTANEOUS at 18:35

## 2018-01-01 RX ADMIN — DEXAMETHASONE 2 MG: 2 TABLET ORAL at 20:26

## 2018-01-01 RX ADMIN — SODIUM CHLORIDE 75 ML/HR: 9 INJECTION, SOLUTION INTRAVENOUS at 06:24

## 2018-01-01 RX ADMIN — CEFAZOLIN 2 G: 330 INJECTION, POWDER, FOR SOLUTION INTRAMUSCULAR; INTRAVENOUS at 06:12

## 2018-01-01 RX ADMIN — PANTOPRAZOLE SODIUM 40 MG: 40 TABLET, DELAYED RELEASE ORAL at 08:01

## 2018-01-01 RX ADMIN — INSULIN LISPRO 12 UNITS: 100 INJECTION, SOLUTION INTRAVENOUS; SUBCUTANEOUS at 09:33

## 2018-01-01 RX ADMIN — INSULIN LISPRO 8 UNITS: 100 INJECTION, SOLUTION INTRAVENOUS; SUBCUTANEOUS at 17:00

## 2018-01-01 RX ADMIN — MORPHINE SULFATE 1 MG: 2 INJECTION, SOLUTION INTRAMUSCULAR; INTRAVENOUS at 00:17

## 2018-01-01 RX ADMIN — Medication 20 UNITS: at 21:06

## 2018-01-01 RX ADMIN — INSULIN LISPRO 8 UNITS: 100 INJECTION, SOLUTION INTRAVENOUS; SUBCUTANEOUS at 12:57

## 2018-01-01 RX ADMIN — DEXAMETHASONE 4 MG: 4 TABLET ORAL at 09:00

## 2018-01-01 RX ADMIN — INSULIN LISPRO 1 UNITS: 100 INJECTION, SOLUTION INTRAVENOUS; SUBCUTANEOUS at 12:20

## 2018-01-01 RX ADMIN — HYDROCODONE BITARTRATE AND ACETAMINOPHEN 1 TABLET: 5; 325 TABLET ORAL at 03:30

## 2018-01-01 RX ADMIN — LIDOCAINE HYDROCHLORIDE 60 MG: 20 INJECTION, SOLUTION INFILTRATION; PERINEURAL at 13:38

## 2018-01-01 RX ADMIN — MICONAZOLE NITRATE: 20 POWDER TOPICAL at 22:25

## 2018-01-01 RX ADMIN — EPHEDRINE SULFATE 10 MG: 50 INJECTION INTRAMUSCULAR; INTRAVENOUS; SUBCUTANEOUS at 13:42

## 2018-01-01 RX ADMIN — EPHEDRINE SULFATE 5 MG: 50 INJECTION INTRAMUSCULAR; INTRAVENOUS; SUBCUTANEOUS at 14:51

## 2018-01-01 RX ADMIN — DEXAMETHASONE SODIUM PHOSPHATE 4 MG: 4 INJECTION, SOLUTION INTRA-ARTICULAR; INTRALESIONAL; INTRAMUSCULAR; INTRAVENOUS; SOFT TISSUE at 05:45

## 2018-01-01 RX ADMIN — INSULIN LISPRO 14 UNITS: 100 INJECTION, SOLUTION INTRAVENOUS; SUBCUTANEOUS at 18:17

## 2018-01-01 RX ADMIN — ONDANSETRON HYDROCHLORIDE 4 MG: 2 SOLUTION INTRAMUSCULAR; INTRAVENOUS at 15:40

## 2018-01-01 RX ADMIN — ATORVASTATIN CALCIUM 10 MG: 10 TABLET, FILM COATED ORAL at 21:24

## 2018-01-01 RX ADMIN — ATORVASTATIN CALCIUM 10 MG: 10 TABLET, FILM COATED ORAL at 21:44

## 2018-01-01 RX ADMIN — GADOBENATE DIMEGLUMINE 10 ML: 529 INJECTION, SOLUTION INTRAVENOUS at 10:45

## 2018-01-01 RX ADMIN — DEXAMETHASONE 1 MG: 1 TABLET ORAL at 07:52

## 2018-01-01 RX ADMIN — LEVETIRACETAM 500 MG: 500 TABLET ORAL at 21:05

## 2018-01-01 RX ADMIN — MICONAZOLE NITRATE: 20 POWDER TOPICAL at 21:03

## 2018-01-01 RX ADMIN — LORAZEPAM 0.5 MG: 0.5 TABLET ORAL at 23:43

## 2018-01-01 RX ADMIN — INSULIN LISPRO 8 UNITS: 100 INJECTION, SOLUTION INTRAVENOUS; SUBCUTANEOUS at 17:21

## 2018-01-01 RX ADMIN — LEVETIRACETAM 500 MG: 500 TABLET, FILM COATED ORAL at 05:24

## 2018-01-01 RX ADMIN — INSULIN LISPRO 2 UNITS: 100 INJECTION, SOLUTION INTRAVENOUS; SUBCUTANEOUS at 12:16

## 2018-01-01 RX ADMIN — DEXAMETHASONE 1 MG: 1 TABLET ORAL at 21:00

## 2018-01-01 RX ADMIN — LEVETIRACETAM 500 MG: 5 INJECTION INTRAVENOUS at 16:46

## 2018-01-01 RX ADMIN — ATORVASTATIN CALCIUM 10 MG: 10 TABLET, FILM COATED ORAL at 20:42

## 2018-01-01 RX ADMIN — Medication 20 UNITS: at 20:57

## 2018-01-01 RX ADMIN — SERTRALINE 50 MG: 50 TABLET, FILM COATED ORAL at 09:33

## 2018-01-01 RX ADMIN — INSULIN LISPRO 8 UNITS: 100 INJECTION, SOLUTION INTRAVENOUS; SUBCUTANEOUS at 09:26

## 2018-01-01 RX ADMIN — HYDROCODONE BITARTRATE AND ACETAMINOPHEN 1 TABLET: 5; 325 TABLET ORAL at 10:14

## 2018-01-01 RX ADMIN — INSULIN LISPRO 12 UNITS: 100 INJECTION, SOLUTION INTRAVENOUS; SUBCUTANEOUS at 12:24

## 2018-01-01 RX ADMIN — Medication 2 MG: at 15:46

## 2018-01-01 RX ADMIN — DEXAMETHASONE 2 MG: 2 TABLET ORAL at 09:49

## 2018-01-01 RX ADMIN — PRAVASTATIN SODIUM 40 MG: 20 TABLET ORAL at 20:56

## 2018-01-01 RX ADMIN — FAMOTIDINE 40 MG: 20 TABLET, FILM COATED ORAL at 08:04

## 2018-01-01 RX ADMIN — INSULIN LISPRO 12 UNITS: 100 INJECTION, SOLUTION INTRAVENOUS; SUBCUTANEOUS at 13:51

## 2018-01-01 RX ADMIN — INSULIN DETEMIR 20 UNITS: 100 INJECTION, SOLUTION SUBCUTANEOUS at 21:04

## 2018-01-01 RX ADMIN — INSULIN LISPRO 8 UNITS: 100 INJECTION, SOLUTION INTRAVENOUS; SUBCUTANEOUS at 17:24

## 2018-01-01 RX ADMIN — DEXAMETHASONE 2 MG: 2 TABLET ORAL at 20:37

## 2018-01-01 RX ADMIN — INSULIN LISPRO 8 UNITS: 100 INJECTION, SOLUTION INTRAVENOUS; SUBCUTANEOUS at 12:25

## 2018-01-01 RX ADMIN — INSULIN LISPRO 8 UNITS: 100 INJECTION, SOLUTION INTRAVENOUS; SUBCUTANEOUS at 12:14

## 2018-01-01 RX ADMIN — DOCUSATE SODIUM -SENNOSIDES 2 TABLET: 50; 8.6 TABLET, COATED ORAL at 10:35

## 2018-01-01 RX ADMIN — INSULIN DETEMIR 20 UNITS: 100 INJECTION, SOLUTION SUBCUTANEOUS at 21:30

## 2018-01-01 RX ADMIN — LEVETIRACETAM 500 MG: 5 INJECTION INTRAVENOUS at 05:16

## 2018-01-01 RX ADMIN — PROPOFOL 100 MG: 10 INJECTION, EMULSION INTRAVENOUS at 13:38

## 2018-01-01 RX ADMIN — DEXAMETHASONE 1 MG: 1 TABLET ORAL at 08:14

## 2018-01-01 RX ADMIN — INSULIN DETEMIR 20 UNITS: 100 INJECTION, SOLUTION SUBCUTANEOUS at 21:33

## 2018-01-01 RX ADMIN — PANTOPRAZOLE SODIUM 40 MG: 40 TABLET, DELAYED RELEASE ORAL at 08:20

## 2018-01-01 RX ADMIN — PANTOPRAZOLE SODIUM 40 MG: 40 TABLET, DELAYED RELEASE ORAL at 07:41

## 2018-01-01 RX ADMIN — PANTOPRAZOLE SODIUM 40 MG: 40 TABLET, DELAYED RELEASE ORAL at 06:28

## 2018-01-01 RX ADMIN — TRAZODONE HYDROCHLORIDE 50 MG: 50 TABLET ORAL at 20:54

## 2018-01-01 RX ADMIN — INSULIN LISPRO 3 UNITS: 100 INJECTION, SOLUTION INTRAVENOUS; SUBCUTANEOUS at 12:17

## 2018-01-01 RX ADMIN — INSULIN LISPRO 3 UNITS: 100 INJECTION, SOLUTION INTRAVENOUS; SUBCUTANEOUS at 09:23

## 2018-01-01 RX ADMIN — METFORMIN HYDROCHLORIDE 500 MG: 500 TABLET ORAL at 09:39

## 2018-01-01 RX ADMIN — GADOBENATE DIMEGLUMINE 10 ML: 529 INJECTION, SOLUTION INTRAVENOUS at 15:00

## 2018-01-01 RX ADMIN — LEVETIRACETAM 500 MG: 500 TABLET ORAL at 20:26

## 2018-01-01 RX ADMIN — PANTOPRAZOLE SODIUM 40 MG: 40 TABLET, DELAYED RELEASE ORAL at 08:34

## 2018-01-01 RX ADMIN — LEVETIRACETAM 500 MG: 500 TABLET ORAL at 08:34

## 2018-01-01 RX ADMIN — DEXAMETHASONE SODIUM PHOSPHATE 4 MG: 4 INJECTION, SOLUTION INTRA-ARTICULAR; INTRALESIONAL; INTRAMUSCULAR; INTRAVENOUS; SOFT TISSUE at 14:38

## 2018-01-01 RX ADMIN — SERTRALINE HYDROCHLORIDE 50 MG: 50 TABLET ORAL at 08:27

## 2018-01-01 RX ADMIN — INSULIN LISPRO 5 UNITS: 100 INJECTION, SOLUTION INTRAVENOUS; SUBCUTANEOUS at 12:24

## 2018-01-01 RX ADMIN — ATORVASTATIN CALCIUM 10 MG: 10 TABLET, FILM COATED ORAL at 21:15

## 2018-01-01 RX ADMIN — DEXAMETHASONE 2 MG: 2 TABLET ORAL at 08:54

## 2018-01-01 RX ADMIN — METFORMIN HYDROCHLORIDE 500 MG: 500 TABLET ORAL at 08:01

## 2018-01-01 RX ADMIN — INSULIN LISPRO 8 UNITS: 100 INJECTION, SOLUTION INTRAVENOUS; SUBCUTANEOUS at 12:18

## 2018-01-01 RX ADMIN — INSULIN LISPRO 8 UNITS: 100 INJECTION, SOLUTION INTRAVENOUS; SUBCUTANEOUS at 11:52

## 2018-01-01 RX ADMIN — METFORMIN HYDROCHLORIDE 1000 MG: 500 TABLET ORAL at 17:36

## 2018-01-01 RX ADMIN — Medication 20 UNITS: at 21:15

## 2018-01-01 RX ADMIN — METFORMIN HYDROCHLORIDE 500 MG: 500 TABLET ORAL at 17:16

## 2018-01-01 RX ADMIN — METFORMIN HYDROCHLORIDE 500 MG: 500 TABLET ORAL at 08:54

## 2018-01-01 RX ADMIN — LEVETIRACETAM 500 MG: 500 TABLET, FILM COATED ORAL at 18:27

## 2018-01-01 RX ADMIN — DEXAMETHASONE 4 MG: 4 TABLET ORAL at 14:14

## 2018-01-01 RX ADMIN — TRAZODONE HYDROCHLORIDE 50 MG: 50 TABLET ORAL at 22:06

## 2018-01-01 RX ADMIN — LEVETIRACETAM 500 MG: 500 TABLET ORAL at 09:39

## 2018-01-01 RX ADMIN — INSULIN LISPRO 1 UNITS: 100 INJECTION, SOLUTION INTRAVENOUS; SUBCUTANEOUS at 17:35

## 2018-01-01 RX ADMIN — INSULIN LISPRO 4 UNITS: 100 INJECTION, SOLUTION INTRAVENOUS; SUBCUTANEOUS at 08:04

## 2018-01-01 RX ADMIN — INSULIN LISPRO 4 UNITS: 100 INJECTION, SOLUTION INTRAVENOUS; SUBCUTANEOUS at 11:50

## 2018-01-01 RX ADMIN — METFORMIN HYDROCHLORIDE 500 MG: 500 TABLET ORAL at 16:51

## 2018-01-01 RX ADMIN — SERTRALINE 50 MG: 50 TABLET, FILM COATED ORAL at 18:27

## 2018-01-01 RX ADMIN — NICARDIPINE HYDROCHLORIDE 500 MCG: 25 INJECTION INTRAVENOUS at 15:52

## 2018-01-01 RX ADMIN — TRAZODONE HYDROCHLORIDE 50 MG: 50 TABLET ORAL at 20:27

## 2018-01-01 RX ADMIN — GLYCOPYRROLATE 0.2 MG: 0.2 INJECTION, SOLUTION INTRAMUSCULAR; INTRAVENOUS at 15:46

## 2018-01-01 RX ADMIN — LEVETIRACETAM 500 MG: 500 TABLET, FILM COATED ORAL at 17:22

## 2018-01-01 RX ADMIN — INSULIN LISPRO 3 UNITS: 100 INJECTION, SOLUTION INTRAVENOUS; SUBCUTANEOUS at 12:14

## 2018-01-01 RX ADMIN — INSULIN LISPRO 8 UNITS: 100 INJECTION, SOLUTION INTRAVENOUS; SUBCUTANEOUS at 17:36

## 2018-01-01 RX ADMIN — INSULIN LISPRO 8 UNITS: 100 INJECTION, SOLUTION INTRAVENOUS; SUBCUTANEOUS at 17:42

## 2018-01-01 RX ADMIN — INSULIN LISPRO 8 UNITS: 100 INJECTION, SOLUTION INTRAVENOUS; SUBCUTANEOUS at 17:16

## 2018-01-01 RX ADMIN — PRAVASTATIN SODIUM 40 MG: 20 TABLET ORAL at 21:54

## 2018-01-01 RX ADMIN — LORAZEPAM 0.5 MG: 0.5 TABLET ORAL at 20:27

## 2018-01-01 RX ADMIN — LEVETIRACETAM 500 MG: 500 TABLET ORAL at 08:54

## 2018-01-01 RX ADMIN — LEVETIRACETAM 500 MG: 500 TABLET, FILM COATED ORAL at 06:26

## 2018-01-01 RX ADMIN — INSULIN LISPRO 8 UNITS: 100 INJECTION, SOLUTION INTRAVENOUS; SUBCUTANEOUS at 21:34

## 2018-01-01 RX ADMIN — SODIUM CHLORIDE 75 ML/HR: 9 INJECTION, SOLUTION INTRAVENOUS at 03:16

## 2018-01-01 RX ADMIN — SODIUM CHLORIDE, POTASSIUM CHLORIDE, SODIUM LACTATE AND CALCIUM CHLORIDE 100 ML/HR: 600; 310; 30; 20 INJECTION, SOLUTION INTRAVENOUS at 13:28

## 2018-01-01 RX ADMIN — DEXAMETHASONE 2 MG: 2 TABLET ORAL at 09:39

## 2018-01-01 RX ADMIN — ATORVASTATIN CALCIUM 10 MG: 10 TABLET, FILM COATED ORAL at 20:07

## 2018-01-01 RX ADMIN — DEXAMETHASONE SODIUM PHOSPHATE 4 MG: 4 INJECTION, SOLUTION INTRA-ARTICULAR; INTRALESIONAL; INTRAMUSCULAR; INTRAVENOUS; SOFT TISSUE at 13:51

## 2018-01-01 RX ADMIN — ATORVASTATIN CALCIUM 10 MG: 10 TABLET, FILM COATED ORAL at 21:34

## 2018-01-01 RX ADMIN — INSULIN LISPRO 1 UNITS: 100 INJECTION, SOLUTION INTRAVENOUS; SUBCUTANEOUS at 18:32

## 2018-01-01 ASSESSMENT — ENCOUNTER SYMPTOMS
ABDOMINAL PAIN: 0
BLOOD IN STOOL: 0
BACK PAIN: 0
BLOOD IN STOOL: 0
ABDOMINAL PAIN: 0
BACK PAIN: 1
BACK PAIN: 1
EYE DISCHARGE: 0
NAUSEA: 0
ABDOMINAL PAIN: 0
SHORTNESS OF BREATH: 0
TROUBLE SWALLOWING: 0
WHEEZING: 0
SHORTNESS OF BREATH: 0
SHORTNESS OF BREATH: 0
SORE THROAT: 0
TROUBLE SWALLOWING: 0
DIARRHEA: 0
COUGH: 0
VOMITING: 0
EYE ITCHING: 0
ABDOMINAL DISTENTION: 0
COUGH: 0
VOICE CHANGE: 0
NAUSEA: 0
SINUS PRESSURE: 0
NAUSEA: 0

## 2018-01-01 ASSESSMENT — PATIENT HEALTH QUESTIONNAIRE - PHQ9
SUM OF ALL RESPONSES TO PHQ QUESTIONS 1-9: 0
1. LITTLE INTEREST OR PLEASURE IN DOING THINGS: 0
SUM OF ALL RESPONSES TO PHQ9 QUESTIONS 1 & 2: 0
2. FEELING DOWN, DEPRESSED OR HOPELESS: 0

## 2018-01-01 ASSESSMENT — PAIN SCALES - GENERAL
PAINLEVEL_OUTOF10: 6
PAINLEVEL_OUTOF10: 3
PAINLEVEL_OUTOF10: 0

## 2018-06-13 PROBLEM — H81.10 BENIGN POSITIONAL VERTIGO, UNSPECIFIED LATERALITY: Status: RESOLVED | Noted: 2017-01-01 | Resolved: 2018-01-01
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
## 2018-08-31 PROBLEM — R26.9 GAIT DISTURBANCE: Status: ACTIVE | Noted: 2018-01-01

## 2018-08-31 PROBLEM — F09 COGNITIVE DYSFUNCTION: Status: ACTIVE | Noted: 2018-01-01

## 2018-08-31 PROBLEM — F41.9 ANXIETY: Status: ACTIVE | Noted: 2018-01-01

## 2018-08-31 PROBLEM — G47.01 INSOMNIA DUE TO MEDICAL CONDITION: Status: ACTIVE | Noted: 2018-01-01

## 2018-08-31 PROBLEM — M54.50 LUMBAR PAIN: Status: ACTIVE | Noted: 2018-01-01

## 2018-08-31 PROBLEM — R42 VERTIGO: Status: ACTIVE | Noted: 2018-01-01

## 2018-08-31 NOTE — PROGRESS NOTES
Family History   Problem Relation Age of Onset    Diabetes Mother       Social History     Social History    Marital status:      Spouse name: Joshua Crawford Number of children: 0    Years of education: 21     Occupational History    retired educator      Social History Main Topics    Smoking status: Never Smoker    Smokeless tobacco: Never Used    Alcohol use No    Drug use: No    Sexual activity: Yes     Partners: Female      Comment: wife     Other Topics Concern    Not on file     Social History Narrative    No narrative on file      No Known Allergies   Current Outpatient Prescriptions   Medication Sig Dispense Refill    sertraline (ZOLOFT) 50 MG tablet Take 1 tablet by mouth daily 30 tablet 5    LORazepam (ATIVAN) 1 MG tablet Take 0.5 tablets by mouth every 8 hours as needed for Anxiety for up to 30 days. . 30 tablet 0    traZODone (DESYREL) 50 MG tablet Take 1 tablet by mouth nightly For sleep 30 tablet 1    metFORMIN (GLUCOPHAGE) 500 MG tablet TAKE 1 TABLET IN THE MORNING & 2 TABLETS IN THE EVENING. 270 tablet 0    ONE TOUCH ULTRA TEST strip TEST BLOOD SUGAR ONCE DAILY *E11.9* 50 strip 0    meclizine (ANTIVERT) 25 MG tablet Take 25 mg by mouth 3 times daily as needed      pravastatin (PRAVACHOL) 40 MG tablet Take 1 tablet by mouth nightly 90 tablet 3     No current facility-administered medications for this visit. Review of Systems   Constitutional: Negative for chills, diaphoresis, fever and unexpected weight change. HENT: Negative for congestion, trouble swallowing and voice change. Eyes: Negative for visual disturbance. Respiratory: Negative for cough and shortness of breath. Cardiovascular: Negative for chest pain, palpitations and leg swelling. Gastrointestinal: Negative for abdominal pain, blood in stool, diarrhea and nausea. Genitourinary: Negative for dysuria. Musculoskeletal: Positive for arthralgias, back pain and gait problem (per HPI).  Negative for myalgias. Neurological: Negative for seizures, syncope, speech difficulty and numbness. Psychiatric/Behavioral:        Per HPI        /70   Pulse 94   Ht 5' 11\" (1.803 m)   Wt 179 lb 9.6 oz (81.5 kg)   SpO2 95%   BMI 25.05 kg/m²    Physical Exam   Gen. : Alert in no distress But he clearly seems to have had a change in his cognitive abilities since I saw him previously. I followed him for some time and he is definitely had a change with more difficulty interacting. HEENT: Normocephalic. No abnormalities. Pupils reactive clear  Neck: No thyromegaly or adenopathy  Cardiac: Regular rate and rhythm without murmur S3 or S4. No carotid bruits  Pulmonary: Lungs clear with normal respiratory effort. Abdomen: Soft nontender with no hepatosplenomegaly. Bowel sounds normal.  Extremities: No clubbing cyanosis or edema. Neurologic: No focal weakness.  are symmetric. Cranial nerves are intact. Finger to nose testing reveals mild intention tremor bilaterally. No resting tremor noted. Romberg is negative. Gait is slow with decreased arm movement. DIAGNOSES:    ICD-10-CM ICD-9-CM    1. Cognitive dysfunction - subacute F09 294.9 MRI BRAIN WO CONTRAST      Ambulatory referral to Neurology      US Carotid Artery Bilateral      Folate      CBC      Comprehensive Metabolic Panel      Vitamin B12      TSH without Reflex      Sedimentation Rate      B. Burgdorferi Antibodies      Rickettsia Rickettsii [facility] Spotted Fever -RMSF) Antibodies IgG & IgM by IFA      Ehrlichia Antibody Panel   2. Lumbar pain M54.5 724.2 MRI Lumbar Spine WO Contrast   3. Gait disturbance R26.9 781.2 MRI Lumbar Spine WO Contrast      Ambulatory referral to Neurology      US Carotid Artery Bilateral   4. Vertigo R42 780.4 MRI BRAIN WO CONTRAST      Ambulatory referral to Neurology      US Carotid Artery Bilateral   5. Anxiety F41.9 300.00 sertraline (ZOLOFT) 50 MG tablet      LORazepam (ATIVAN) 1 MG tablet   6.  Insomnia due

## 2018-09-05 PROBLEM — D49.6 BRAIN NEOPLASM (HCC): Chronic | Status: ACTIVE | Noted: 2018-01-01

## 2018-09-05 PROBLEM — N28.89 LEFT RENAL MASS: Chronic | Status: ACTIVE | Noted: 2018-01-01

## 2018-09-05 PROBLEM — M51.9 LUMBAR DISC DISEASE: Chronic | Status: ACTIVE | Noted: 2018-01-01

## 2018-09-05 NOTE — TELEPHONE ENCOUNTER
Renal US ordered for tomorrow at 11:55am. Will give to the patient when he comes in today. Will call Dr. Rafael Coker and see how soon they can see him.

## 2018-09-05 NOTE — PROGRESS NOTES
 Sexual activity: Yes     Partners: Female      Comment: wife     Other Topics Concern    Not on file     Social History Narrative    No narrative on file      No Known Allergies   Current Outpatient Prescriptions   Medication Sig Dispense Refill    levETIRAcetam (KEPPRA) 500 MG tablet Take 1 tablet by mouth 2 times daily 60 tablet 3    LORazepam (ATIVAN) 1 MG tablet Take 0.5 tablets by mouth every 8 hours as needed for Anxiety for up to 30 days. . 30 tablet 0    metFORMIN (GLUCOPHAGE) 500 MG tablet TAKE 1 TABLET IN THE MORNING & 2 TABLETS IN THE EVENING. 270 tablet 0    ONE TOUCH ULTRA TEST strip TEST BLOOD SUGAR ONCE DAILY *E11.9* 50 strip 0    meclizine (ANTIVERT) 25 MG tablet Take 25 mg by mouth 3 times daily as needed      pravastatin (PRAVACHOL) 40 MG tablet Take 1 tablet by mouth nightly 90 tablet 3    sertraline (ZOLOFT) 50 MG tablet Take 1 tablet by mouth daily 30 tablet 5    traZODone (DESYREL) 50 MG tablet Take 1 tablet by mouth nightly For sleep 30 tablet 1     No current facility-administered medications for this visit. Review of Systems   He denies any visual field defect, double vision or headache. He has not had any further worsening of cognitive dysfunction or balance over the weekend. Not had symptoms suggesting seizures     BP (!) 188/90 (Site: Left Arm, Position: Sitting)   Pulse 80   Resp 20   Ht 5' 11\" (1.803 m)   Wt 175 lb 9.6 oz (79.7 kg)   SpO2 95%   BMI 24.49 kg/m²    Physical Exam   Gen. : Alert in no distress. HEENT: Normocephalic. Cardiac: Regular rate and rhythm without murmur S3 or S4.  No carotid bruits   Neurologic:  Gait is slow     Results for orders placed or performed in visit on 08/31/18   Folate   Result Value Ref Range    Folate 13.0 4.5 - 32.2 ng/mL   CBC   Result Value Ref Range    WBC 8.7 4.8 - 10.8 K/uL    RBC 5.00 4.70 - 6.10 M/uL    Hemoglobin 14.2 14.0 - 18.0 g/dL    Hematocrit 44.3 42.0 - 52.0 %    MCV 88.6 80.0 - 94.0 fL    MCH 28.4 27.0 - probably primary brain tumor vs metastatic lesion D49.6 239.6 External Referral To Neurosurgery   2. Left renal mass N28.89 593.9    3. Lumbar disc disease M51.9 722.93    4. Cognitive dysfunction - subacute F09 294.9    5. Gait disturbance R26.9 781.2    6. Essential hypertension I10 401.9         Orders Placed This Encounter   Procedures    External Referral To Neurosurgery          New Prescriptions    LEVETIRACETAM (KEPPRA) 500 MG TABLET    Take 1 tablet by mouth 2 times daily        ASSESSMENT/PLAN:  I spent about 30 minutes discussing the above findings with Mr. Jj Xiong, his wife and his nephew. Unfortunately I think he may likely have a primary brain tumor possibly a glioblastoma. Metastatic tumor cannot be ruled out at this point but given the size and single focus that would seem less likely. Hopefully the lesion on the kidney is just a cyst but we will need to characterize this with ultrasound. At this point the main focus needs to be on the brain tumor and getting that assessed and taken care of. I'm going to start him empirically on Keppra 500 mg twice a day. I will be working to get him in to see neurosurgery tomorrow or at least as soon as possible. His preference is to be seen at [facility]. Addendum: I was able to be in contact with Dr. Christian Irwin who has graciously agreed to see him tomorrow morning at 8:00. We have contacted the patient and have let him know and Dr. Sonia Segundo office will also be in contact with him this evening.

## 2018-09-05 NOTE — TELEPHONE ENCOUNTER
Dr. Mccray Bachelor still wants to do the MRI of the Lumbar. Patient has been added to our scheduled for today at 4 pm to discuss his results.

## 2018-09-06 PROBLEM — D49.6 NEOPLASM, BRAIN (HCC): Status: ACTIVE | Noted: 2018-01-01

## 2018-09-06 PROBLEM — Z78.9 NON-SMOKER: Status: ACTIVE | Noted: 2018-01-01

## 2018-09-06 PROBLEM — D43.2 NEOPLASM OF UNCERTAIN BEHAVIOR OF BRAIN (HCC): Status: ACTIVE | Noted: 2018-01-01

## 2018-09-06 PROBLEM — IMO0001 NORMAL BODY MASS INDEX (BMI): Status: ACTIVE | Noted: 2018-01-01

## 2018-09-06 PROBLEM — R26.81 UNSTEADY GAIT: Status: ACTIVE | Noted: 2018-01-01

## 2018-09-06 PROBLEM — R68.89 FORGETFULNESS: Status: ACTIVE | Noted: 2018-01-01

## 2018-09-06 NOTE — PROGRESS NOTES
Date of Admission: (Not on file)  Primary Care Physician: Saurav Lima MD        Subjective:    Chief complaint brain tumor    HPI: 78-year-old gentleman who gives him one month history of low back pain and dizziness.  His wife and family also give a one-month history of processing and cognitive issues.  Some irrational behavior such as bleeding his wife in certain places many as well as to take her home, leaving the water running, for getting close car doors and walking away and parking lots.  He also relates gradual trouble walking is particularly he feels his left leg is not functioning properly.  He has not fallen but is almost fallen several times.  He has trouble dressing himself now and has also trouble with his left upper extremity.  A month ago he had no gait difficulty and was a very high functioning gentleman.  He denies any headaches no nausea no vomiting.  He does relate any vision changes.    Review of Systems   Musculoskeletal: Positive for back pain and gait problem.   Psychiatric/Behavioral: Positive for behavioral problems and confusion.   All other systems reviewed and are negative.       Past Medical History:   Past Medical History:   Diagnosis Date   • Arthritis    • Diabetes mellitus (CMS/[facility])    • Fractured skull (CMS/[facility]) 01/2016    garage door fell on him       Past Surgical History: History reviewed. No pertinent surgical history.    Family History: family history is not on file.    Social History:  reports that he has never smoked. He has never used smokeless tobacco. He reports that he does not drink alcohol or use drugs.    Code Status: Full    Medications:    (Not in a hospital admission)    Allergies:  No Known Allergies    Objective:  Physical Exam   Constitutional: He is oriented to person, place, and time. He appears well-developed and well-nourished.   Cardiovascular: Normal rate and regular rhythm.    Pulmonary/Chest: Effort normal. No respiratory distress.   Abdominal: Soft. He  exhibits no distension. There is no tenderness.   Neurological: He is oriented to person, place, and time. He has an abnormal Finger-Nose-Finger Test, an abnormal Romberg Test and an abnormal Tandem Gait Test.   Reflex Scores:       Bicep reflexes are 1+ on the right side and 1+ on the left side.       Patellar reflexes are 1+ on the right side and 1+ on the left side.  Psychiatric: His speech is normal.       Neurologic Exam     Mental Status   Oriented to person, place, and time.   Attention: normal.   Speech: speech is normal   Level of consciousness: alert    Cranial Nerves   Cranial nerves II through XII intact.     Motor Exam   Muscle bulk: normal  Overall muscle tone: normal  Right arm pronator drift: absent  Left arm pronator drift: absent    Strength   Strength 5/5 except as noted. Some dysmetria and difficulty with rapid alternating movements the left hand     Sensory Exam   Light touch normal.   Pinprick normal.     Gait, Coordination, and Reflexes     Gait  Gait: (Unsteady gait)    Coordination   Romberg: positive  Finger to nose coordination: abnormal  Tandem walking coordination: abnormal    Tremor   Resting tremor: absent  Intention tremor: absent  Action tremor: absent    Reflexes   Right biceps: 1+  Left biceps: 1+  Right patellar: 1+  Left patellar: 1+  Right Goetz: absent  Left Goetz: absent  Right ankle clonus: absent  Left ankle clonus: absent      Vital Signs  BP: (128)/(82) 128/82        Results Review:  I reviewed the patient's new clinical results.  I reviewed the patient's new imaging results and agree with the interpretation.  I reviewed the patient's other test results and agree with the interpretation         Lab Results (last 24 hours)     ** No results found for the last 24 hours. **          Imaging Results (last 24 hours)     ** No results found for the last 24 hours. **                 Assessment/Plan:   MRI the brain with and without contrast shows a large right occipital  parietal ring-enhancing mass abutting the ventricles corpus callosum and falx.  MRI the lumbar spine shows a left renal cyst    The patient has a large right frontal enhancing mass.  The differential diagnosis includes primary versus secondary malignant neoplasm of the brain.  There is a left renal cyst partially visualized on MRI the lumbar spine.  We are in a directly admitted the hospital for workup of this new finding.  We will order CT scan of the chest abdomen pelvis.  We will get a bone scan and a normal renal ultrasound if necessary.  We will start him on antiepileptics.  And consider starting him on steroids.  I explained to him and his wife that he will need surgery to remove the tumor and establish a tissue diagnosis.  They acknowledged understanding of this.  Their questions concerns were addressed.  We will get him admitted and evaluated as soon as possible.      I discussed the patients findings and my recommendations with patient and family    Mika Ervin MD  09/06/18  8:28 AM    Time: More than 50% of time spent in counseling and coordination of care:  Total face-to-face/floor time 60 min.  Time spent in counseling 30 min. Counseling included the following topics: Diagnosis, condition, treatment, plan

## 2018-09-06 NOTE — H&P
Subjective:     Chief complaint brain tumor     HPI: 78-year-old gentleman who gives him one month history of low back pain and dizziness.  His wife and family also give a one-month history of processing and cognitive issues.  Some irrational behavior such as bleeding his wife in certain places many as well as to take her home, leaving the water running, for getting close car doors and walking away and parking lots.  He also relates gradual trouble walking is particularly he feels his left leg is not functioning properly.  He has not fallen but is almost fallen several times.  He has trouble dressing himself now and has also trouble with his left upper extremity.  A month ago he had no gait difficulty and was a very high functioning gentleman.  He denies any headaches no nausea no vomiting.  He does relate any vision changes.     Review of Systems   Musculoskeletal: Positive for back pain and gait problem.   Psychiatric/Behavioral: Positive for behavioral problems and confusion.   All other systems reviewed and are negative.        Past Medical History:   Medical History        Past Medical History:   Diagnosis Date   • Arthritis     • Diabetes mellitus (CMS/[facility])     • Fractured skull (CMS/[facility]) 01/2016     garage door fell on him            Past Surgical History:   Surgical History   History reviewed. No pertinent surgical history.        Family History: family history is not on file.     Social History:  reports that he has never smoked. He has never used smokeless tobacco. He reports that he does not drink alcohol or use drugs.     Code Status: Full     Medications:    Prescriptions Prior to Admission      (Not in a hospital admission)        Allergies:  No Known Allergies     Objective:  Physical Exam   Constitutional: He is oriented to person, place, and time. He appears well-developed and well-nourished.   Cardiovascular: Normal rate and regular rhythm.    Pulmonary/Chest: Effort normal. No respiratory  distress.   Abdominal: Soft. He exhibits no distension. There is no tenderness.   Neurological: He is oriented to person, place, and time. He has an abnormal Finger-Nose-Finger Test, an abnormal Romberg Test and an abnormal Tandem Gait Test.   Reflex Scores:       Bicep reflexes are 1+ on the right side and 1+ on the left side.       Patellar reflexes are 1+ on the right side and 1+ on the left side.  Psychiatric: His speech is normal.         Neurologic Exam      Mental Status   Oriented to person, place, and time.   Attention: normal.   Speech: speech is normal   Level of consciousness: alert     Cranial Nerves   Cranial nerves II through XII intact.      Motor Exam   Muscle bulk: normal  Overall muscle tone: normal  Right arm pronator drift: absent  Left arm pronator drift: absent     Strength   Strength 5/5 except as noted. Some dysmetria and difficulty with rapid alternating movements the left hand      Sensory Exam   Light touch normal.   Pinprick normal.      Gait, Coordination, and Reflexes      Gait  Gait: (Unsteady gait)     Coordination   Romberg: positive  Finger to nose coordination: abnormal  Tandem walking coordination: abnormal     Tremor   Resting tremor: absent  Intention tremor: absent  Action tremor: absent     Reflexes   Right biceps: 1+  Left biceps: 1+  Right patellar: 1+  Left patellar: 1+  Right Goetz: absent  Left Goetz: absent  Right ankle clonus: absent  Left ankle clonus: absent        Vital Signs  BP: (128)/(82) 128/82           Results Review:  I reviewed the patient's new clinical results.  I reviewed the patient's new imaging results and agree with the interpretation.  I reviewed the patient's other test results and agree with the interpretation                 Lab Results (last 24 hours)      ** No results found for the last 24 hours. **                 Imaging Results (last 24 hours)      ** No results found for the last 24 hours. **                    Assessment/Plan:   MRI  the brain with and without contrast shows a large right occipital parietal ring-enhancing mass abutting the ventricles corpus callosum and falx.  MRI the lumbar spine shows a left renal cyst     The patient has a large right frontal enhancing mass.  The differential diagnosis includes primary versus secondary malignant neoplasm of the brain.  There is a left renal cyst partially visualized on MRI the lumbar spine.  We are in a directly admitted the hospital for workup of this new finding.  We will order CT scan of the chest abdomen pelvis.  We will get a bone scan and a normal renal ultrasound if necessary.  We will start him on antiepileptics.  And consider starting him on steroids.  I explained to him and his wife that he will need surgery to remove the tumor and establish a tissue diagnosis.  They acknowledged understanding of this.  Their questions concerns were addressed.  We will get him admitted and evaluated as soon as possible.        I discussed the patients findings and my recommendations with patient and family     Mika Ervin MD  09/06/18  8:28 AM     Time: More than 50% of time spent in counseling and coordination of care:  Total face-to-face/floor time 60 min.  Time spent in counseling 30 min. Counseling included the following topics: Diagnosis, condition, treatment, plan

## 2018-09-06 NOTE — TELEPHONE ENCOUNTER
----- Message from Venita Santos sent at 9/5/2018  6:01 PM CDT -----  Pt has decided that he wants to see Dr. Walker Fox at [facility]. We need to cancel the appointment with Charmaine Omalley for tomorrow. Sorry. Thank you for working him in so quickly.

## 2018-09-07 NOTE — PLAN OF CARE
Problem: Patient Care Overview  Goal: Plan of Care Review  Outcome: Ongoing (interventions implemented as appropriate)   09/07/18 1123   Coping/Psychosocial   Plan of Care Reviewed With patient;family  (nephew)   OTHER   Outcome Summary PT IE complete. Pt demonstrated impaired sensation, impaired coordination, and impaired propriception on his L side. During ambulation, pt loses balance to the L requiring Min assist to recover. Pt having difficulty w/ dynamic sitting balance, backwards gait, and static standing balance as well. Mild weakness L hip flexion. Pt to receive skilled PT to address functional mobility safety. May benefit from acute rehab vs OP PT at [place] pending progress following surgery. Thank you for referral.

## 2018-09-07 NOTE — PLAN OF CARE
"Problem: Patient Care Overview  Goal: Plan of Care Review   09/07/18 0948   Coping/Psychosocial   Plan of Care Reviewed With patient;family   Plan of Care Review   Progress improving   OTHER   Outcome Summary OT eval complete. Pt requires supervision for safety in performing bed mobility. Pt demo's very slight weakness of LUE shoulder flexion. Pt also demo's impaired sensation, coordination, and proprioception on his L side. Pt demo's deficits w/ L digit 3-5 opposition. Pt required verbal and tactile cues to correct. While seated EOB, pt adjusted L sock when prompted with Min A for balance 2' posterior lean. Pt DOFFED R sock when instructed to \"pull up\" his R sock. Pt demo'd difficulty with maintaining dynamic sitting balance and static standing balance, requiring assistance. During simulated community mobility activity, pt demo'd occasional LOB to the L, requiring Min A to correct. Mr. Cobian would benefit from continued OT services while in the hospital to improve safety and independence in completing ADLs and functional mobility. I recommend Mr. Cobian attend inpatient acute rehab at d/c.          "

## 2018-09-07 NOTE — CONSULTS
[facility] Medicine Consult  Inpatient Hospitalist Consult  Consult performed by: RICHARD GORDON  Consult ordered by: JUANY HALL      Date of Admission: 9/6/2018  Date of Consult: 09/07/18    Primary Care Physician: Saurav Lima MD  Referring Physician: Juany Hall MD  Chief Complaint/Reason for Consultation: Diabetes Management     Subjective   History of Present Illness  The patient is a 78-year-old  male who presented to Dr. Hall's office on 9/6/18 after initially being evaluated by his primary care provider Dr. Lima on 9/5/18.  The patient has been having subacute cognitive impairment issues with gait and balance.  He is also having lower back pain as well.  MRI of the brain was ordered per PCP which showed a 6.3 x 3.9 x 4.9 cm centrally necrotic enhancing mass centered in the paramedian posterior right parietal and right occipital lobes with some mass effect on the right lateral ventricle but no midline shift.  High suspicions for a primary glioblastoma however metastatic disease is also in the differential.  He was then referred to Dr. Hall who graciously agreed to see him the next morning in his office.  Prior to seeing Dr. Hall in the office and MRI with and without contrast of the brain was performed showing again a right parietal occipital mass.  Apparently 1 month prior the patient was having no gait difficulty and a very high functioning 78-year-old gentleman.  Dr. Hall discussed with the patient and family about treatment and the decision was made to admit the patient for further workup and likely surgery.  There was also a questionable mass versus cyst on the left kidney renal ultrasound shows large cortical cyst involving the upper pole of the left kidney.  CT of the chest abdomen and pelvis were performed with no evidence of possible metastatic or primary cancer.  He is currently receiving Keppra for seizure prophylaxis as  well as Decadron for the edema, brain.    With been consulted on this patient for diabetes management.  The patient is a type II diabetic and appears to only be on metformin 500 mg in the a.m. and 1000 mg in the evening at home.  However now that he is on IV Decadron and he is having very high blood glucoses.  He will be started on Levemir 20 units nightly along with a moderate to high dose sliding scale insulin.  Metformin will be resumed tomorrow evening, currently being held due to administration of IV dye.    He is currently sitting up on the side of the bed eating supper.  Wife at the bedside.  Wife is very overwhelmed with information to diagnosis.  She also appears to be angry.  The patient denies having any issues with his gait or confusion at this time.  He seems to come across as if nothing is wrong with him currently.  Discussed with LEONEL Campbell at the bedside.  Currently glucose is greater than 500 on the glucometer pending blood glucose via lab draw.     Review of Systems   Constitutional: Positive for activity change and fatigue. Negative for appetite change, chills and fever.   HENT: Negative for hearing loss, nosebleeds, tinnitus and trouble swallowing.    Eyes: Negative for visual disturbance.   Respiratory: Negative for cough, chest tightness, shortness of breath and wheezing.    Cardiovascular: Negative for chest pain, palpitations and leg swelling.   Gastrointestinal: Negative for abdominal distention, abdominal pain, blood in stool, constipation, diarrhea, nausea and vomiting.   Endocrine: Negative for cold intolerance, heat intolerance, polydipsia, polyphagia and polyuria.   Genitourinary: Negative for decreased urine volume, difficulty urinating, dysuria, flank pain, frequency and hematuria.   Musculoskeletal: Positive for gait problem. Negative for arthralgias, joint swelling and myalgias.   Skin: Negative for rash.   Allergic/Immunologic: Negative for immunocompromised state.   Neurological:  Positive for dizziness and weakness. Negative for syncope, light-headedness and headaches.   Hematological: Negative for adenopathy. Does not bruise/bleed easily.   Psychiatric/Behavioral: Positive for agitation and confusion. Negative for sleep disturbance. The patient is not nervous/anxious.       Otherwise complete ROS is negative except as mentioned above.    Past Medical History:   Past Medical History:   Diagnosis Date   • Arthritis    • Diabetes mellitus (CMS/[facility])    • Fractured skull (CMS/[facility]) 01/2016    garage door fell on him     Past Surgical History:History reviewed. No pertinent surgical history.  Social History:  reports that he has never smoked. He has never used smokeless tobacco. He reports that he does not drink alcohol or use drugs.    Family History: No history of brain cancer in the family.     Allergies: No Known Allergies  Medications: Scheduled Meds:  atorvastatin 10 mg Oral Nightly   dexamethasone 4 mg Intravenous Q8H   famotidine 40 mg Oral Daily   insulin detemir 20 Units Subcutaneous Nightly   insulin lispro 0-14 Units Subcutaneous 4x Daily With Meals & Nightly   levETIRAcetam 500 mg Oral Q12H   [START ON 9/8/2018] metFORMIN 500 mg Oral BID With Meals   sertraline 50 mg Oral Daily     Continuous Infusions:  sodium chloride 75 mL/hr Last Rate: 75 mL/hr (09/07/18 0811)     PRN Meds:.bisacodyl  •  dextrose  •  dextrose  •  glucagon (human recombinant)  •  HYDROcodone-acetaminophen  •  ondansetron  •  sennosides-docusate sodium  •  sodium chloride  •  traZODone    Objective   Objective    Physical Exam:   Temp:  [97.4 °F (36.3 °C)-98.7 °F (37.1 °C)] 97.8 °F (36.6 °C)  Heart Rate:  [] 107  Resp:  [16-18] 16  BP: (130-150)/(53-67) 142/55  Physical Exam   Constitutional: He is oriented to person, place, and time. He appears well-developed and well-nourished.   Sitting up on the side of the bed. NAD.   HENT:   Head: Normocephalic and atraumatic.   Eyes: Pupils are equal, round, and reactive  to light. Conjunctivae and EOM are normal.   Neck: Neck supple. No JVD present. No thyromegaly present.   Cardiovascular: Normal rate, regular rhythm, normal heart sounds and intact distal pulses.  Exam reveals no gallop and no friction rub.    No murmur heard.  Pulmonary/Chest: Effort normal and breath sounds normal. No respiratory distress. He has no wheezes. He has no rales. He exhibits no tenderness.   Abdominal: Soft. Bowel sounds are normal. He exhibits no distension. There is no tenderness. There is no rebound and no guarding.   Musculoskeletal: Normal range of motion. He exhibits no edema, tenderness or deformity.   Lymphadenopathy:     He has no cervical adenopathy.   Neurological: He is alert and oriented to person, place, and time. He displays normal reflexes. No cranial nerve deficit. He exhibits normal muscle tone.   Spastic movements   Skin: Skin is warm and dry. No rash noted.   Psychiatric: His mood appears anxious. He is hyperactive. He expresses impulsivity.   Nursing note and vitals reviewed.    Results Reviewed:  I have personally reviewed current lab, radiology, and data and agree with results.  Lab Results (last 24 hours)     Procedure Component Value Units Date/Time    POC Glucose Once [[id number]]  (Abnormal) Collected:  09/07/18 1047    Specimen:  Blood Updated:  09/07/18 1059     Glucose 301 (H) mg/dL      Comment: : [id number] Reasor NathanMeter ID: [id number]       POC Glucose Once [[id number]]  (Abnormal) Collected:  09/07/18 0734    Specimen:  Blood Updated:  09/07/18 0745     Glucose 383 (H) mg/dL      Comment: : [id number] Elmaor NathanMeter ID: [id number]       POC Glucose Once [[id number]]  (Abnormal) Collected:  09/06/18 2014    Specimen:  Blood Updated:  09/06/18 2026     Glucose 346 (H) mg/dL      Comment: : [id number] Pura BunnAsteriskMeter ID: [id number]       POC Glucose Once [[id number]]  (Abnormal) Collected:  09/06/18 1830    Specimen:  Blood Updated:  09/06/18 1841      Glucose 379 (H) mg/dL      Comment: : [id number] Gloria MeganMeter ID: [id number]           Imaging Results (last 24 hours)     Procedure Component Value Units Date/Time    US Renal Bilateral [[id number]] Collected:  09/07/18 1423     Updated:  09/07/18 1427    Narrative:       RENAL ULTRASOUND COMPLETE 9/7/2018 1:16 PM CDT     REASON FOR EXAM: renal cyst; Z74.09-Other reduced mobility; Z74.09-Other  reduced mobility       COMPARISON: None       TECHNIQUE: Multiple longitudinal and transverse realtime sonographic  images of the kidneys and urinary bladder are obtained.      FINDINGS:      RIGHT KIDNEY: 11.9 cm. Normal in size, shape, contour and position. No  solid or cystic masses. The central echo complex is compact with no  evidence for hydronephrosis. No nephrolithiasis or abnormal perinephric  fluid collections . No hydroureter.      LEFT KIDNEY: 11.7 cm. Normal in size, shape, contour and position. There  is a large cyst involving the upper pole of left kidney measuring 8.9 x  5.9 x 6.9 cm in size.. The central echo complex is compact with no  evidence for hydronephrosis. No nephrolithiasis or abnormal perinephric  fluid collections . No hydroureter.      PELVIS: The bladder is mildly distended with anechoic urine and  demonstrates no significant wall thickening or internal echogenicities.  There is no surrounding ascites.        Impression:       1. Large cortical cyst involving the upper pole of the left kidney.  Otherwise normal renal ultrasound..        This report was finalized on 09/07/2018 14:24 by Dr. Domingo Richey MD.    MRI Brain With & Without Contrast [[id number]] Collected:  09/06/18 1715     Updated:  09/07/18 0858    Narrative:       EXAM: MR BRAIN WITHOUT AND WITH IV CONTRAST 9/7/2018  COMPARISON: MRI brain dated 1/19/2017.      HISTORY: 78 years-old Male. Neoplasm of unspecified behavior of brain     TECHNIQUE:   Routine pulse sequences were obtained of the brain before and after  the  administration of IV contrast.      REPORT:   Motion degraded examination.        There is a right supratentorial mass measuring 5.2 x 3.3 x 4.6 cm which  is inseparable from the corpus callosum and the posterior falx..  However, it is unclear if this mass is intra-axial or extra-axial in  location. The corpus callosum appears displaced posteriorly and  laterally. The mass shows diffusion restriction and T2 hypointensity  peripherally with a thick rind, reflecting high cellularity. There are  numerous vessels coursing through the mass. No obvious hemorrhagic  component although. There is T2 hyperintense signal at the center,  nonenhancing that does not suppress on FLAIR, consistent with a  complicated fluid. Of note, the extent of T2/FLAIR abnormal signal  associated with the mass is underwhelming for a GBM or a parenchymal  metastasis.        Mass effect is noted on the right lateral ventricle, with partial  effacement of the posterior horn, atrium and posterior body of the right  lateral ventricle. The left lateral ventricle is unchanged in size when  compared to MRI of brain dated 1/90/17. Abnormal T2/FLAIR signal in the  bilateral posterior periventricular regions were present on 1/19/2017  and therefore acute hydrocephalus is felt to be less likely. There is no  significant midline shift. The basal cisterns appear preserved at this  time. The flow-voids of the [place] appear maintained  centrally. Grossly, the dural sinuses are patent.          Impression:       1.  Motion degraded examination.  2.  Right parieto-occipital mass as described above. It is unclear if  this is intra-axial (cortical based) or extra-axial. However, the extent  of vasogenic edema is underwhelming for a GBM or an intra-axial  metastasis. Differential consideration does include dural based masses  including, atypical meningioma hemangiopericytoma and dural metastasis.  Aggressive cortical-based glial tumors remain in the  differential.  This report was finalized on 09/07/2018 08:55 by Dr. Sharlene Morales MD.        Assessment / Plan   Assessment:   1.  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  Type II diabetes, with hyperglycemia  3.  Gait disturbance  4.  Acute cognitive impairment  5.  Large cortical cyst involving the upper pole of the left kidney  6.  5 mm nodule in the left upper lobe    Plan:   1.  Start Levemir 20 units nightly  2.  Sliding scale insulin moderate to high dose  3.  Accu-Cheks before meals and at bedtime  4.  Consider doing nuc med bone scan  5.  Craniotomy scheduled tentatively for Monday  6.  Continue Decadron  7.  Continue Keppra  8.  CBC and BMP in a.m.  9.  Monitor blood pressure closely and may require antihypertensive therapy    Thank you for this consultation we will gladly follow along with you.    I discussed the patient's findings and my recommendations with patient and Dr. Rojas.    LAURA Mcpherson  09/07/18  4:47 PM    I personally evaluated and examined the patient in conjunction with LAURA Moreno and agree with the assessment, treatment plan, and disposition of the patient as recorded by her. My history, exam, and further recommendations are:     78-year-old  man referred to our service for further evaluation and management of hyperglycemia.  He is known diabetic for the past 10 yrs and only takes metformin for his diabetes.    His last known Aic was between 7-8%.  He is decadron 4 mg every 8 hours her primary service due right parietal mass.      His most recent blood sugar 678.  I started him on Levemir to be given tonight.  Other blood sugar reading they were 383, 301.    I increased the ssi to moderate scale and asked to give extra 6 units Humalog (total of 20 units).  In instructed his nurse to recheck blood sugar 1 hour later.     He is awake and alert. No distress  Vitals:    09/07/18 1602   BP: 142/55    Pulse: 107   Resp: 16   Temp: 97.8 °F (36.6 °C)   SpO2: 95%   normal respiratory effort  s1 s2 rrr  Soft abd  Appropriate affect    I will check for stat BMP to check for AG and electrolytes.  At this point, he doesn't need insulin drip.  I anticipate his sugar will improve once decadron is stopped when appropriate from primary service.  I endorsed the case to [place] with Dr. Antonia Rojas MD  09/07/18  6:36 PM

## 2018-09-07 NOTE — PLAN OF CARE
Problem: Patient Care Overview  Goal: Plan of Care Review  Outcome: Ongoing (interventions implemented as appropriate)   09/07/18 0304   Coping/Psychosocial   Plan of Care Reviewed With patient   Plan of Care Review   Progress no change   OTHER   Outcome Summary Patient can become confused and impulsive at times. Very unsteady, uncoordinated and off balance. Free from falls. Cont to monitor.        Problem: Confusion, Acute (Adult)  Goal: Identify Related Risk Factors and Signs and Symptoms  Outcome: Outcome(s) achieved Date Met: 09/07/18    Goal: Cognitive/Functional Impairments Minimized  Outcome: Ongoing (interventions implemented as appropriate)    Goal: Safety  Outcome: Ongoing (interventions implemented as appropriate)      Problem: Fall Risk (Adult)  Goal: Identify Related Risk Factors and Signs and Symptoms  Outcome: Outcome(s) achieved Date Met: 09/07/18    Goal: Absence of Fall  Outcome: Ongoing (interventions implemented as appropriate)

## 2018-09-07 NOTE — PLAN OF CARE
Problem: Patient Care Overview  Goal: Plan of Care Review  Outcome: Ongoing (interventions implemented as appropriate)   09/06/18 2021   Coping/Psychosocial   Plan of Care Reviewed With patient   Plan of Care Review   Progress no change   OTHER   Outcome Summary Patient admitted with neoplasm of the brain. Possible crani with biopsy. No complaints of pain. Bed alarm set, gait unsteady. Patient confused at times.     Goal: Individualization and Mutuality  Outcome: Ongoing (interventions implemented as appropriate)    Goal: Discharge Needs Assessment  Outcome: Ongoing (interventions implemented as appropriate)    Goal: Interprofessional Rounds/Family Conf  Outcome: Ongoing (interventions implemented as appropriate)      Problem: Confusion, Acute (Adult)  Goal: Identify Related Risk Factors and Signs and Symptoms  Outcome: Ongoing (interventions implemented as appropriate)    Goal: Cognitive/Functional Impairments Minimized  Outcome: Ongoing (interventions implemented as appropriate)    Goal: Safety  Outcome: Ongoing (interventions implemented as appropriate)      Problem: Fall Risk (Adult)  Goal: Identify Related Risk Factors and Signs and Symptoms  Outcome: Ongoing (interventions implemented as appropriate)    Goal: Absence of Fall  Outcome: Ongoing (interventions implemented as appropriate)

## 2018-09-07 NOTE — THERAPY EVALUATION
Acute Care - Occupational Therapy Initial Evaluation  [facility]     Patient Name: Dwight Cobian  : 1940  MRN: [id number]  Today's Date: 2018  Onset of Illness/Injury or Date of Surgery: 18  Date of Referral to OT: 18  Referring Physician: Dr. Ervin    Admit Date: 2018       ICD-10-CM ICD-9-CM   1. Impaired mobility Z74.09 799.89   2. 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 No pertinent surgical history.       OT ASSESSMENT FLOWSHEET (last 72 hours)      Occupational Therapy Evaluation     Row Name 18 0948                   OT Evaluation Time/Intention    Subjective Information no complaints  -MM (r) SL (t) MM (c)        Document Type evaluation  -MM (r) SL (t) MM (c)        Mode of Treatment occupational therapy  -MM (r) SL (t) MM (c)        Patient Effort excellent  -MM (r) SL (t) MM (c)        Symptoms Noted During/After Treatment none  -MM (r) SL (t) MM (c)           General Information    Patient Profile Reviewed? yes  -MM (r) SL (t) MM (c)        Onset of Illness/Injury or Date of Surgery 18  -MM (r) SL (t) MM (c)        Referring Physician Dr. Ervin  -MM (r) SL (t) MM (c)        Patient Observations alert;cooperative;agree to therapy  -MM (r) SL (t) MM (c)        Patient/Family Observations Pt's nephew and his nephew's wife present for evaluation  -MM (r) SL (t) MM (c)        General Observations of Patient Fowlers in bed; L forearm IV   -MM (r) SL (t) MM (c)        Prior Level of Function independent:;all household mobility;community mobility;ADL's;driving;work   (~ 1 mo h/o  "cognitive issues, \"walking off\", L-side weak)  -MM (r) SL (t) MM (c)        Equipment Currently Used at Home walker, rolling;wheelchair;shower chair   From parents  -MM (r) SL (t) MM (c)        Pertinent History of Current Functional Problem 1 month h/o of processing and cognitive issues, leaving water running, walking off, trouble walking, LLE/LUE not functioning properly, Dx: neoplasm, brain. Per Dr. Ervin's H&P, pt has a large right occipital parietal ring-enhancing mass abutting the ventricles corpus callosum and falx; L renal cyst; and large right frontal enhancing mass.   -MM (r) SL (t) MM (c)        Existing Precautions/Restrictions fall  -MM (r) SL (t) MM (c)        Limitations/Impairments safety/cognitive  -MM (r) SL (t) MM (c)        Risks Reviewed patient and family:;LOB;nausea/vomiting;dizziness;increased discomfort  -MM (r) SL (t) MM (c)        Benefits Reviewed patient and family:;improve function;increase independence;increase strength;increase balance  -MM (r) SL (t) MM (c)        Barriers to Rehab medically complex;cognitive status;impaired sensation   impaired proprioception LLE   -MM (r) SL (t) MM (c)           Relationship/Environment    Lives With spouse  -MM (r) SL (t) MM (c)        Family Caregiver if Needed spouse  -MM (r) SL (t) MM (c)           Resource/Environmental Concerns    Current Living Arrangements home/apartment/condo  -MM (r) SL (t) MM (c)        Resource/Environmental Concerns none  -MM (r) SL (t) MM (c)           Stairs Within Home, Primary    Stairs, Within Home, Primary Pt reports he wouldn't need to climb up/down stairs, as they primarily live downstairs.  -MM (r) SL (t) MM (c)        Number of Stairs, Within Home, Primary other (see comments)   ~ 20, per nephew   -MM (r) SL (t) MM (c)        Stair Railings, Within Home, Primary railings on both sides of stairs  -MM (r) SL (t) MM (c)           Cognitive Assessment/Interventions    Additional Documentation Cognitive " Assessment/Intervention (Group)  -MM (r) SL (t) MM (c)           Cognitive Assessment/Intervention- PT/OT    Affect/Mental Status (Cognitive) WFL  -MM (r) SL (t) MM (c)        Orientation Status (Cognition) oriented x 4  -MM (r) SL (t) MM (c)        Follows Commands (Cognition) follows multi-step commands;75-90% accuracy;repetition of directions required;verbal cues/prompting required  -MM (r) SL (t) MM (c)        Cognitive Function (Cognitive) memory deficit;safety deficit   Per family report   -MM (r) SL (t) MM (c)        Memory Deficit (Cognitive) moderate deficit;short term memory;recall, recent events  -MM (r) SL (t) MM (c)        Safety Deficit (Cognitive) moderate deficit;awareness of need for assistance;impulsivity;insight into deficits/self awareness  -MM (r) SL (t) MM (c)        Cognitive Interventions (Cognitive) occupation/activity based interventions;environmental modifications  -MM (r) SL (t) MM (c)        Personal Safety Interventions fall prevention program maintained;gait belt;muscle strengthening facilitated;nonskid shoes/slippers when out of bed;supervised activity  -MM (r) SL (t) MM (c)           Safety Issues, Functional Mobility    Safety Issues Affecting Function (Mobility) insight into deficits/self awareness;impulsivity  -MM (r) SL (t) MM (c)        Impairments Affecting Function (Mobility) balance;postural/trunk control;sensation/sensory awareness;coordination;cognition  -MM (r) SL (t) MM (c)           Bed Mobility Assessment/Treatment    Bed Mobility Assessment/Treatment supine-sit;sit-supine  -MM (r) SL (t) MM (c)        Supine-Sit Sevier (Bed Mobility) supervision  -MM (r) SL (t) MM (c)        Sit-Supine Sevier (Bed Mobility) supervision  -MM (r) SL (t) MM (c)           Functional Mobility    Functional Mobility- Ind. Level contact guard assist;minimum assist (75% patient effort);2 person assist required  -MM (r) SL (t) MM (c)        Functional Mobility- Safety Issues step  length decreased;balance decreased during turns  -MM (r) SL (t) MM (c)        Functional Mobility- Comment During simulated community mobility activity, pt ambulated in the hallway with assist levels ranging from CGA x2 to Min A x2. Pt experienced occasional L LOB, but was able to recover with Min A. Pt also demo's occasional scissoring of the R foot and compensates by rolling his L ankle.   -MM (r) SL (t) MM (c)           Transfer Assessment/Treatment    Transfer Assessment/Treatment sit-stand transfer;stand-sit transfer  -MM (r) SL (t) MM (c)           Sit-Stand Transfer    Sit-Stand Rice (Transfers) contact guard  -MM (r) SL (t) MM (c)           Stand-Sit Transfer    Stand-Sit Rice (Transfers) contact guard  -MM (r) SL (t) MM (c)           ADL Assessment/Intervention    BADL Assessment/Intervention lower body dressing  -MM (r) SL (t) MM (c)           Lower Body Dressing Assessment/Training    Lower Body Dressing Rice Level doff;don;socks;minimum assist (75% patient effort)  -MM (r) SL (t) MM (c)        Lower Body Dressing Position edge of bed sitting  -MM (r) SL (t) MM (c)        Comment (Lower Body Dressing) While seated EOB, pt adjusted slipper socks when prompted with Min A for balance 2' posterior lean. Pt DOFFED R sock when prompted to pull the R sock up.   -MM (r) SL (t) MM (c)           BADL Safety/Performance    Skilled BADL Treatment/Intervention cognitive/safety deficit modifications;environmental modifications;BADL process/adaptation training;adaptive equipment training  -MM (r) SL (t) MM (c)           General ROM    GENERAL ROM COMMENTS BUE and BLE AROM WFL.  -MM (r) SL (t) MM (c)           MMT (Manual Muscle Testing)    General MMT Comments RUE strength grossly 5/5. LUE shoulder flexion 4+/5. LUE elbow flexion 5/5.  -MM (r) SL (t) MM (c)           Motor Assessment/Interventions    Additional Documentation Fine Motor Testing & Training (Group);Gross Motor Coordination (Group)   -MM (r) SL (t) MM (c)           Gross Motor Coordination    Gross Motor Impairments balance;coordination;postural control;sensation  -MM (r) SL (t) MM (c)        Gross Motor Skill, Impairments Detail impaired L heel to shin; impaired L index finger to nose coordination; impaired LUE coordination with rapid alternating movements; impaired L opposition   -MM (r) SL (t) MM (c)           Balance    Balance dynamic balance activity;standing balance activity;dynamic sitting balance;static sitting balance  -MM (r) SL (t) MM (c)           Static Sitting Balance    Level of Glynn (Unsupported Sitting, Static Balance) contact guard assist;standby assist  -MM (r) SL (t) MM (c)        Sitting Position (Unsupported Sitting, Static Balance) sitting on edge of bed  -MM (r) SL (t) MM (c)        Comment (Unsupported Sitting, Static Balance) Pt sat EOB while undergoing sensation testing with assist levels varying from CGA to SBA 2' posterior lean and balance deficits.  -MM (r) SL (t) MM (c)           Dynamic Sitting Balance    Level of Glynn, Reaches Outside Midline (Sitting, Dynamic Balance) minimal assist, 75% patient effort  -MM (r) SL (t) MM (c)        Sitting Position, Reaches Outside Midline (Sitting, Dynamic Balance) sitting on edge of bed  -MM (r) SL (t) MM (c)        Comment, Reaches Outside Midline (Sitting, Dynamic Balance) While sitting EOB, pt DONNED/DOFFED slipper socks with Min A for balance 2' posterior lean.  -MM (r) SL (t) MM (c)           Static Standing Balance    Level of Glynn (Supported Standing, Static Balance) minimal assist, 75% patient effort  -MM (r) SL (t) MM (c)        Time Able to Maintain Position (Supported Standing, Static Balance) less than 15 seconds  -MM (r) SL (t) MM (c)           Standing Balance Activity    Activities Performed (Standing, Balance Training) feet together in stance  -MM (r) SL (t) MM (c)        Support Needed for Balance (Standing, Balance Training) minimal  external support for balance, 75% patient effort;balances without upper extremity support  -MM (r) SL (t) MM (c)           Dynamic Balance Activity    Therapeutic Training Performed (Dynamic Balance) ambulate backward;side stepping  -MM (r) SL (t) MM (c)           Fine Motor Testing & Training    Fine Motor Tests other (see comments)   opposition  -MM (r) SL (t) MM (c)        Comment, Fine Motor Coordination Pt demo's deficits w/ L digit 3-5 opposition. Pt required verbal and tactile cues to correct.  -MM (r) SL (t) MM (c)           Sensory Assessment/Intervention    Sensory General Assessment light touch sensation deficits identified;proprioception deficits identified   LLE  -MM (r) SL (t) MM (c)           Light Touch Sensation Assessment    Left Lower Extremity: Light Touch Sensation Assessment severe impairment, less than 50% correct responses  -MM (r) SL (t) MM (c)        Comment, Left Lower Extremity: Light Touch Sensation Assessment Pt demo's severe impairments in correctly identifying location of light touch on LLE.   -MM (r) SL (t) MM (c)           Proprioception Assessment    Left Lower Extremity: Proprioception Assessment severe impairment, less than 50% correct responses  -MM (r) SL (t) MM (c)        Comment, Left Lower Extremity: Proprioception Assessment Pt demo's severe impairment in correctly identifying location of LLE in space (plantar flexion, dorsiflexion, big toe extension/flexion)  -MM (r) SL (t) MM (c)           Positioning and Restraints    Pre-Treatment Position in bed  -MM (r) SL (t) MM (c)        Post Treatment Position bed  -MM (r) SL (t) MM (c)        In Bed fowlers;call light within reach;encouraged to call for assist;with family/caregiver;side rails up x2;SCD pump applied  -MM (r) SL (t) MM (c)           Pain Assessment    Additional Documentation Pain Scale: Numbers Pre/Post-Treatment (Group)  -MM (r) SL (t) MM (c)           Pain Scale: Numbers Pre/Post-Treatment    Pain Scale:  Numbers, Pretreatment 0/10 - no pain  -MM (r) SL (t) MM (c)        Pain Scale: Numbers, Post-Treatment 0/10 - no pain  -MM (r) SL (t) MM (c)        Pain Intervention(s) Repositioned;Ambulation/increased activity  -MM (r) SL (t) MM (c)           Clinical Impression (OT)    Date of Referral to OT 09/06/18  -MM (r) SL (t) MM (c)        OT Diagnosis Decline in ADLs  -MM (r) SL (t) MM (c)        Prognosis (OT Eval) Fair  -MM (r) SL (t) MM (c)        Patient/Family Goals Statement (OT Eval) Pt would like to regain independence.  -MM (r) SL (t) MM (c)        Criteria for Skilled Therapeutic Interventions Met (OT Eval) yes;treatment indicated  -MM (r) SL (t) MM (c)        Rehab Potential (OT Eval) good, to achieve stated therapy goals  -MM (r) SL (t) MM (c)        Therapy Frequency (OT Eval) daily  -MM (r) SL (t) MM (c)        Predicted Duration of Therapy Intervention (Therapy Eval) until hospital d/c  -MM (r) SL (t) MM (c)        Care Plan Review (OT) evaluation/treatment results reviewed;care plan/treatment goals reviewed;risks/benefits reviewed;current/potential barriers reviewed;patient/other agree to care plan  -MM (r) SL (t) MM (c)        Care Plan Review, Other Participant (OT Eval) family  -MM (r) SL (t) MM (c)        Anticipated Equipment Needs at Discharge (OT) shower chair;raised toilet seat;front wheeled walker  -MM (r) SL (t) MM (c)        Anticipated Discharge Disposition (OT) inpatient rehabilitation facility  -MM (r) SL (t) MM (c)           Planned OT Interventions    Planned Therapy Interventions (OT Eval) occupation/activity based interventions;patient/caregiver education/training;functional balance retraining;cognitive/visual perception retraining;BADL retraining;adaptive equipment training;activity tolerance training;neuromuscular control/coordination retraining;strengthening exercise;transfer/mobility retraining  -MM (r) SL (t) MM (c)           OT Goals    Dressing Goal Selection (OT) dressing, OT goal  1  -MM (r) SL (t) MM (c)        Toileting Goal Selection (OT) toileting, OT goal 1  -MM (r) SL (t) MM (c)        Coordination Goal Selection (OT) coordination, OT goal 1  -MM (r) SL (t) MM (c)        Additional Documentation Coordination Goal Selection (OT) (Row)  -MM (r) SL (t) MM (c)           Dressing Goal 1 (OT)    Activity/Assistive Device (Dressing Goal 1, OT) dressing skills, all  -MM (r) SL (t) MM (c)        Tompkins/Cues Needed (Dressing Goal 1, OT) minimum assist (75% or more patient effort)  -MM (r) SL (t) MM (c)        Time Frame (Dressing Goal 1, OT) by discharge  -MM (r) SL (t) MM (c)        Barriers (Dressing Goal 1, OT) .  -MM (r) SL (t) MM (c)        Progress/Outcome (Dressing Goal 1, OT) goal ongoing  -MM (r) SL (t) MM (c)           Toileting Goal 1 (OT)    Activity/Device (Toileting Goal 1, OT) toileting skills, all  -MM (r) SL (t) MM (c)        Tompkins Level/Cues Needed (Toileting Goal 1, OT) contact guard assist  -MM (r) SL (t) MM (c)        Time Frame (Toileting Goal 1, OT) by discharge  -MM (r) SL (t) MM (c)        Barriers (Toileting Goal 1, OT) .  -MM (r) SL (t) MM (c)        Progress/Outcome (Toileting Goal 1, OT) goal ongoing  -MM (r) SL (t) MM (c)           Coordination Goal 1 (OT)    Activity/Assistive Device (Coordination Goal 1, OT) FM task;GM task  -MM (r) SL (t) MM (c)        Tompkins Level/Cues Needed (Coordination Goal 1, OT) supervision required;verbal cues required  -MM (r) SL (t) MM (c)        Time Frame (Coordination Goal 1, OT) by discharge  -MM (r) SL (t) MM (c)        Barriers (Coordination Goal 1, OT) .  -MM (r) SL (t) MM (c)        Progress/Outcomes (Coordination Goal 1, OT) goal ongoing  -MM (r) SL (t) MM (c)           Living Environment    Home Accessibility stairs within home;tub/shower is not walk in  -MM (r) SL (t) MM (c)          User Key  (r) = Recorded By, (t) = Taken By, (c) = Cosigned By    Initials Name Effective Dates    Wilfrid Rodriguez, OTR/L  04/03/18 -      Kirstie Hernandez, OT Student 07/03/18 -            Occupational Therapy Education     Title: PT OT SLP Therapies (Done)     Topic: Occupational Therapy (Done)     Point: ADL training (Done)     Description: Instruct learner(s) on proper safety adaptation and remediation techniques during self care or transfers.   Instruct in proper use of assistive devices.   Learning Progress Summary     Learner Status Readiness Method Response Comment Documented by    Patient Done Acceptance E SUE,JUAN,NR Pt and family educated regarding OT POC, fall precautions, purpose/use of a gait belt, and body mechanics to increase safety/independence in completing ADL's.  09/07/18 1359          Point: Precautions (Done)     Description: Instruct learner(s) on prescribed precautions during self-care and functional transfers.   Learning Progress Summary     Learner Status Readiness Method Response Comment Documented by    Patient Done Acceptance E SUE,JUAN,NR Pt and family educated regarding OT POC, fall precautions, purpose/use of a gait belt, and body mechanics to increase safety/independence in completing ADL's.  09/07/18 1359          Point: Body mechanics (Done)     Description: Instruct learner(s) on proper positioning and spine alignment during self-care, functional mobility activities and/or exercises.   Learning Progress Summary     Learner Status Readiness Method Response Comment Documented by    Patient Done Acceptance E SUE,JUAN,NR Pt and family educated regarding OT POC, fall precautions, purpose/use of a gait belt, and body mechanics to increase safety/independence in completing ADL's.  09/07/18 1354                      User Key     Initials Effective Dates Name Provider Type Discipline     07/03/18 -  Kirstie Hernandez, OT Student OT Student OT                  OT Recommendation and Plan  Outcome Summary/Treatment Plan (OT)  Anticipated Equipment Needs at Discharge (OT): shower chair, raised toilet seat,  "front wheeled walker  Anticipated Discharge Disposition (OT): inpatient rehabilitation facility  Planned Therapy Interventions (OT Eval): occupation/activity based interventions, patient/caregiver education/training, functional balance retraining, cognitive/visual perception retraining, BADL retraining, adaptive equipment training, activity tolerance training, neuromuscular control/coordination retraining, strengthening exercise, transfer/mobility retraining  Therapy Frequency (OT Eval): daily  Plan of Care Review  Plan of Care Reviewed With: patient, family  Plan of Care Reviewed With: patient, family  Outcome Summary: OT eval complete. Pt requires supervision for safety in performing bed mobility. Pt demo's very slight weakness of LUE shoulder flexion. Pt also demo's impaired sensation, coordination, and proprioception on his L side. Pt demo's deficits w/ L digit 3-5 opposition. Pt required verbal and tactile cues to correct. While seated EOB, pt adjusted L sock when prompted with Min A for balance 2' posterior lean. Pt DOFFED R sock when instructed to \"pull up\" his R sock. Pt demo'd difficulty with maintaining dynamic sitting balance and static standing balance, requiring assistance. During simulated community mobility activity, pt demo'd occasional LOB to the L, requiring Min A to correct. Mr. Cobian would benefit from continued OT services while in the hospital to improve safety and independence in completing ADLs and functional mobility. I recommend Mr. Cobian attend inpatient acute rehab at d/c.           Outcome Measures     Row Name 09/07/18 1000 09/07/18 0948          How much help from another person do you currently need...    Turning from your back to your side while in flat bed without using bedrails? 4  -LH  --     Moving from lying on back to sitting on the side of a flat bed without bedrails? 4  -LH  --     Moving to and from a bed to a chair (including a wheelchair)? 3  -LH  --     Standing up " from a chair using your arms (e.g., wheelchair, bedside chair)? 3  -LH  --     Climbing 3-5 steps with a railing? 3  -LH  --     To walk in hospital room? 3  -LH  --     AM-PAC 6 Clicks Score 20  -LH  --        How much help from another is currently needed...    Putting on and taking off regular lower body clothing?  -- 2  -MM (r) SL (t) MM (c)     Bathing (including washing, rinsing, and drying)  -- 2  -MM (r) SL (t) MM (c)     Toileting (which includes using toilet bed pan or urinal)  -- 3  -MM (r) SL (t) MM (c)     Putting on and taking off regular upper body clothing  -- 3  -MM (r) SL (t) MM (c)     Taking care of personal grooming (such as brushing teeth)  -- 4  -MM (r) SL (t) MM (c)     Eating meals  -- 4  -MM (r) SL (t) MM (c)     Score  -- 18  -MM (r) SL (t)        Functional Assessment    Outcome Measure Options AM-PAC 6 Clicks Basic Mobility (PT)  - AM-PAC 6 Clicks Daily Activity (OT)  -MM (r) SL (t) MM (c)       User Key  (r) = Recorded By, (t) = Taken By, (c) = Cosigned By    Initials Name Provider Type     Jose Medrano, PT Physical Therapist    Wilfrid Rodriguez, OTR/L Occupational Therapist    SL Kirstie Hernandez, OT Student OT Student          Time Calculation:         Time Calculation- OT     Row Name 09/07/18 0948             Time Calculation- OT    OT Start Time 0948   Add 20 minutes for chart review 9/6 & 9/7.  -MM (r) SL (t) MM (c)      OT Stop Time 1011  -MM (r) SL (t) MM (c)      OT Time Calculation (min) 23 min  -MM (r) SL (t)      OT Received On 09/07/18  -MM (r) SL (t) MM (c)      OT Goal Re-Cert Due Date 09/17/18  -MM (r) SL (t) MM (c)        User Key  (r) = Recorded By, (t) = Taken By, (c) = Cosigned By    Initials Name Provider Type    MM Wilfrid Hall, OTR/L Occupational Therapist    Kirstie Burnett, OT Student OT Student        Therapy Suggested Charges     Code   Minutes Charges    None           Therapy Charges for Today     Code Description Service Date Service  Provider Modifiers Qty    [id number]  OT EVAL HIGH COMPLEXITY 3 9/7/2018 Kirstie Hernandez, OT Student GO, KX 1          OT G-codes  OT Professional Judgement Used?: Yes  OT Functional Scales Options: AM-PAC 6 Clicks Daily Activity (OT)  Score: 18  Functional Limitation: Self care  Self Care Current Status (): At least 40 percent but less than 60 percent impaired, limited or restricted  Self Care Goal Status (): At least 20 percent but less than 40 percent impaired, limited or restricted    Kirstie Hernandez OT Student  9/7/2018

## 2018-09-07 NOTE — PROGRESS NOTES
Discharge Planning Assessment  [facility]     Patient Name: Dwight Cobian  MRN: [id number]  Today's Date: 9/7/2018    Admit Date: 9/6/2018          Discharge Needs Assessment     Row Name 09/07/18 1514       Living Environment    Lives With spouse    Name(s) of Who Lives With Patient Abril    Current Living Arrangements home/apartment/condo    Primary Care Provided by self    Provides Primary Care For no one    Family Caregiver if Needed spouse    Quality of Family Relationships supportive;involved;helpful    Able to Return to Prior Arrangements yes       Resource/Environmental Concerns    Resource/Environmental Concerns none    Transportation Concerns car, none       Transition Planning    Patient/Family Anticipates Transition to home with family    Patient/Family Anticipated Services at Transition none    Transportation Anticipated family or friend will provide       Discharge Needs Assessment    Readmission Within the Last 30 Days no previous admission in last 30 days    Concerns to be Addressed denies needs/concerns at this time    Equipment Currently Used at Home none    Equipment Needed After Discharge none    Row Name 09/07/18 1509       Living Environment    Lives With spouse    Name(s) of Who Lives With Patient Abril Cobian    Current Living Arrangements home/apartment/condo    Primary Care Provided by self    Provides Primary Care For no one, unable/limited ability to care for self    Family Caregiver if Needed spouse    Family Caregiver Names Abril. family lives next door.    Quality of Family Relationships helpful;involved;supportive    Able to Return to Prior Arrangements yes       Resource/Environmental Concerns    Resource/Environmental Concerns none    Transportation Concerns car, none       Transition Planning    Patient/Family Anticipates Transition to home with family    Patient/Family Anticipated Services at Transition none    Transportation Anticipated family or friend will provide        Discharge Needs Assessment    Readmission Within the Last 30 Days no previous admission in last 30 days    Concerns to be Addressed no discharge needs identified    Equipment Currently Used at Home none    Anticipated Changes Related to Illness     Equipment Needed After Discharge other (see comments)   will follow for post op needs.     Discharge Coordination/Progress Plan for home with family. Surgery scheduled per Dr Ervin Monday, 9/10. Will follow for post op needs. DME in home if needed. Good family support. No needs at present. wife in home 24/7.             Discharge Plan     Row Name 09/07/18 3296       Plan    Plan Home    Patient/Family in Agreement with Plan yes    Plan Comments Spoke with wife to assess for home needs. Pt will return home with her at d/c.  Pt was independent before this onset, will need to follow-up post OP.  No needs identified at present. Will follow.         Destination     No service coordination in this encounter.      Durable Medical Equipment     No service coordination in this encounter.      Dialysis/Infusion     No service coordination in this encounter.      Home Medical Care     No service coordination in this encounter.      Social Care     No service coordination in this encounter.                Demographic Summary    No documentation.           Functional Status    No documentation.           Psychosocial    No documentation.           Abuse/Neglect    No documentation.           Legal    No documentation.           Substance Abuse    No documentation.           Patient Forms    No documentation.         STACEY Martinez

## 2018-09-07 NOTE — THERAPY TREATMENT NOTE
Acute Care - Physical Therapy Treatment Note  [facility]     Patient Name: Dwight Cobian  : 1940  MRN: [id number]  Today's Date: 2018  Onset of Illness/Injury or Date of Surgery: 18  Date of Referral to PT: 18  Referring Physician: Dr. Ervin    Admit Date: 2018    Visit Dx:    ICD-10-CM ICD-9-CM   1. Impaired mobility Z74.09 799.89   2. 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)   • Unsteady gait   • Forgetfulness   • Neoplasm, brain (CMS/HCC)       Therapy Treatment          Rehabilitation Treatment Summary     Row Name 18 1434             Treatment Time/Intention    Discipline physical therapy assistant  -MF      Document Type therapy note (daily note)  -MF2      Subjective Information no complaints  -MF2      Mode of Treatment physical therapy  -2      Existing Precautions/Restrictions fall  -2      Recorded by [MF] Rhonda Polk, [facility] 18 1434  [MF2] Rhonda Polk, [facility] 18 1500      Row Name 18 1434             Bed Mobility Assessment/Treatment    Supine-Sit [place] (Bed Mobility) supervision  -      Sit-Supine [place] (Bed Mobility) supervision  -      Recorded by [MF] Rhonda Polk, [facility] 18 1500      Row Name 18 1434             Sit-Stand Transfer    Sit-Stand [place] (Transfers) contact guard  -      Recorded by [MF] Rhonda Polk, [facility] 18 1500      Row Name 18 1434             Stand-Sit Transfer    Stand-Sit [place] (Transfers) contact guard  -      Recorded by [] Rhonda Polk, [facility] 18 1500      Row Name 18 1434             Gait/Stairs Assessment/Training    [place] Level (Gait) verbal cues;contact guard;minimum assist (75% patient effort)  -      Distance in Feet (Gait) 90   x 2  -MF2      Deviations/Abnormal  Patterns (Gait) stride length decreased;base of support, narrow  -MF2      Bilateral Gait Deviations --   feet bilaterally turned it  -MF2      Recorded by [MF] Rhonda Polk, PTA 09/07/18 1500  [MF2] Rhonda Polk, PTA 09/07/18 1509      Row Name 09/07/18 1434             Therapeutic Exercise    Lower Extremity (Therapeutic Exercise) LAQ (long arc quad), bilateral  -MF      Comment (Therapeutic Exercise) Worked on kicking target while sitting EOB  -MF      Recorded by [MF] Rhonda Polk, PTA 09/07/18 1509      Row Name 09/07/18 1434             Static Sitting Balance    Level of White (Unsupported Sitting, Static Balance) supervision  -MF      Sitting Position (Unsupported Sitting, Static Balance) sitting on edge of bed  -MF      Recorded by [MF] Rhonda Polk, PTA 09/07/18 1509      Row Name 09/07/18 1434             Static Standing Balance    Level of White (Supported Standing, Static Balance) contact guard assist  -MF      Time Able to Maintain Position (Supported Standing, Static Balance) --   worked on static standing with feet together/apart  -MF      Recorded by [MF] Rhonda Polk, PTA 09/07/18 1509      Row Name 09/07/18 1434             Standing Balance Activity    Support Needed for Balance (Standing, Balance Training) --   reaching in all directions, CGA  -MF      Recorded by [MF] Rhonda Polk, PTA 09/07/18 1509      Row Name 09/07/18 1434             Dynamic Balance Activity    Therapeutic Training Performed (Dynamic Balance) side stepping;backward walking  -MF      Recorded by [MF] Rhonda Polk, PTA 09/07/18 1509      Row Name 09/07/18 1434             Positioning and Restraints    Pre-Treatment Position in bed  -MF      Post Treatment Position bed  -MF      In Bed fowlers;call light within reach;encouraged to call for assist;with family/caregiver;side rails up x2;SCD pump applied  -MF      Recorded by [MF] Rhonda Polk, [facility] 09/07/18 1509       Row Name 09/07/18 1434             Pain Scale: Numbers Pre/Post-Treatment    Pain Scale: Numbers, Pretreatment 0/10 - no pain  -      Pain Scale: Numbers, Post-Treatment 0/10 - no pain  -      Recorded by [] Felicitas Rhonda LEONARDODennis, PTA 09/07/18 1509        User Key  (r) = Recorded By, (t) = Taken By, (c) = Cosigned By    Initials Name Effective Dates Discipline     Felicitas Rhonad GIRON, PTA 08/02/16 -  PT                       PT Rehab Goals     Row Name 09/07/18 0925             Transfer Goal 1 (PT)    Activity/Assistive Device (Transfer Goal 1, PT) sit-to-stand/stand-to-sit;bed-to-chair/chair-to-bed  -      [place] Level/Cues Needed (Transfer Goal 1, PT) standby assist  -      Time Frame (Transfer Goal 1, PT) by discharge  -      Progress/Outcome (Transfer Goal 1, PT) goal ongoing  -         Gait Training Goal 1 (PT)    Activity/Assistive Device (Gait Training Goal 1, PT) gait (walking locomotion);diminish gait deviation;improve balance and speed  -      [place] Level (Gait Training Goal 1, PT) standby assist  -      Distance (Gait Goal 1, PT) 200 feet  -      Time Frame (Gait Training Goal 1, PT) by discharge  -      Progress/Outcome (Gait Training Goal 1, PT) goal ongoing  -        User Key  (r) = Recorded By, (t) = Taken By, (c) = Cosigned By    Initials Name Provider Type Discipline     Jose Medrano, PT Physical Therapist PT          Physical Therapy Education     Title: PT OT SLP Therapies (Done)     Topic: Physical Therapy (Done)     Point: Mobility training (Done)    Learning Progress Summary     Learner Status Readiness Method Response Comment Documented by    Patient Done Acceptance E,D DU,VU benefits of PT and POC, [facility] 09/07/18 1122    Family Done Acceptance E,D DU,VU benefits of PT and POC, call for assist [facility] [place] 09/07/18 1122          Point: Precautions (Done)    Learning Progress Summary     Learner Status Readiness Method Response Comment  Documented by    Patient Done Acceptance JOSE PATTERSON VU benefits of PT and POC, call for assist [facility]  09/07/18 1122    Family Done Acceptance JOSE PATTERSON VU benefits of PT and POC, call for assist [facility]  09/07/18 1122                      User Key     Initials Effective Dates Name Provider Type Discipline     08/02/16 -  Jose Medrano, PT Physical Therapist PT                    PT Recommendation and Plan                Outcome Measures     Row Name 09/07/18 1000 09/07/18 0948          How much help from another person do you currently need...    Turning from your back to your side while in flat bed without using bedrails? 4  -LH  --     Moving from lying on back to sitting on the side of a flat bed without bedrails? 4  -LH  --     Moving to and from a bed to a chair (including a wheelchair)? 3  -LH  --     Standing up from a chair using your arms (e.g., wheelchair, bedside chair)? 3  -LH  --     Climbing 3-5 steps with a railing? 3  -LH  --     To walk in hospital room? 3  -LH  --     AM-PAC 6 Clicks Score 20  -LH  --        How much help from another is currently needed...    Putting on and taking off regular lower body clothing?  -- 2  -MM (r) SL (t) MM (c)     Bathing (including washing, rinsing, and drying)  -- 2  -MM (r) SL (t) MM (c)     Toileting (which includes using toilet bed pan or urinal)  -- 3  -MM (r) SL (t) MM (c)     Putting on and taking off regular upper body clothing  -- 3  -MM (r) SL (t) MM (c)     Taking care of personal grooming (such as brushing teeth)  -- 4  -MM (r) SL (t) MM (c)     Eating meals  -- 4  -MM (r) SL (t) MM (c)     Score  -- 18  -MM (r) SL (t)        Functional Assessment    Outcome Measure Options AM-PAC 6 Clicks Basic Mobility (PT)  - AM-PAC 6 Clicks Daily Activity (OT)  -MM (r) SL (t) MM (c)       User Key  (r) = Recorded By, (t) = Taken By, (c) = Cosigned By    Initials Name Provider Type     Jose Medrano, PT Physical Therapist    MM Wilfrid Hall, OTR/L Occupational  Therapist    Kirstie Burnett, OT Student OT Student           Time Calculation:         PT Charges     Row Name 09/07/18 1509 09/07/18 1129          Time Calculation    Start Time 1434  - 0925  -     Stop Time 1500  - 1020  -     Time Calculation (min) 26 min  - 55 min  -     PT Received On 09/07/18  - 09/07/18  -     PT Goal Re-Cert Due Date 09/17/18  - 09/17/18  -        Time Calculation- PT    Total Timed Code Minutes- PT 26 minute(s)  -  --        Timed Charges    88274 - Gait Training Minutes  26  -  --       User Key  (r) = Recorded By, (t) = Taken By, (c) = Cosigned By    Initials Name Provider Type    Jose Alvarez, PT Physical Therapist    Rhonda Leija, PTA Physical Therapy Assistant        Therapy Suggested Charges     Code   Minutes Charges    87838 (CPT®) Hc Pt Neuromusc Re Education Ea 15 Min      22024 (CPT®) Hc Pt Ther Proc Ea 15 Min      06666 (CPT®) Hc Gait Training Ea 15 Min 26 2    93305 (CPT®) Hc Pt Therapeutic Act Ea 15 Min      32088 (CPT®) Hc Pt Manual Therapy Ea 15 Min      20717 (CPT®) Hc Pt Iontophoresis Ea 15 Min      11564 (CPT®) Hc Pt Elec Stim Ea-Per 15 Min      76618 (CPT®) Hc Pt Ultrasound Ea 15 Min      39030 (CPT®) Hc Pt Self Care/Mgmt/Train Ea 15 Min      85500 (CPT®) Hc Pt Prosthetic (S) Train Initial Encounter, Each 15 Min      08208 (CPT®) Hc Pt Orthotic(S)/Prosthetic(S) Encounter, Each 15 Min      68081 (CPT®) Hc Orthotic(S) Mgmt/Train Initial Encounter, Each 15min      Total  26 2        Therapy Charges for Today     Code Description Service Date Service Provider Modifiers Qty    88471473035 HC GAIT TRAINING EA 15 MIN 9/7/2018 Rhonda Polk PTA GP, KX 2          PT G-Codes  Outcome Measure Options: AM-PAC 6 Clicks Basic Mobility (PT)  AM-PAC 6 Clicks Score: 20  Score: 18  Functional Limitation: Mobility: Walking and moving around  Mobility: Walking and Moving Around Current Status (): At least 20 percent but less than 40  percent impaired, limited or restricted  Mobility: Walking and Moving Around Goal Status (): At least 1 percent but less than 20 percent impaired, limited or restricted    Rhonda Polk, PTA  9/7/2018

## 2018-09-07 NOTE — PROGRESS NOTES
Discharge Planning Assessment  [facility]     Patient Name: Dwight Cobian  MRN: [id number]  Today's Date: 9/7/2018    Admit Date: 9/6/2018          Discharge Needs Assessment     Row Name 09/07/18 1509       Living Environment    Lives With spouse    Name(s) of Who Lives With Patient Abril Cobian    Current Living Arrangements home/apartment/condo    Primary Care Provided by self    Provides Primary Care For no one, unable/limited ability to care for self    Family Caregiver if Needed spouse    Family Caregiver Names Abril. family lives next door.    Quality of Family Relationships helpful;involved;supportive    Able to Return to Prior Arrangements yes       Resource/Environmental Concerns    Resource/Environmental Concerns none    Transportation Concerns car, none       Transition Planning    Patient/Family Anticipates Transition to home with family    Patient/Family Anticipated Services at Transition none    Transportation Anticipated family or friend will provide       Discharge Needs Assessment    Readmission Within the Last 30 Days no previous admission in last 30 days    Concerns to be Addressed no discharge needs identified    Equipment Currently Used at Home bath bench    Anticipated Changes Related to Illness inability to care for self    Equipment Needed After Discharge other (see comments)   will follow for post op needs.     Discharge Coordination/Progress Plan for home with family. Surgery scheduled per Dr Ervin Monday, 9/10. Will follow for post op needs. DME in home if needed. Good family support. No needs at present. wife in home 24/7.             Discharge Plan    No documentation.       Destination     No service coordination in this encounter.      Durable Medical Equipment     No service coordination in this encounter.      Dialysis/Infusion     No service coordination in this encounter.      Home Medical Care     No service coordination in this encounter.      Social Care     No service  coordination in this encounter.                Demographic Summary    No documentation.           Functional Status    No documentation.           Psychosocial    No documentation.           Abuse/Neglect    No documentation.           Legal    No documentation.           Substance Abuse    No documentation.           Patient Forms    No documentation.         Diamond Chen RN

## 2018-09-07 NOTE — PROGRESS NOTES
Dwight Valadezph  78 y.o.      Chief complaint:   Brain tumor    Subjective  No complaints this am    Temp:  [97.4 °F (36.3 °C)-98.7 °F (37.1 °C)] 98 °F (36.7 °C)  Heart Rate:  [60-94] 60  Resp:  [16-18] 16  BP: (130-150)/(53-67) 150/53      Objective    Neurologic Exam  Mental Status   Oriented to person, place, and time.   Attention: normal.   Speech: speech is normal   Level of consciousness: alert     Cranial Nerves   Cranial nerves II through XII intact.      Motor Exam   Muscle bulk: normal  Overall muscle tone: normal  Right arm pronator drift: absent  Left arm pronator drift: absent     Strength   Strength 5/5 except as noted. Some dysmetria and difficulty with rapid alternating movements the left hand      Sensory Exam   Light touch normal.   Pinprick normal.      Gait, Coordination, and Reflexes      Gait  Gait: (Unsteady gait)     Coordination   Romberg: positive  Finger to nose coordination: abnormal  Tandem walking coordination: abnormal     Tremor   Resting tremor: absent  Intention tremor: absent  Action tremor: absent     Reflexes   Right biceps: 1+  Left biceps: 1+  Right patellar: 1+  Left patellar: 1+  Right Goetz: absent  Left Goetz: absent  Right ankle clonus: absent  Left ankle clonus: absent  Active Problems:    Neoplasm, brain (CMS/HCC)      Lab Results (last 24 hours)     Procedure Component Value Units Date/Time    POC Glucose Once [[id number]]  (Abnormal) Collected:  09/07/18 1047    Specimen:  Blood Updated:  09/07/18 1059     Glucose 301 (H) mg/dL      Comment: : [id number] Reasor NathanMeter ID: [id number]       POC Glucose Once [[id number]]  (Abnormal) Collected:  09/07/18 0734    Specimen:  Blood Updated:  09/07/18 0745     Glucose 383 (H) mg/dL      Comment: : [id number] Reasor NathanMeter ID: [id number]       POC Glucose Once [[id number]]  (Abnormal) Collected:  09/06/18 2014    Specimen:  Blood Updated:  09/06/18 2026     Glucose 346 (H) mg/dL      Comment: : [id number]  Pura Packcamelia ID: [id number]       POC Glucose Once [[id number]]  (Abnormal) Collected:  09/06/18 1830    Specimen:  Blood Updated:  09/06/18 1841     Glucose 379 (H) mg/dL      Comment: : [id number] Gloria MeganMeter ID: [id number]                 Plan:   Primary brain tumor, CT torso negative, renal ultra sound ordered. HOspitalist to see for BG assistance    Mika Ervin MD

## 2018-09-07 NOTE — THERAPY EVALUATION
"Acute Care - Physical Therapy Initial Evaluation  [facility]     Patient Name: Dwight Cobian  : 1940  MRN: [id number]  Today's Date: 2018   Onset of Illness/Injury or Date of Surgery: 18  Date of Referral to PT: 18  Referring Physician: Dr. Ervin      Admit Date: 2018    Visit Dx:     ICD-10-CM ICD-9-CM   1. 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 No pertinent surgical history.     PT ASSESSMENT (last 12 hours)      Physical Therapy Evaluation     Row Name 18 0925          PT Evaluation Time/Intention    Subjective Information no complaints  -     Document Type evaluation  -     Mode of Treatment physical therapy  -     Row Name 1825          General Information    Patient Profile Reviewed? yes  -     Onset of Illness/Injury or Date of Surgery 18  -     Referring Physician Dr. Ervin  -     Patient Observations alert;cooperative;agree to therapy  -     Patient/Family Observations nephew and his wife in room  -     General Observations of Patient fowlers in bed  -     Prior Level of Function independent:;all household mobility;community mobility;ADL's;home management;driving;work;yard work   ~1 mo h/o cognitive issues, \"walking off\", L side weak  -     Equipment Currently Used at Home walker, rolling;wheelchair;shower chair  -     Pertinent History of Current Functional Problem 1 month h/o of processing and cognitive issues, leaving water running, walking off, trouble walking, LLE/LUE not functioning properly, Dx:  neoplasm, brain  -     Existing " Precautions/Restrictions fall  -     Limitations/Impairments safety/cognitive  -     Risks Reviewed patient and family:;LOB;nausea/vomiting;increased discomfort;dizziness  -     Benefits Reviewed patient and family:;improve function;increase independence;increase strength;increase balance  -     Barriers to Rehab medically complex;cognitive status;impaired sensation   impaired proprioception LLE  -     Row Name 09/07/18 0925          Relationship/Environment    Lives With spouse  -[facility] Name 09/07/18 0925          Resource/Environmental Concerns    Current Living Arrangements home/apartment/condo  -[facility] Name 09/07/18 0925          Stairs Within Home, Primary    Stairs, Within Home, Primary would not need to use stairs to go upstairs  -     Number of Stairs, Within Home, Primary --   20 per nephew  -     Stair Railings, Within Home, Primary railings on both sides of stairs  -     Row Name 09/07/18 0925          Cognitive Assessment/Intervention- PT/OT    Affect/Mental Status (Cognitive) [facility]  -     Orientation Status (Cognition) oriented x 4  -     Follows Commands (Cognition) WFL  -[facility] Name 09/07/18 0925          Safety Issues, Functional Mobility    Safety Issues Affecting Function (Mobility) insight into deficits/self awareness  -     Impairments Affecting Function (Mobility) balance;postural/trunk control;sensation/sensory awareness   E coordination  -[facility] Name 09/07/18 0925          Bed Mobility Assessment/Treatment    Bed Mobility Assessment/Treatment supine-sit;sit-supine  -     Supine-Sit [place] (Bed Mobility) supervision  -     Sit-Supine [place] (Bed Mobility) supervision  -     Row Name 09/07/18 0925          Transfer Assessment/Treatment    Transfer Assessment/Treatment sit-stand transfer;stand-sit transfer  -     Sit-Stand [place] (Transfers) contact guard  -     Stand-Sit [place] (Transfers) contact guard  -     Row Name  09/07/18 0925          Gait/Stairs Assessment/Training    [place] Level (Gait) contact guard;minimum assist (75% patient effort)   occasional LOB to the L  -LH     Distance in Feet (Gait) 90   balance assessed w/n the 90 feet  -[facility] Name 09/07/18 0925          General ROM    GENERAL ROM COMMENTS WFL  -[facility] 09/07/18 0925          MMT (Manual Muscle Testing)    General MMT Comments 5/5, slight weakness L hip flexion 4/5   difficulty holding balance w/ dynamic sitting  -LH     Row Name 09/07/18 0925          Motor Assessment/Intervention    Additional Documentation Balance (Group);Gross Motor Coordination (Group);Balance Interventions (Group)  -[facility] Name 09/07/18 0925          Gross Motor Coordination    Gross Motor Impairments balance;coordination;postural control;sensation   proprioception impaired LLE  -     Gross Motor Skill, Impairments Detail impaired L heel to shin  -LH     Row Name 09/07/18 0925          Balance    Balance dynamic balance activity;static standing balance;standing balance activity  -LH     Row Name 09/07/18 0925          Static Standing Balance    Level of [place] (Supported Standing, Static Balance) minimal assist, 75% patient effort  -     Time Able to Maintain Position (Supported Standing, Static Balance) less than 15 seconds  -LH     Row Name 09/07/18 0925          Standing Balance Activity    Activities Performed (Standing, Balance Training) feet together in stance  -     Support Needed for Balance (Standing, Balance Training) minimal external support for balance, 75% patient effort;balances without upper extremity support  -LH     Row Name 09/07/18 0925          Dynamic Balance Activity    Therapeutic Training Performed (Dynamic Balance) ambulate backward;side stepping  -LH     Row Name 09/07/18 0925          Sensory Assessment/Intervention    Sensory General Assessment light touch sensation deficits identified;proprioception deficits identified   LLE   -     Row Name 09/07/18 0925          Proprioception Assessment    Left Lower Extremity: Proprioception Assessment severe impairment, less than 50% correct responses  -[facility] Name 09/07/18 0925          Pain Assessment    Additional Documentation Pain Scale: Numbers Pre/Post-Treatment (Group)  -[facility] Name 09/07/18 0925          Pain Scale: Numbers Pre/Post-Treatment    Pain Scale: Numbers, Pretreatment 0/10 - no pain  -     Pain Intervention(s) Medication (See MAR);Repositioned;Ambulation/increased activity  -[facility] Name 09/07/18 0925          Plan of Care Review    Plan of Care Reviewed With patient;family  -[facility] Name 09/07/18 0925          Physical Therapy Clinical Impression    Date of Referral to PT 09/06/18  -     PT Diagnosis (PT Clinical Impression) impaired mobility  -     Patient/Family Goals Statement (PT Clinical Impression) return home  -     Criteria for Skilled Interventions Met (PT Clinical Impression) yes;treatment indicated  -     Rehab Potential (PT Clinical Summary) good, to achieve stated therapy goals  -     Predicted Duration of Therapy (PT) until dc  -     Care Plan Review (PT) evaluation/treatment results reviewed;risks/benefits reviewed;patient/other agree to care plan  -     Care Plan Review, Other Participant (PT Clinical Impression) family   nephew  -[facility] Name 09/07/18 0925          Physical Therapy Goals    Transfer Goal Selection (PT) transfer, PT goal 1  -     Gait Training Goal Selection (PT) gait training, PT goal 1  -     Balance Goal Selection (PT) balance, PT goal 1  -     Additional Documentation Balance Goal Selection (PT) (Row)  -[facility] Name 09/07/18 0925          Transfer Goal 1 (PT)    Activity/Assistive Device (Transfer Goal 1, PT) sit-to-stand/stand-to-sit;bed-to-chair/chair-to-bed  -     [place] Level/Cues Needed (Transfer Goal 1, PT) standby assist  -     Time Frame (Transfer Goal 1, PT) by discharge  -      Progress/Outcome (Transfer Goal 1, PT) goal ongoing  [facility]     Row Name 09/07/18 0925          Gait Training Goal 1 (PT)    Activity/Assistive Device (Gait Training Goal 1, PT) gait (walking locomotion);diminish gait deviation;improve balance and speed  -     [place] Level (Gait Training Goal 1, PT) standby assist  -     Distance (Gait Goal 1, PT) 200 feet  -     Time Frame (Gait Training Goal 1, PT) by discharge  -     Progress/Outcome (Gait Training Goal 1, PT) goal ongoing  [facility]     Row Name 09/07/18 0925          Balance Goal 1 (PT)    Activity/Assistive Device (Balance Goal 1, PT) standing, static;standing, dynamic  -     [place] Level/Cues Needed (Balance Goal 1, PT) standby assist  -     Time Frame (Balance Goal 1, PT) by discharge  -     Progress/Outcomes (Balance Goal 1, PT) goal [facility] Name 09/07/18 0925          Positioning and Restraints    Pre-Treatment Position in bed  -     Post Treatment Position bed  -     In Bed fowlers;call light within reach;encouraged to call for assist;with family/caregiver;side rails up x2;SCD pump applied  -[facility] Name 09/07/18 0925          Living Environment    Home Accessibility stairs within home;tub/shower is not walk in  -       User Key  (r) = Recorded By, (t) = Taken By, (c) = Cosigned By    Initials Name Provider Type     Jose Medrano, PT Physical Therapist          Physical Therapy Education     Title: PT OT SLP Therapies (Done)     Topic: Physical Therapy (Done)     Point: Mobility training (Done)    Learning Progress Summary     Learner Status Readiness Method Response Comment Documented by    Patient Done Acceptance E,JOSE MARTINEZ,VU benefits of PT and POC, call for assist OOB  09/07/18 1122    Family Done Acceptance E,D DU,VU benefits of PT and POC, call for assist OOB  09/07/18 1122          Point: Precautions (Done)    Learning Progress Summary     Learner Status Readiness Method Response Comment Documented by     Patient Done Acceptance E,D DU,VU benefits of PT and POC, call for assist OOB  09/07/18 1122    Family Done Acceptance E,D DU,VU benefits of PT and POC, call for assist OOB  09/07/18 1122                      User Key     Initials Effective Dates Name Provider Type Discipline     08/02/16 -  Jose Medrano, PT Physical Therapist PT                PT Recommendation and Plan  Anticipated Discharge Disposition (PT): inpatient rehabilitation facility, home with OP services (pending progress following surgery)  Planned Therapy Interventions (PT Eval): balance training, gait training, home exercise program, neuromuscular re-education, patient/family education, strengthening, transfer training  Therapy Frequency (PT Clinical Impression): 2 times/day  Outcome Summary/Treatment Plan (PT)  Anticipated Discharge Disposition (PT): inpatient rehabilitation facility, home with OP services (pending progress following surgery)  Plan of Care Reviewed With: patient, family (nephew)  Outcome Summary: PT IE complete.  Pt demonstrated impaired sensation, impaired coordination, and impaired propriception on his L side.  During ambulation, pt loses balance to the L requiring Min assist to recover.  Pt having difficulty w/ dynamic sitting balance, backwards gait, and static standing balance as well.  Mild weakness L hip flexion.  Pt to receive skilled PT to address functional mobility safety.  May benefit from acute rehab vs OP PT at [place] pending progress following surgery.  Thank you for referral.          Outcome Measures     Row Name 09/07/18 1000             How much help from another person do you currently need...    Turning from your back to your side while in flat bed without using bedrails? 4  -LH      Moving from lying on back to sitting on the side of a flat bed without bedrails? 4  -LH      Moving to and from a bed to a chair (including a wheelchair)? 3  -LH      Standing up from a chair using your arms (e.g., wheelchair,  bedside chair)? 3  -      Climbing 3-5 steps with a railing? 3  -      To walk in hospital room? 3  -      AM-PAC 6 Clicks Score 20  -         Functional Assessment    Outcome Measure Options AM-PAC 6 Clicks Basic Mobility (PT)  -        User Key  (r) = Recorded By, (t) = Taken By, (c) = Cosigned By    Initials Name Provider Type     Jose Medrano, PT Physical Therapist           Time Calculation:         PT Charges     Row Name 09/07/18 1129             Time Calculation    Start Time 0925  -      Stop Time 1020  -      Time Calculation (min) 55 min  -      PT Received On 09/07/18  -      PT Goal Re-Cert Due Date 09/17/18  -        User Key  (r) = Recorded By, (t) = Taken By, (c) = Cosigned By    Initials Name Provider Type     Jose Medrano, PT Physical Therapist        Therapy Suggested Charges     Code   Minutes Charges    None           Therapy Charges for Today     Code Description Service Date Service Provider Modifiers Qty    91862367800 HC PT MOBILITY CURRENT 9/7/2018 Jose Merdano, PT GP, CJ 1    76693711351 HC PT MOBILITY PROJECTED 9/7/2018 Jose Medrano, PT GP, CI 1    64797316615 HC PT EVAL MOD COMPLEXITY 4 9/7/2018 Jose Medrano, PT GP 1          PT G-Codes  Outcome Measure Options: AM-PAC 6 Clicks Basic Mobility (PT)  AM-PAC 6 Clicks Score: 20  Functional Limitation: Mobility: Walking and moving around  Mobility: Walking and Moving Around Current Status (): At least 20 percent but less than 40 percent impaired, limited or restricted  Mobility: Walking and Moving Around Goal Status (): At least 1 percent but less than 20 percent impaired, limited or restricted      Jose Medrano, PT  9/7/2018

## 2018-09-07 NOTE — PLAN OF CARE
Problem: Patient Care Overview  Goal: Plan of Care Review  Outcome: Ongoing (interventions implemented as appropriate)   09/07/18 1450   Coping/Psychosocial   Plan of Care Reviewed With patient   Plan of Care Review   Progress improving   OTHER   Outcome Summary VSS. Patient able to transfer well in room. Patient surgery planned for Monday. Patient on diabetic diet. Patient denied pain throughout shift. Renal US completed this shift.      Goal: Individualization and Mutuality  Outcome: Ongoing (interventions implemented as appropriate)    Goal: Discharge Needs Assessment  Outcome: Ongoing (interventions implemented as appropriate)    Goal: Interprofessional Rounds/Family Conf  Outcome: Ongoing (interventions implemented as appropriate)      Problem: Confusion, Acute (Adult)  Goal: Cognitive/Functional Impairments Minimized  Outcome: Ongoing (interventions implemented as appropriate)    Goal: Safety  Outcome: Ongoing (interventions implemented as appropriate)      Problem: Fall Risk (Adult)  Goal: Absence of Fall  Outcome: Ongoing (interventions implemented as appropriate)

## 2018-09-08 NOTE — THERAPY TREATMENT NOTE
Acute Care - Physical Therapy Treatment Note  [facility]     Patient Name: Dwight Cobian  : 1940  MRN: [id number]  Today's Date: 2018  Onset of Illness/Injury or Date of Surgery: 18  Date of Referral to PT: 18  Referring Physician: Dr. Ervin    Admit Date: 2018    Visit Dx:    ICD-10-CM ICD-9-CM   1. Impaired mobility Z74.09 799.89   2. 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)   • Unsteady gait   • Forgetfulness   • Neoplasm, brain (CMS/HCC)       Therapy Treatment          Rehabilitation Treatment Summary     Row Name 18             Treatment Time/Intention    Discipline physical therapy assistant  -RILEY      Document Type therapy note (daily note)  -RILEY      Subjective Information no complaints  -RILEY      Mode of Treatment physical therapy  -RILEY      Patient Effort excellent  -JB2      Existing Precautions/Restrictions fall  -RILEY      Recorded by [RILEY] Brennan Nolasco, PTA 18  [JB2] Brennan Nolasco, PTA 18      Row Name 18             Bed Mobility Assessment/Treatment    Comment (Bed Mobility) seated in straight back chair  -RILEY      Recorded by [RILEY] Brennan Nolasco, PTA 18      Row Name 18             Sit-Stand Transfer    Sit-Stand [place] (Transfers) contact guard  -RILEY      Recorded by [RILEY] Brennan Nolasco, PTA 18      Row Name 18             Gait/Stairs Assessment/Training    [place] Level (Gait) verbal cues  -RILEY      Distance in Feet (Gait) 180'  -RILEY      Left Sided Gait Deviations heel strike decreased;leans left  -RILEY      Comment (Gait/Stairs) heel strike did improve with cueing and exaggerated heel strikes  -RILEY      Recorded by [RILEY] Brennan Nolasco, PTA 18      Row Name 18             Balance    Balance dynamic  standing balance;dynamic balance activity  -RILEY      Recorded by [RILEY] Brennan Nolasco, PTA 09/08/18 0936      Row Name 09/08/18 0905             Dynamic Standing Balance    Level of [place], Reaches Outside Midline (Standing, Dynamic Balance) contact guard assist;minimal assist, 75% patient effort  -RILEY      Level of [place], Resists Mild Perturbations (Standing, Dynamic Balance) minimal assist, 75% patient effort  -RILEY      Time Able to Maintain Position, Resists Mild Perturbations (Standing, Dynamic Balance) --   alternating isometrics in standing in all planes  -RILEY      Recorded by [RILEY] Brennan Nolasco, PTA 09/08/18 0936      Row Name 09/08/18 0905             Dynamic Balance Activity    Therapeutic Training Performed (Dynamic Balance) backward walking;side stepping;360 degree turns   SLS with opposite leg swing throughs with heel strike  -RILEY      Support Needed for Balance (Dynamic Balance Training) uses at least one upper extremity for support  -RILEY      Comment (Dynamic Balance Training) He does continue to have decreased balance during turns  -RILEY      Recorded by [RILEY] Brennan Nolasco, [facility] 09/08/18 0936      Row Name 09/08/18 0905             Positioning and Restraints    Pre-Treatment Position sitting in chair/recliner  -RILEY      Post Treatment Position chair  -RILEY      In Chair sitting;call light within reach;encouraged to call for assist;with family/caregiver  -RILEY      Recorded by [RILEY] Brennan Nolasco, [facility] 09/08/18 0936      Row Name 09/08/18 0905             Pain Scale: Numbers Pre/Post-Treatment    Pain Scale: Numbers, Pretreatment 0/10 - no pain  -RILEY      Pain Scale: Numbers, Post-Treatment 0/10 - no pain  -JB2      Recorded by [RILEY] Brennan Nolasco, PTA 09/08/18 0907  [JB2] Brennan Nolasco, [facility] 09/08/18 0936        User Key  (r) = Recorded By, (t) = Taken By, (c) = Cosigned By    Initials Name Effective Dates Discipline    RILEY Brennan Nolasco, [facility] 08/02/16 -  PT                      Physical Therapy Education     Title: PT OT SLP Therapies (Done)     Topic: Physical Therapy (Done)     Point: Mobility training (Done)    Learning Progress Summary     Learner Status Readiness Method Response Comment Documented by    Patient Done Eager E SUE,NR Educated patient on improving heel strike  09/08/18 0936     Done Acceptance E,D DU,VU benefits of PT and POC, call for assist OOB  09/07/18 1122    Family Done Acceptance E,D DU,VU benefits of PT and POC, call for assist OOB  09/07/18 1122          Point: Precautions (Done)    Learning Progress Summary     Learner Status Readiness Method Response Comment Documented by    Patient Done Acceptance E,D DU,VU benefits of PT and POC, call for assist OOB  09/07/18 1122    Family Done Acceptance E,D DU,VU benefits of PT and POC, [facility] 09/07[facility]                      User Key     Initials Effective Dates Name Provider Type Discipline     08/02/16 -  Jose Medrano, PT Physical Therapist PT     08/02/16 -  Brennan Nolasco, PTA Physical Therapy Assistant PT                    PT Recommendation and Plan     Plan of Care Reviewed With: patient  Progress: improving  Outcome Summary: Patient is doing well with physical therapy, but he continues to be at a risk for falls.  He has decreased left heel strike, occasional path deviations to the left, and decreased safety during turns.  He ambulated 180' with CGA and completed sanding balance static and dynamic with CGA/min A.          Outcome Measures     Row Name 09/08/18 0905 09/07/18 1000 09/07/18 0948       How much help from another person do you currently need...    Turning from your back to your side while in flat bed without using bedrails? 4  -RILEY 4  -LH  --    Moving from lying on back to sitting on the side of a flat bed without bedrails? 4  -RILEY 4  -LH  --    Moving to and from a bed to a chair (including a wheelchair)? 3  -RILEY 3  -LH  --    Standing up from a chair using your  arms (e.g., wheelchair, bedside chair)? 3  -RILEY 3  -LH  --    Climbing 3-5 steps with a railing? 3  -RILEY 3  -LH  --    To walk in hospital room? 3  -RILEY 3  -LH  --    AM-PAC 6 Clicks Score 20  -RILEY 20  -LH  --       How much help from another is currently needed...    Putting on and taking off regular lower body clothing?  --  -- 2  -MM (r) SL (t) MM (c)    Bathing (including washing, rinsing, and drying)  --  -- 2  -MM (r) SL (t) MM (c)    Toileting (which includes using toilet bed pan or urinal)  --  -- 3  -MM (r) SL (t) MM (c)    Putting on and taking off regular upper body clothing  --  -- 3  -MM (r) SL (t) MM (c)    Taking care of personal grooming (such as brushing teeth)  --  -- 4  -MM (r) SL (t) MM (c)    Eating meals  --  -- 4  -MM (r) SL (t) MM (c)    Score  --  -- 18  -MM (r) SL (t)       Functional Assessment    Outcome Measure Options AM-PAC 6 Clicks Basic Mobility (PT)  - AM-PAC 6 Clicks Basic Mobility (PT)  - AM-PAC 6 Clicks Daily Activity (OT)  -MM (r) SL (t) MM (c)      User Key  (r) = Recorded By, (t) = Taken By, (c) = Cosigned By    Initials Name Provider Type     Jose Medrano, PT Physical Therapist    Brennan aVrgas, PTA Physical Therapy Assistant    MM Wilfrid Hall, OTR/L Occupational Therapist    SL Kirstie Hernandez, OT Student OT Student           Time Calculation:         PT Charges     Row Name 09/08/18 0939             Time Calculation    Start Time 0905  -RILEY      Stop Time 0930  -RILEY      Time Calculation (min) 25 min  -RILEY      PT Received On 09/08/18  -      PT Goal Re-Cert Due Date 09/17/18  -         Time Calculation- PT    Total Timed Code Minutes- PT 25 minute(s)  -RILEY         Timed Charges    01944 - Gait Training Minutes  10  -RILEY      87924 - PT Therapeutic Activity Minutes 15  -RILEY        User Key  (r) = Recorded By, (t) = Taken By, (c) = Cosigned By    Initials Name Provider Type    Brennan Vargas, PTA Physical Therapy Assistant        Therapy Suggested  Charges     Code   Minutes Charges    93888 (CPT®) Hc Pt Neuromusc Re Education Ea 15 Min      42960 (CPT®) Hc Pt Ther Proc Ea 15 Min      33664 (CPT®) Hc Gait Training Ea 15 Min 10 1    03513 (CPT®) Hc Pt Therapeutic Act Ea 15 Min 15 1    84847 (CPT®) Hc Pt Manual Therapy Ea 15 Min      08607 (CPT®) Hc Pt Iontophoresis Ea 15 Min      80144 (CPT®) Hc Pt Elec Stim Ea-Per 15 Min      04748 (CPT®) Hc Pt Ultrasound Ea 15 Min      30028 (CPT®) Hc Pt Self Care/Mgmt/Train Ea 15 Min      87096 (CPT®) Hc Pt Prosthetic (S) Train Initial Encounter, Each 15 Min      69466 (CPT®) Hc Pt Orthotic(S)/Prosthetic(S) Encounter, Each 15 Min      01208 (CPT®) Hc Orthotic(S) Mgmt/Train Initial Encounter, Each 15min      Total  25 2        Therapy Charges for Today     Code Description Service Date Service Provider Modifiers Qty    87042385740 HC GAIT TRAINING EA 15 MIN 9/8/2018 Brennan Nolasco, PTA GP, KX 1    13600908472 HC PT THERAPEUTIC ACT EA 15 MIN 9/8/2018 Brennan Nolasco, PTA GP, KX 1          PT G-Codes  Outcome Measure Options: AM-PAC 6 Clicks Basic Mobility (PT)  AM-PAC 6 Clicks Score: 20  Score: 18  Functional Limitation: Mobility: Walking and moving around  Mobility: Walking and Moving Around Current Status (): At least 20 percent but less than 40 percent impaired, limited or restricted  Mobility: Walking and Moving Around Goal Status (): At least 1 percent but less than 20 percent impaired, limited or restricted    Brennan Nolasco PTA  9/8/2018

## 2018-09-08 NOTE — PLAN OF CARE
Problem: Patient Care Overview  Goal: Plan of Care Review  Outcome: Ongoing (interventions implemented as appropriate)   09/08/18 0306   Coping/Psychosocial   Plan of Care Reviewed With patient   Plan of Care Review   Progress no change   OTHER   Outcome Summary PT more alert and steady tonight. Sister at bedside, tries to help patient with underwear, sheets, etc.        Problem: Confusion, Acute (Adult)  Goal: Cognitive/Functional Impairments Minimized  Outcome: Ongoing (interventions implemented as appropriate)      Problem: Fall Risk (Adult)  Goal: Absence of Fall  Outcome: Ongoing (interventions implemented as appropriate)

## 2018-09-08 NOTE — PROGRESS NOTES
[facility] Medicine Services  INPATIENT PROGRESS NOTE    Patient Name: Dwight Cobian  Date of Admission: 9/6/2018  Today's Date: 09/08/18  Length of Stay: 2  Primary Care Physician: Saurav Lima MD    Subjective   Chief Complaint: f/u   HPI   Sugar improved to 121 at bedtime.  His sugar this morning is 298.  He is dong well and has no new complaints         Review of Systems     All pertinent negatives and positives are as above. All other systems have been reviewed and are negative unless otherwise stated.     Objective    Temp:  [97.5 °F (36.4 °C)-98 °F (36.7 °C)] 97.6 °F (36.4 °C)  Heart Rate:  [] 87  Resp:  [16-18] 18  BP: (119-150)/(45-68) 134/65  Physical Exam  Constitutional: He is oriented to person, place, and time. He appears well-developed and well-nourished.   Laying comfortably in bed  HENT:   Head: Normocephalic and atraumatic.   Eyes: Pupils are equal, round,Conjunctivae and EOM are normal.   Neck: Neck supple. No JVD present. No thyromegaly present.   Cardiovascular: Normal rate, regular rhythm, normal heart sounds and intact distal pulses.  Exam reveals no gallop and no friction rub.    No murmur heard.  Pulmonary/Chest: Effort normal and breath sounds normal. No respiratory distress. He has no wheezes. He has no rales. He exhibits no tenderness.   Abdominal: Soft. Bowel sounds are normal. He exhibits no distension. There is no tenderness. There is no rebound and no guarding.   Musculoskeletal: Normal range of motion. He exhibits no edema, tenderness or deformity.    Neurological: He is alert and oriented to person, place, and time.No cranial nerve deficit. He exhibits normal muscle tone.     Skin: Skin is warm and dry. No rash noted.   Psychiatric: he is calm .   Nursing note and vitals reviewed.       Results Review:  I have reviewed the labs, radiology results, and diagnostic studies.    Laboratory Data:     Results from last 7 days  Lab Units  09/06/18  1308   WBC 10*3/mm3 6.32   HEMOGLOBIN g/dL 13.2*   HEMATOCRIT % 38.9*   PLATELETS 10*3/mm3 191          Results from last 7 days  Lab Units 09/07/18  1730 09/06/18  1308   SODIUM mmol/L 132* 135   POTASSIUM mmol/L 4.9 4.6   CHLORIDE mmol/L 98 98   CO2 mmol/L 21.0* 27.0   BUN mg/dL 25* 22*   CREATININE mg/dL 1.13 1.01   CALCIUM mg/dL 8.6 9.1   BILIRUBIN mg/dL  --  0.5   ALK PHOS U/L  --  94   ALT (SGPT) U/L  --  29   AST (SGOT) U/L  --  27   GLUCOSE mg/dL 678*  678* 469*       Culture Data:        Radiology Data:   Imaging Results (last 24 hours)     Procedure Component Value Units Date/Time    US Renal Bilateral [[id number]] Collected:  09/07/18 1423     Updated:  09/07/18 1427    Narrative:       RENAL ULTRASOUND COMPLETE 9/7/2018 1:16 PM CDT     REASON FOR EXAM: renal cyst; Z74.09-Other reduced mobility; Z74.09-Other  reduced mobility       COMPARISON: None       TECHNIQUE: Multiple longitudinal and transverse realtime sonographic  images of the kidneys and urinary bladder are obtained.      FINDINGS:      RIGHT KIDNEY: 11.9 cm. Normal in size, shape, contour and position. No  solid or cystic masses. The central echo complex is compact with no  evidence for hydronephrosis. No nephrolithiasis or abnormal perinephric  fluid collections . No hydroureter.      LEFT KIDNEY: 11.7 cm. Normal in size, shape, contour and position. There  is a large cyst involving the upper pole of left kidney measuring 8.9 x  5.9 x 6.9 cm in size.. The central echo complex is compact with no  evidence for hydronephrosis. No nephrolithiasis or abnormal perinephric  fluid collections . No hydroureter.      PELVIS: The bladder is mildly distended with anechoic urine and  demonstrates no significant wall thickening or internal echogenicities.  There is no surrounding ascites.        Impression:       1. Large cortical cyst involving the upper pole of the left kidney.  Otherwise normal renal ultrasound..        This report was  finalized on 09/07/2018 14:24 by Dr. Domingo Richey MD.          I have reviewed the patient's current medications.     Assessment/Plan     Hospital Problem List     Neoplasm, brain (CMS/HCC)        1.  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  Type II diabetes, with hyperglycemia exacerbated by steroid  3.  Gait disturbance  4.  Acute cognitive impairment  5.  Large cortical cyst involving the upper pole of the left kidney  6.  5 mm nodule in the left upper lobe -outpatient f/u           Cont on Levemir  Check Aic level today  Consider adding premeal insulin on top of ssi and Levemir  Prn d50 for hypoglycemia  Discussed with Dr. Ervin -plan for OR on Monday; currently on Decadron 4 mg Q8 H   Other plan of care per orders and per primary service      Gil Rojas MD   09/08/18   10:33 AM

## 2018-09-08 NOTE — PROGRESS NOTES
Dwight Mango  78 y.o.      Chief complaint:   Brain tumor    Subjective  Feels better, feels left side stronger, no headache    Temp:  [97.5 °F (36.4 °C)-98 °F (36.7 °C)] 97.6 °F (36.4 °C)  Heart Rate:  [] 87  Resp:  [16-18] 18  BP: (119-150)/(45-68) 134/65      Objective    Neurologic Exam  Mental Status   Oriented to person, place, and time.   Attention: normal.   Speech: speech is normal   Level of consciousness: alert     Cranial Nerves   Cranial nerves II through XII intact.      Motor Exam   Muscle bulk: normal  Overall muscle tone: normal  Right arm pronator drift: absent  Left arm pronator drift: absent     Strength   Strength 5/5 except as noted. Some dysmetria and difficulty with rapid alternating movements the left hand      Sensory Exam   Light touch normal.   Pinprick normal.      Gait, Coordination, and Reflexes      Gait  Gait: (Unsteady gait)     Coordination   Romberg: positive  Finger to nose coordination: abnormal  Tandem walking coordination: abnormal     Tremor   Resting tremor: absent  Intention tremor: absent  Action tremor: absent     Reflexes   Right biceps: 1+  Left biceps: 1+  Right patellar: 1+  Left patellar: 1+  Right Goetz: absent  Left Goetz: absent  Right ankle clonus: absent  Left ankle clonus: absent  Active Problems:    Neoplasm, brain (CMS/HCC)      Lab Results (last 24 hours)     Procedure Component Value Units Date/Time    POC Glucose Once [[id number]]  (Abnormal) Collected:  09/08/18 0726    Specimen:  Blood Updated:  09/08/18 0738     Glucose 298 (H) mg/dL      Comment: : [id number] Dale CampbellMeter ID: [id number]       POC Glucose Once [[id number]]  (Normal) Collected:  09/07/18 2337    Specimen:  Blood Updated:  09/08/18 0007     Glucose 121 mg/dL      Comment: : [id number] Pura VeraMeter ID: [id number]       POC Glucose Once [[id number]]  (Abnormal) Collected:  09/07/18 2014    Specimen:  Blood Updated:  09/07/18 2026     Glucose 409 (H) mg/dL       Comment: : [id number] Pura VeraMeter ID: [id number]       Basic Metabolic Panel [[id number]]  (Abnormal) Collected:  09/07/18 1730    Specimen:  Blood Updated:  09/07/18 1910     Glucose 678 (C) mg/dL      BUN 25 (H) mg/dL      Creatinine 1.13 mg/dL      Sodium 132 (L) mmol/L      Potassium 4.9 mmol/L      Chloride 98 mmol/L      CO2 21.0 (L) mmol/L      Calcium 8.6 mg/dL      eGFR Non African Amer 63 mL/min/1.73      BUN/Creatinine Ratio 22.1     Anion Gap 13.0 mmol/L     Narrative:       The MDRD GFR formula is only valid for adults with stable renal function between ages 18 and 70.    Glucose, Random [[id number]]  (Abnormal) Collected:  09/07/18 1730    Specimen:  Blood Updated:  09/07/18 1809     Glucose 678 (C) mg/dL     POC Glucose Once [[id number]]  (Abnormal) Collected:  09/07/18 1047    Specimen:  Blood Updated:  09/07/18 1059     Glucose 301 (H) mg/dL      Comment: : [id number] Reasor NathanMeter ID: [id number]                 Plan:   Hyperglycemia-per hospitalist  CT and US do not suggest a primary malignancy.  Plan OR Monday for craniotomy    Mika Ervin MD

## 2018-09-08 NOTE — PLAN OF CARE
Problem: Patient Care Overview  Goal: Plan of Care Review  Outcome: Ongoing (interventions implemented as appropriate)   09/08/18 3922   Coping/Psychosocial   Plan of Care Reviewed With patient   Plan of Care Review   Progress improving   OTHER   Outcome Summary VSS. Patient able to ambulate well in halls. Patient still impulsive, but less so compared to previous shift. Patient surgery planned for Monday. Patient has no complaints of pain.      Goal: Individualization and Mutuality  Outcome: Ongoing (interventions implemented as appropriate)    Goal: Discharge Needs Assessment  Outcome: Ongoing (interventions implemented as appropriate)    Goal: Interprofessional Rounds/Family Conf  Outcome: Ongoing (interventions implemented as appropriate)      Problem: Confusion, Acute (Adult)  Goal: Cognitive/Functional Impairments Minimized  Outcome: Ongoing (interventions implemented as appropriate)    Goal: Safety  Outcome: Ongoing (interventions implemented as appropriate)      Problem: Fall Risk (Adult)  Goal: Absence of Fall  Outcome: Ongoing (interventions implemented as appropriate)

## 2018-09-08 NOTE — THERAPY TREATMENT NOTE
Acute Care - Physical Therapy Treatment Note  [facility]     Patient Name: Dwight Cobian  : 1940  MRN: [id number]  Today's Date: 2018  Onset of Illness/Injury or Date of Surgery: 18  Date of Referral to PT: 18  Referring Physician: Dr. Ervin    Admit Date: 2018    Visit Dx:    ICD-10-CM ICD-9-CM   1. Impaired mobility Z74.09 799.89   2. Impaired mobility and ADLs Z74. 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)   • Unsteady gait   • Forgetfulness   • Neoplasm, brain (CMS/HCC)       Therapy Treatment          Rehabilitation Treatment Summary     Row Name 18 1328 18 0905          Treatment Time/Intention    Discipline physical therapy assistant  -RILEY physical therapy assistant  -RILEY     Document Type therapy note (daily note)  -RILEY therapy note (daily note)  -RILEY     Subjective Information no complaints  -JB2 no complaints  -RILEY     Mode of Treatment physical therapy  -JB2 physical therapy  -RILEY     Patient Effort excellent  -JB2 excellent  -JB2     Existing Precautions/Restrictions fall  -JB2 fall  -RILEY     Recorded by [RILEY] Brennan Nolasco, PTA 18 1343  [JB2] Brennan Nolasco, PTA 18 1352 [RILEY] Brennan Nolasco, PTA 18 0907  [JB2] Brennan Nolasco, PTA 18 0936     Row Name 18 1328 18 0905          Bed Mobility Assessment/Treatment    Supine-Sit [place] (Bed Mobility) supervision  -RILEY  --     Sit-Supine [place] (Bed Mobility) supervision  -RILEY  --     Comment (Bed Mobility)  -- seated in straight back chair  -RILEY     Recorded by [RILEY] Brennan Nolasco, PTA 18 1352 [RILEY] Brennan Nolasco, PTA 18 0936     Row Name 18 1328             Transfer Assessment/Treatment    Comment (Transfers) he did have slight posterior lean upon standing  -RILEY      Recorded by [RILEY] Brennan Nolasco, PTA 18 1352      Row Name  09/08/18 1328 09/08/18 0905          Sit-Stand Transfer    Sit-Stand [place] (Transfers) contact guard  -RILEY contact guard  -RILEY     Recorded by [RILEY] Brennan Nolasco, PTA 09/08/18 1352 [RILEY] Brennan Nolasco, PTA 09/08/18 0936     Row Name 09/08/18 1328 09/08/18 0905          Gait/Stairs Assessment/Training    [place] Level (Gait) verbal cues  -RILEY verbal cues  -RILEY     Distance in Feet (Gait) 180'  -RILEY 180'  -RILEY     Left Sided Gait Deviations heel strike decreased;leans left  -RILEY heel strike decreased;leans left  -RILEY     Comment (Gait/Stairs) he did have improved heel strike this afternoon compared to this morning  -RILEY heel strike did improve with cueing and exaggerated heel strikes  -RILEY     Recorded by [RILEY] Brennan Nolasco, PTA 09/08/18 1352 [RILEY] Brennan Nolasco, PTA 09/08/18 0936     Row Name 09/08/18 1328             Therapeutic Exercise    Lower Extremity (Therapeutic Exercise) marching while standing  -RILEY      Lower Extremity Range of Motion (Therapeutic Exercise) hip abduction/adduction, bilateral   heel/toe raises  -RILEY      Exercise Type (Therapeutic Exercise) AROM (active range of motion)  -RILEY      Position (Therapeutic Exercise) standing  -RILEY      Sets/Reps (Therapeutic Exercise) 10-15  -RILEY      Recorded by [RILEY] Brennan Nolasco, PTA 09/08/18 1352      Row Name 09/08/18 0905             Balance    Balance dynamic standing balance;dynamic balance activity  -RILEY      Recorded by [RILEY] Brennan Nolasco, PTA 09/08/18 0936      Row Name 09/08/18 1328 09/08/18 0905          Dynamic Standing Balance    Level of [place], Reaches Outside Midline (Standing, Dynamic Balance) contact guard assist;minimal assist, 75% patient effort  -RILEY contact guard assist;minimal assist, 75% patient effort  -RILEY     Level of [place], Resists Mild Perturbations (Standing, Dynamic Balance)  -- minimal assist, 75% patient effort  -RILEY     Time Able to Maintain Position, Resists Mild Perturbations (Standing, Dynamic  Balance)  -- --   alternating isometrics in standing in all planes  -RILEY     Recorded by [RILEY] Brennan Nolasco, PTA 09/08/18 1352 [RILEY] Brennan Nolasco, PTA 09/08/18 0936     Row Name 09/08/18 1328 09/08/18 0905          Dynamic Balance Activity    Therapeutic Training Performed (Dynamic Balance) backward walking;side stepping  -RILEY backward walking;side stepping;360 degree turns   SLS with opposite leg swing throughs with heel strike  -RILEY     Support Needed for Balance (Dynamic Balance Training) balances without upper extremity support  -RILEY uses at least one upper extremity for support  -RILEY     Comment (Dynamic Balance Training) most difficulty with backwards walking  -RILEY He does continue to have decreased balance during turns  -RILEY     Recorded by [RILEY] Brennan Nolasco, PTA 09/08/18 1352 [RILEY] Brennan Nolasco, PTA 09/08/18 0936     Row Name 09/08/18 1328 09/08/18 0905          Positioning and Restraints    Pre-Treatment Position in bed  -RILEY sitting in chair/recliner  -RILEY     Post Treatment Position bed  -RILEY chair  -RILEY     In Bed fowlers;call light within reach;encouraged to call for assist;exit alarm on;notified nsg  -RILEY  --     In Chair  -- sitting;call light within reach;encouraged to call for assist;with family/caregiver  -RILEY     Recorded by [RILEY] Brennan Nolasco, PTA 09/08/18 1352 [RILEY] Brennan Nolasco, PTA 09/08/18 0936     Row Name 09/08/18 1328 09/08/18 0905          Pain Scale: Numbers Pre/Post-Treatment    Pain Scale: Numbers, Pretreatment 0/10 - no pain  -RILEY 0/10 - no pain  -RILEY     Pain Scale: Numbers, Post-Treatment 0/10 - no pain  -RILEY 0/10 - no pain  -JB2     Recorded by [RILEY] Brennan Nolasco, PTA 09/08/18 1352 [RILEY] Brennan Nolasco, PTA 09/08/18 0907  [JB2] Brennan Nolasco, PTA 09/08/18 0936       User Key  (r) = Recorded By, (t) = Taken By, (c) = Cosigned By    Initials Name Effective Dates Discipline    RILEY Brennan Nolasco, PTA 08/02/16 -  PT                     Physical Therapy Education      Title: PT OT SLP Therapies (Done)     Topic: Physical Therapy (Done)     Point: Mobility training (Done)    Learning Progress Summary     Learner Status Readiness Method Response Comment Documented by    Patient Done Eager E SUE,NR Educated patient on improving heel strike  09/08/18 0936     Done Acceptance E,D DU,VU benefits of PT and POC, call for assist OOB  09/07/18 1122    Family Done Acceptance E,D DU,VU benefits of PT and POC, call for assist OOB  09/07/18 1122          Point: Precautions (Done)    Learning Progress Summary     Learner Status Readiness Method Response Comment Documented by    Patient Done Acceptance E,D DU,VU benefits of PT and POC, call for assist OOB  09/07/18 1122    Family Done Acceptance E,D DU,VU benefits of PT and POC, [facility] 09/07/18 1122                      User Key     Initials Effective Dates Name Provider Type Discipline     08/02/16 -  Jose Medrano, PT Physical Therapist PT     08/02/16 -  Brennan Nolasco, PTA Physical Therapy Assistant PT                    PT Recommendation and Plan     Plan of Care Reviewed With: patient  Progress: improving  Outcome Summary: Patient is doing well with physical therapy, but he continues to be at a risk for falls.  He has decreased left heel strike, occasional path deviations to the left, and decreased safety during turns.  He ambulated 180' with CGA and completed sanding balance static and dynamic with CGA/min A.          Outcome Measures     Row Name 09/08/18 0905 09/07/18 1000 09/07/18 0948       How much help from another person do you currently need...    Turning from your back to your side while in flat bed without using bedrails? 4  -RILEY 4  -LH  --    Moving from lying on back to sitting on the side of a flat bed without bedrails? 4  -RILEY 4  -LH  --    Moving to and from a bed to a chair (including a wheelchair)? 3  -RILEY 3  -LH  --    Standing up from a chair using your arms (e.g., wheelchair, bedside  chair)? 3  -RILEY 3  -LH  --    Climbing 3-5 steps with a railing? 3  -RILEY 3  -LH  --    To walk in hospital room? 3  -RILEY 3  -LH  --    AM-PAC 6 Clicks Score 20  -RILEY 20  -LH  --       How much help from another is currently needed...    Putting on and taking off regular lower body clothing?  --  -- 2  -MM (r) SL (t) MM (c)    Bathing (including washing, rinsing, and drying)  --  -- 2  -MM (r) SL (t) MM (c)    Toileting (which includes using toilet bed pan or urinal)  --  -- 3  -MM (r) SL (t) MM (c)    Putting on and taking off regular upper body clothing  --  -- 3  -MM (r) SL (t) MM (c)    Taking care of personal grooming (such as brushing teeth)  --  -- 4  -MM (r) SL (t) MM (c)    Eating meals  --  -- 4  -MM (r) SL (t) MM (c)    Score  --  -- 18  -MM (r) SL (t)       Functional Assessment    Outcome Measure Options AM-PAC 6 Clicks Basic Mobility (PT)  - AM-PAC 6 Clicks Basic Mobility (PT)  - AM-PAC 6 Clicks Daily Activity (OT)  -MM (r) SL (t) MM (c)      User Key  (r) = Recorded By, (t) = Taken By, (c) = Cosigned By    Initials Name Provider Type     Jose Medrano, PT Physical Therapist    Brennan Vargas, PTA Physical Therapy Assistant    MM Wilfrid Hall, OTR/L Occupational Therapist    Kirstie Burnett, OT Student OT Student           Time Calculation:         PT Charges     Row Name 09/08/18 1353 09/08/18 0939          Time Calculation    Start Time 1328  -RILEY 0905  -RILEY     Stop Time 1348  -RILEY 0930  -RILEY     Time Calculation (min) 20 min  -RILEY 25 min  -RILEY     PT Received On 09/08/18  -RILEY 09/08/18  -RILEY     PT Goal Re-Cert Due Date 09/17/18  -RILEY 09/17/18  -RILEY        Time Calculation- PT    Total Timed Code Minutes- PT 20 minute(s)  -RILEY 25 minute(s)  -RIELY        Timed Charges    51259 - PT Therapeutic Exercise Minutes 5  -RILEY  --     65590 - Gait Training Minutes  15  -RILEY 10  -RILEY     43685 - PT Therapeutic Activity Minutes  -- 15  -RILEY       User Key  (r) = Recorded By, (t) = Taken By, (c) = Cosigned By     Initials Name Provider Type    RILEY Brennan Nolasco PTA Physical Therapy Assistant        Therapy Suggested Charges     Code   Minutes Charges    66724 (CPT®) Hc Pt Neuromusc Re Education Ea 15 Min      50199 (CPT®) Hc Pt Ther Proc Ea 15 Min 5     70836 (CPT®) Hc Gait Training Ea 15 Min 15 1    11222 (CPT®) Hc Pt Therapeutic Act Ea 15 Min      73125 (CPT®) Hc Pt Manual Therapy Ea 15 Min      37446 (CPT®) Hc Pt Iontophoresis Ea 15 Min      90237 (CPT®) Hc Pt Elec Stim Ea-Per 15 Min      98670 (CPT®) Hc Pt Ultrasound Ea 15 Min      40407 (CPT®) Hc Pt Self Care/Mgmt/Train Ea 15 Min      39974 (CPT®) Hc Pt Prosthetic (S) Train Initial Encounter, Each 15 Min      34168 (CPT®) Hc Pt Orthotic(S)/Prosthetic(S) Encounter, Each 15 Min      45989 (CPT®) Hc Orthotic(S) Mgmt/Train Initial Encounter, Each 15min      Total  20 1        Therapy Charges for Today     Code Description Service Date Service Provider Modifiers Qty    00303043595 HC GAIT TRAINING EA 15 MIN 9/8/2018 Brennan Nolasco, PTA GP, KX 1    20197819689 HC PT THERAPEUTIC ACT EA 15 MIN 9/8/2018 Brennan Nolasco, PTA GP, KX 1    [id number] HC GAIT TRAINING EA 15 MIN 9/8/2018 Brennan Nolasco, PTA GP, KX 1          PT G-Codes  Outcome Measure Options: AM-PAC 6 Clicks Basic Mobility (PT)  AM-PAC 6 Clicks Score: 20  Score: 18  Functional Limitation: Mobility: Walking and moving around  Mobility: Walking and Moving Around Current Status (): At least 20 percent but less than 40 percent impaired, limited or restricted  Mobility: Walking and Moving Around Goal Status (): At least 1 percent but less than 20 percent impaired, limited or restricted    Brennan Nolasco PTA  9/8/2018

## 2018-09-08 NOTE — PLAN OF CARE
Problem: Patient Care Overview  Goal: Plan of Care Review  Outcome: Ongoing (interventions implemented as appropriate)   09/08/18 0905   Coping/Psychosocial   Plan of Care Reviewed With patient   Plan of Care Review   Progress improving   OTHER   Outcome Summary Patient is doing well with physical therapy, but he continues to be at a risk for falls due to decreased left side coordination. He has decreased left heel strike, occasional path deviations to the left, and decreased safety during turns. He ambulated 180' with CGA and completed standing balance static and dynamic with CGA/min A.  We will continue to work to challenge his balance and midline orientation.

## 2018-09-09 PROBLEM — C71.9 MALIGNANT NEOPLASM OF BRAIN (HCC): Status: ACTIVE | Noted: 2018-01-01

## 2018-09-09 NOTE — PLAN OF CARE
Problem: Patient Care Overview  Goal: Plan of Care Review  Outcome: Ongoing (interventions implemented as appropriate)   09/09/18 3757   Coping/Psychosocial   Plan of Care Reviewed With patient   Plan of Care Review   Progress improving   OTHER   Outcome Summary Patient denies pain, VSS, had BM today. BG improved from previous shifts.        Problem: Confusion, Acute (Adult)  Goal: Cognitive/Functional Impairments Minimized  Outcome: Ongoing (interventions implemented as appropriate)    Goal: Safety  Outcome: Ongoing (interventions implemented as appropriate)      Problem: Fall Risk (Adult)  Goal: Absence of Fall  Outcome: Ongoing (interventions implemented as appropriate)

## 2018-09-09 NOTE — PROGRESS NOTES
Dwightkarlos Cobian  78 y.o.      Chief complaint:   Brain tumor    Subjective  No complaints, no events, doing well.    Temp:  [97.7 °F (36.5 °C)-98.4 °F (36.9 °C)] 98.1 °F (36.7 °C)  Heart Rate:  [73-91] 87  Resp:  [18-20] 20  BP: (124-154)/(53-74) 124/53      Objective    Neurologic Exam     Mental Status   Oriented to person, place, and time.   Attention: normal.   Speech: speech is normal   Level of consciousness: alert  Knowledge: good.     Cranial Nerves   Cranial nerves II through XII intact.     Motor Exam   Muscle bulk: normal  Overall muscle tone: normal    Strength   Strength 5/5 throughout.     Sensory Exam   Light touch normal.   Pinprick normal.     Gait, Coordination, and Reflexes     Gait  Gait: normal      Active Problems:    Neoplasm, brain (CMS/HCC)      Lab Results (last 24 hours)     Procedure Component Value Units Date/Time    POC Glucose Once [[id number]]  (Abnormal) Collected:  09/09/18 0747    Specimen:  Blood Updated:  09/09/18 0759     Glucose 165 (H) mg/dL      Comment: : [id number] Cierra Keller ID: [id number]       POC Glucose Once [[id number]]  (Abnormal) Collected:  09/09/18 0428    Specimen:  Blood Updated:  09/09/18 0440     Glucose 163 (H) mg/dL      Comment: : [id number] Mann ThucMeter ID: [id number]       POC Glucose Once [[id number]]  (Normal) Collected:  09/09/18 0050    Specimen:  Blood Updated:  09/09/18 0102     Glucose 106 mg/dL      Comment: : [id number] Mann ThucMeter ID: [id number]       POC Glucose Once [[id number]]  (Abnormal) Collected:  09/08/18 2055    Specimen:  Blood Updated:  09/08/18 2106     Glucose 412 (H) mg/dL      Comment: : [id number] Mann ThucMeter ID: [id number]       POC Glucose Once [[id number]]  (Abnormal) Collected:  09/08/18 1713    Specimen:  Blood Updated:  09/08/18 1725     Glucose 464 (C) mg/dL      Comment: : [id number] Dale NathanMeter ID: [id number]       POC Glucose Once [[id number]]  (Abnormal) Collected:  09/08/18  1657    Specimen:  Blood Updated:  09/08/18 1709     Glucose 438 (H) mg/dL      Comment: : [id number] Reasor NathanMeter ID: [id number]       POC Glucose Once [[id number]]  (Abnormal) Collected:  09/08/18 1152    Specimen:  Blood Updated:  09/08/18 1203     Glucose 389 (H) mg/dL      Comment: : [id number] Reasor NathanMeter ID: [id number]       POC Glucose Once [[id number]]  (Abnormal) Collected:  09/08/18 1142    Specimen:  Blood Updated:  09/08/18 1153     Glucose 411 (H) mg/dL      Comment: : [id number] Reasor NathanMeter ID: [id number]       Hemoglobin A1c [[id number]] Collected:  09/08/18 1032    Specimen:  Blood Updated:  09/08/18 1149     Hemoglobin A1C 10.9 %     Narrative:       Less than 6.0           Non-Diabetic Range  6.0-7.0                 ADA Therapeutic Target  Greater than 7.0        Action Suggested              Plan:   Brain tumor.  Plan our tomorrow.  Hyperglycemia.  Improve control overall.    Mika Ervin MD

## 2018-09-09 NOTE — THERAPY TREATMENT NOTE
Acute Care - Physical Therapy Treatment Note  [facility]     Patient Name: Dwight Cobian  : 1940  MRN: [id number]  Today's Date: 2018  Onset of Illness/Injury or Date of Surgery: 18  Date of Referral to PT: 18  Referring Physician: Dr. Ervin    Admit Date: 2018    Visit Dx:    ICD-10-CM ICD-9-CM   1. Impaired mobility Z74.09 799.89   2. Impaired mobility and ADLs Z74. 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)   • Unsteady gait   • Forgetfulness   • Neoplasm, brain (CMS/HCC)       Therapy Treatment          Rehabilitation Treatment Summary     Row Name 18 1315 18 08          Treatment Time/Intention    Discipline physical therapy assistant  -RILEY physical therapy assistant  -RILEY     Document Type therapy note (daily note)  -RILEY therapy note (daily note)  -RILEY     Subjective Information no complaints  -JB2 no complaints  -JB2     Mode of Treatment physical therapy  -JB2 physical therapy  -JB2     Patient Effort excellent  -JB2 excellent  -JB2     Existing Precautions/Restrictions fall  -JB2 fall  -JB2     Recorded by [RILEY] Brennan Nolasco, PTA 18 1315  [JB2] Brennan Nolasco, PTA 18 1348 [RILEY] Brennan Nolasco, PTA 18 0813  [JB2] Brennan Nolasco, PTA 18 0844     Row Name 18 1315 18 0813          Bed Mobility Assessment/Treatment    Supine-Sit [place] (Bed Mobility) supervision  -RILEY supervision  -RILEY     Sit-Supine [place] (Bed Mobility) supervision  -RILEY supervision  -RILEY     Recorded by [RILEY] Brennan Nolasco, PTA 18 1348 [RILEY] Brennan Nolasco, PTA 18 0844     Row Name 18 1315 18 0813          Transfer Assessment/Treatment    Transfer Assessment/Treatment toilet transfer  -RILEY toilet transfer  -RILEY     Recorded by [RILEY] Brennan Nolasco, PTA 18 1348 [RILEY] Brennan Nolasco, PTA 18 0844      Row Name 09/09/18 1315 09/09/18 0813          Sit-Stand Transfer    Sit-Stand [place] (Transfers) supervision  -RILEY contact guard  -RILEY     Recorded by [RILEY] Brennan Nolasco, \[facility] [place] [facility]\"" 09/09/18 1348 [RILEY] Brennan Nolasco, \[facility] [place] [facility]\"" 09/09/18 0844     Row Name 09/09/18 1315 09/09/18 0813          Toilet Transfer    [place] Level (Toilet Transfer) supervision  -RILEY independent  -RILEY     Recorded by [RILEY] Brennan Nolasco, PTA 09/09/18 1348 [RILEY] Brennan Nolasco, PTA 09/09/18 0844     Row Name 09/09/18 1315 09/09/18 0813          Gait/Stairs Assessment/Training    [place] Level (Gait) verbal cues  -RILEY verbal cues  -RILEY     Distance in Feet (Gait) 180  -RILEY 180  -RILEY     Left Sided Gait Deviations heel strike decreased;leans left  -RILEY heel strike decreased;leans left  -RILEY     Recorded by [RILEY] Brennan Nolasco, \[facility] [place] [facility]\"" 09/09/18 1348 [RILEY] Brennan Nolasco, \[facility] [place] [facility]\"" 09/09/18 0844     Row Name 09/09/18 0813             Therapeutic Exercise    Lower Extremity (Therapeutic Exercise) marching while seated;LAQ (long arc quad), bilateral  -RILEY      Lower Extremity Range of Motion (Therapeutic Exercise) hip abduction/adduction, bilateral  -RILEY      Exercise Type (Therapeutic Exercise) AROM (active range of motion)  -RILEY      Position (Therapeutic Exercise) seated  -RILEY      Sets/Reps (Therapeutic Exercise) 20 reps  -RILEY      Recorded by [RILEY] Brennan Nolasco, PTA 09/09/18 0844      Row Name 09/09/18 131 09/09/18 0813          Dynamic Standing Balance    Level of [place], Reaches Outside Midline (Standing, Dynamic Balance) minimal assist, 75% patient effort  -RILEY minimal assist, 75% patient effort  -RILEY     Comment, Resists Mild Perturbations (Sitting, Dynamic Balance) standing alternating foot taps on glove box 3X10 reps.  He needed one HA and min A for balance  -RILEY standing alternating foot taps on glove box 3X10 reps.  He needed one HA and min A for balance  -RILEY     Recorded by [RILEY] Brennan Nolasco, PTA 09/09/18 1348 [RILEY] Gaurav  Brennan LÓPEZ, PTA 09/09/18 0844     Row Name 09/09/18 1315 09/09/18 0813          Dynamic Balance Activity    Therapeutic Training Performed (Dynamic Balance) other (see comments)   stepping up/over and around boxes on floor  -RILEY other (see comments)   stepping up/over and around boxes on floor  -RILEY     Support Needed for Balance (Dynamic Balance Training) balances without upper extremity support;minimal external support for balance, 75% patient effort  -RILEY balances without upper extremity support;minimal external support for balance, 75% patient effort  -RILEY     Comment (Dynamic Balance Training) He did demonstrate overshooting with figure eights around boxes, decreased balance with turns  -RILEY He did demonstrate overshooting with figure eigts around boxes  -RILEY     Recorded by [RILEY] Brennan Nolasco, PTA 09/09/18 1348 [RILEY] Brennan Nolasco, [facility] 09/09/18 0844     Row Name 09/09/18 1315 09/09/18 0813          Positioning and Restraints    Pre-Treatment Position in bed  -RILEY in bed  -RILEY     Post Treatment Position chair  -RILEY chair  -RILEY     In Chair sitting;call light within reach;encouraged to call for assist;with family/caregiver  -RILEY sitting;call light within reach;with family/caregiver  -RILEY     Recorded by [RILEY] Brennan Nolasco, PTA 09/09/18 1348 [RILEY] Brennan Nolasco, [facility] 09/09/18 0844     Row Name 09/09/18 1315 09/09/18 0813          Pain Scale: Numbers Pre/Post-Treatment    Pain Scale: Numbers, Pretreatment 0/10 - no pain  -RILEY 0/10 - no pain  -RILEY     Pain Scale: Numbers, Post-Treatment 0/10 - no pain  -RILEY 0/10 - no pain  -RILEY     Recorded by [RILEY] Brennan Nolasco, PTA 09/09/18 1348 [RILEY] Brennan Nolasco, [facility] 09/09/18 0844       User Key  (r) = Recorded By, (t) = Taken By, (c) = Cosigned By    Initials Name Effective Dates Discipline    Brennan Vargas, [facility] 08/02/16 -  PT                     Physical Therapy Education     Title: PT OT SLP Therapies (Active)     Topic: Physical Therapy (Active)     Point:  Mobility training (Active)    Learning Progress Summary     Learner Status Readiness Method Response Comment Documented by    Patient Active Acceptance E NR Educated on coordination and balance activities  09/09/18 0845     Done Eager E VU,NR Educated patient on improving heel strike  09/08/18 0936     Done Acceptance E,D DU,VU benefits of PT and POC, call for assist OOB  09/07/18 1122    Family Done Acceptance E,D DU,VU benefits of PT and POC, call for assist OOB  09/07/18 1122          Point: Precautions (Done)    Learning Progress Summary     Learner Status Readiness Method Response Comment Documented by    Patient Done Acceptance E,D DU,VU benefits of PT and POC, call for assist OOB  09/07/18 1122    Family Done Acceptance E,D DU,VU benefits of PT and POC, call for assist OOB  09/07/18 1122                      User Key     Initials Effective Dates Name Provider Type Discipline     08/02/16 -  Jose Medrano, PT Physical Therapist PT     08/02/16 -  Brennan Nolasco, PTA Physical Therapy Assistant PT                    PT Recommendation and Plan     Plan of Care Reviewed With: patient  Progress: improving  Outcome Summary: Patient continues to have decreased left heel strike with gait and occasional left lateral drift.  He ambulated 180' with CGA.  He continues to have decreased left lower leg coordination and decreased ssafety with turns.  We will continue to work with patient on left lower leg coordination, balance, and giat mechanics.          Outcome Measures     Row Name 09/09/18 0813 09/08/18 0905 09/07/18 1000       How much help from another person do you currently need...    Turning from your back to your side while in flat bed without using bedrails? 4  -RILEY 4  -RILEY 4  -LH    Moving from lying on back to sitting on the side of a flat bed without bedrails? 4  -RILEY 4  -RILEY 4  -LH    Moving to and from a bed to a chair (including a wheelchair)? 3  -RILEY 3  -RILEY 3  -LH    Standing up from a chair  using your arms (e.g., wheelchair, bedside chair)? 3  -RILEY 3  -RILEY 3  -LH    Climbing 3-5 steps with a railing? 3  -RILEY 3  -RILEY 3  -LH    To walk in hospital room? 3  -RILEY 3  -RILEY 3  -LH    AM-PAC 6 Clicks Score 20  -RILEY 20  -RILEY 20  -       Functional Assessment    Outcome Measure Options AM-PAC 6 Clicks Basic Mobility (PT)  -RILEY AM-PAC 6 Clicks Basic Mobility (PT)  - AM-PAC 6 Clicks Basic Mobility (PT)  -    Row Name 09/07/18 0948             How much help from another is currently needed...    Putting on and taking off regular lower body clothing? 2  -MM (r) SL (t) MM (c)      Bathing (including washing, rinsing, and drying) 2  -MM (r) SL (t) MM (c)      Toileting (which includes using toilet bed pan or urinal) 3  -MM (r) SL (t) MM (c)      Putting on and taking off regular upper body clothing 3  -MM (r) SL (t) MM (c)      Taking care of personal grooming (such as brushing teeth) 4  -MM (r) SL (t) MM (c)      Eating meals 4  -MM (r) SL (t) MM (c)      Score 18  -MM (r) SL (t)         Functional Assessment    Outcome Measure Options AM-PAC 6 Clicks Daily Activity (OT)  -MM (r) SL (t) MM (c)        User Key  (r) = Recorded By, (t) = Taken By, (c) = Cosigned By    Initials Name Provider Type     Jose Medrano, PT Physical Therapist    RILEY Brennan Nolasco, PTA Physical Therapy Assistant    Wilfrid Rodriguez, OTR/L Occupational Therapist    SL Kirstie Hernandez, OT Student OT Student           Time Calculation:         PT Charges     Row Name 09/09/18 1349 09/09/18 0847          Time Calculation    Start Time 1315  -RILEY 0813  -RILEY     Stop Time 1334  -RILEY 0840  -RILEY     Time Calculation (min) 19 min  -RILEY 27 min  -RILEY     PT Received On 09/09/18  -RILEY 09/09/18  -RILEY     PT Goal Re-Cert Due Date 09/17/18  -RILEY 09/17/18  -RILEY        Time Calculation- PT    Total Timed Code Minutes- PT 19 minute(s)  -RILEY 27 minute(s)  -RILEY        Timed Charges    74588 - PT Therapeutic Exercise Minutes  -- 17  -RILEY     87004 - Gait Training  Minutes  12  -RILEY 10  -RILEY     38388 - PT Therapeutic Activity Minutes 7  -RILEY  --       User Key  (r) = Recorded By, (t) = Taken By, (c) = Cosigned By    Initials Name Provider Type    Brennan Vargas PTA Physical Therapy Assistant        Therapy Suggested Charges     Code   Minutes Charges    92871 (CPT®) Hc Pt Neuromusc Re Education Ea 15 Min      67309 (CPT®) Hc Pt Ther Proc Ea 15 Min      42187 (CPT®) Hc Gait Training Ea 15 Min 12 1    58277 (CPT®) Hc Pt Therapeutic Act Ea 15 Min 7     57143 (CPT®) Hc Pt Manual Therapy Ea 15 Min      40175 (CPT®) Hc Pt Iontophoresis Ea 15 Min      45203 (CPT®) Hc Pt Elec Stim Ea-Per 15 Min      82908 (CPT®) Hc Pt Ultrasound Ea 15 Min      17548 (CPT®) Hc Pt Self Care/Mgmt/Train Ea 15 Min      44716 (CPT®) Hc Pt Prosthetic (S) Train Initial Encounter, Each 15 Min      22649 (CPT®) Hc Pt Orthotic(S)/Prosthetic(S) Encounter, Each 15 Min      90864 (CPT®) Hc Orthotic(S) Mgmt/Train Initial Encounter, Each 15min      Total  19 1        Therapy Charges for Today     Code Description Service Date Service Provider Modifiers Qty    65041068605 HC GAIT TRAINING EA 15 MIN 9/8/2018 Brennan Nolasco, PTA GP, KX 1    69846062262 HC PT THERAPEUTIC ACT EA 15 MIN 9/8/2018 Brennan Nolasco, PTA GP, KX 1    11858335028 HC GAIT TRAINING EA 15 MIN 9/8/2018 Brennan Nolasco, PTA GP, KX 1    11431060111 HC PT THER PROC EA 15 MIN 9/9/2018 Brennan Nolasco, PTA GP, KX 1    38425538614 HC GAIT TRAINING EA 15 MIN 9/9/2018 Brennan Nolasco, PTA GP, KX 1    44450255240 HC GAIT TRAINING EA 15 MIN 9/9/2018 Brennan Nolasco, PTA GP, KX 1          PT G-Codes  Outcome Measure Options: AM-PAC 6 Clicks Basic Mobility (PT)  AM-PAC 6 Clicks Score: 20  Score: 18  Functional Limitation: Mobility: Walking and moving around  Mobility: Walking and Moving Around Current Status (): At least 20 percent but less than 40 percent impaired, limited or restricted  Mobility: Walking and Moving Around Goal Status  (): At least 1 percent but less than 20 percent impaired, limited or restricted    Brennan Nolasco, PTA  9/9/2018

## 2018-09-09 NOTE — PROGRESS NOTES
[facility] Medicine Services  INPATIENT PROGRESS NOTE    Patient Name: Dwight Cobian  Date of Admission: 9/6/2018  Today's Date: 09/09/18  Length of Stay: 3  Primary Care Physician: Saurav Lima MD    Subjective   Chief Complaint: brain tumor    HPI   Currently sitting up in bed.  S[employer] member called him and is currently letting him listen to a song they are singing this morning.  Patient is tearful and appreciative of this.  He currently denies any pain or headaches.  Currently alert and oriented.      Review of Systems   Constitutional: Positive for activity change and fatigue. Negative for appetite change, chills and fever.   HENT: Negative for hearing loss, nosebleeds, tinnitus and trouble swallowing.    Eyes: Negative for visual disturbance.   Respiratory: Negative for cough, chest tightness, shortness of breath and wheezing.    Cardiovascular: Negative for chest pain, palpitations and leg swelling.   Gastrointestinal: Negative for abdominal distention, abdominal pain, blood in stool, constipation, diarrhea, nausea and vomiting.   Endocrine: Negative for cold intolerance, heat intolerance, polydipsia, polyphagia and polyuria.   Genitourinary: Negative for decreased urine volume, difficulty urinating, dysuria, flank pain, frequency and hematuria.   Musculoskeletal: Negative for arthralgias, joint swelling and myalgias.   Skin: Negative for rash.   Allergic/Immunologic: Negative for immunocompromised state.   Neurological: Positive for weakness. Negative for dizziness, syncope, light-headedness and headaches.   Hematological: Negative for adenopathy. Does not bruise/bleed easily.   Psychiatric/Behavioral: Positive for confusion. Negative for sleep disturbance. The patient is not nervous/anxious.       All pertinent negatives and positives are as above. All other systems have been reviewed and are negative unless otherwise stated.     Objective    Temp:  [97.7 °F (36.5  °C)-98.4 °F (36.9 °C)] 98.1 °F (36.7 °C)  Heart Rate:  [73-91] 87  Resp:  [18-20] 20  BP: (124-154)/(53-74) 124/53     Physical Exam   Constitutional: He is oriented to person, place, and time. He appears well-developed and well-nourished.   Sitting up in bed. NAD.   HENT:   Head: Normocephalic and atraumatic.   Eyes: Pupils are equal, round, and reactive to light. Conjunctivae and EOM are normal.   Neck: Neck supple. No JVD present. No thyromegaly present.   Cardiovascular: Normal rate, regular rhythm, normal heart sounds and intact distal pulses.  Exam reveals no gallop and no friction rub.    No murmur heard.  Pulmonary/Chest: Effort normal and breath sounds normal. No respiratory distress. He has no wheezes. He has no rales. He exhibits no tenderness.   Abdominal: Soft. Bowel sounds are normal. He exhibits no distension. There is no tenderness. There is no rebound and no guarding.   Musculoskeletal: Normal range of motion. He exhibits no edema, tenderness or deformity.   Lymphadenopathy:     He has no cervical adenopathy.   Neurological: He is alert and oriented to person, place, and time. He displays normal reflexes. No cranial nerve deficit. He exhibits normal muscle tone.   Skin: Skin is warm and dry. No rash noted.   Psychiatric: He has a normal mood and affect. His behavior is normal. Judgment and thought content normal.   Nursing note and vitals reviewed.    Results Review:  I have reviewed the labs, radiology results, and diagnostic studies.    Laboratory Data:     Results from last 7 days  Lab Units 09/06/18  1308   WBC 10*3/mm3 6.32   HEMOGLOBIN g/dL 13.2*   HEMATOCRIT % 38.9*   PLATELETS 10*3/mm3 191       Results from last 7 days  Lab Units 09/07/18  1730 09/06/18  1308   SODIUM mmol/L 132* 135   POTASSIUM mmol/L 4.9 4.6   CHLORIDE mmol/L 98 98   CO2 mmol/L 21.0* 27.0   BUN mg/dL 25* 22*   CREATININE mg/dL 1.13 1.01   CALCIUM mg/dL 8.6 9.1   BILIRUBIN mg/dL  --  0.5   ALK PHOS U/L  --  94   ALT (SGPT)  U/L  --  29   AST (SGOT) U/L  --  27   GLUCOSE mg/dL 678*  678* 469*     Radiology Data:   Imaging Results (last 24 hours)     ** No results found for the last 24 hours. **        I have reviewed the patient's current medications.     Assessment/Plan     Assessment:   1.  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  Type II diabetes, with hyperglycemia  3.  Gait disturbance  4.  Acute cognitive impairment  5.  Large cortical cyst involving the upper pole of the left kidney  6.  5 mm nodule in the left upper lobe     Plan:   1.  Continue Levemir 20 units nightly   2.  Glucoses: 106, 163, 165, 273.  3.  Keppra and Decadron continues  4.  Craniotomy tomorrow, NPO after midnight  5.  CBC and BMP in AM  6.  Continue high dose SSI   7.  Metformin 500 mg BID  8.  IV fluids continued    Discharge Planning: Per attending.     LAURA Mcpherson   09/09/18   9:43 AM     I personally evaluated and examined the patient in conjunction with LAURA Moreno and agree with the assessment, treatment plan, and disposition of the patient as recorded by her. My history, exam, and further recommendations are:   No new complaints  I informed about my role in his care  Normal respiratory effort  No distress  Vitals:    09/09/18 1200   BP: 127/59   Pulse: 72   Resp: 20   Temp: 98.4 °F (36.9 °C)   SpO2: 95%   blood sugar better control with regimen of basal insulin sliding scale and premeal insulin.  Anticipating surgery tomorrow  CPT  Disposition per primary service      Gil Rojas MD  09/09/18  2:49 PM

## 2018-09-09 NOTE — THERAPY TREATMENT NOTE
Acute Care - Physical Therapy Treatment Note  [facility]     Patient Name: Dwight Cobian  : 1940  MRN: [id number]  Today's Date: 2018  Onset of Illness/Injury or Date of Surgery: 18  Date of Referral to PT: 18  Referring Physician: Dr. Ervin    Admit Date: 2018    Visit Dx:    ICD-10-CM ICD-9-CM   1. Impaired mobility Z74.09 799.89   2. Impaired mobility and ADLs Z74. 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)   • Unsteady gait   • Forgetfulness   • Neoplasm, brain (CMS/HCC)       Therapy Treatment          Rehabilitation Treatment Summary     Row Name 18             Treatment Time/Intention    Discipline physical therapy assistant  -RILEY      Document Type therapy note (daily note)  -RILEY      Subjective Information no complaints  -JB2      Mode of Treatment physical therapy  -JB2      Patient Effort excellent  -JB2      Existing Precautions/Restrictions fall  -JB2      Recorded by [RILEY] Brennan Nolasco, PTA 18  [JB2] Brennan Nolasco, PTA 18      Row Name 18             Bed Mobility Assessment/Treatment    Supine-Sit [place] (Bed Mobility) supervision  -RILEY      Sit-Supine [place] (Bed Mobility) supervision  -RILEY      Recorded by [RILEY] Brennan Nolasco, PTA 18      Row Name 18             Transfer Assessment/Treatment    Transfer Assessment/Treatment toilet transfer  -RILEY      Recorded by [RILEY] Brennan Nolasco, PTA 18      Row Name 18             Sit-Stand Transfer    Sit-Stand [place] (Transfers) contact guard  -RILEY      Recorded by [RILEY] Brennan Nolasco, PTA 18      Row Name 18             Toilet Transfer    [place] Level (Toilet Transfer) independent  -RILEY      Recorded by [RILEY] Brennan Nolasco, PTA 18      Row Name 18              Gait/Stairs Assessment/Training    [place] Level (Gait) verbal cues  -RILEY      Distance in Feet (Gait) 180  -RILEY      Left Sided Gait Deviations heel strike decreased;leans left  -RILEY      Recorded by [RILEY] Brennan Nolasco, PTA 09/09/18 0844      Row Name 09/09/18 0813             Therapeutic Exercise    Lower Extremity (Therapeutic Exercise) marching while seated;LAQ (long arc quad), bilateral  -RILEY      Lower Extremity Range of Motion (Therapeutic Exercise) hip abduction/adduction, bilateral  -RILEY      Exercise Type (Therapeutic Exercise) AROM (active range of motion)  -RILEY      Position (Therapeutic Exercise) seated  -RILEY      Sets/Reps (Therapeutic Exercise) 20 reps  -RILEY      Recorded by [RILEY] Brennan Nolasco, PTA 09/09/18 0844      Row Name 09/09/18 0813             Dynamic Standing Balance    Level of Six[place], Reaches Outside Midline (Standing, Dynamic Balance) minimal assist, 75% patient effort  -RILEY      Comment, Resists Mild Perturbations (Sitting, Dynamic Balance) standing alternating foot taps on glove box 3X10 reps.  He needed one HA and min A for balance  -RILEY      Recorded by [RILEY] Brennan Nolasco, PTA 09/09/18 0844      Row Name 09/09/18 0813             Dynamic Balance Activity    Therapeutic Training Performed (Dynamic Balance) other (see comments)   stepping up/over and around boxes on floor  -RILEY      Support Needed for Balance (Dynamic Balance Training) balances without upper extremity support;minimal external support for balance, 75% patient effort  -RILEY      Comment (Dynamic Balance Training) He did demonstrate overshooting with figure eigts around boxes  -RILEY      Recorded by [RILEY] Brennan Nolasco, PTA 09/09/18 0844      Row Name 09/09/18 0813             Positioning and Restraints    Pre-Treatment Position in bed  -RILEY      Post Treatment Position chair  -RILEY      In Chair sitting;call light within reach;with family/caregiver  -RILEY      Recorded by [RILEY] Brennan Nolasco, PTA 09/09/18  0844      Row Name 09/09/18 0813             Pain Scale: Numbers Pre/Post-Treatment    Pain Scale: Numbers, Pretreatment 0/10 - no pain  -RILEY      Pain Scale: Numbers, Post-Treatment 0/10 - no pain  -RILEY      Recorded by [RILEY] Brennan Nolasco PTA 09/09/18 0844        User Key  (r) = Recorded By, (t) = Taken By, (c) = Cosigned By    Initials Name Effective Dates Discipline    Brennan Vargas, BELKIS 08/02/16 -  PT                     Physical Therapy Education     Title: PT OT SLP Therapies (Active)     Topic: Physical Therapy (Active)     Point: Mobility training (Active)    Learning Progress Summary     Learner Status Readiness Method Response Comment Documented by    Patient Active Acceptance E NR Educated on coordination and balance activities  09/09/18 0845     Done Eager E VU,NR Educated patient on improving heel strike  09/08/18 0936     Done Acceptance E,D DU,VU benefits of PT and POC, call for assist OOB  09/07/18 1122    Family Done Acceptance E,D DU,VU benefits of PT and POC, call for assist OOB  09/07/18 1122          Point: Precautions (Done)    Learning Progress Summary     Learner Status Readiness Method Response Comment Documented by    Patient Done Acceptance E,D DU,VU benefits of PT and POC, call for assist OOB  09/07/18 1122    Family Done Acceptance E,D DU,VU benefits of PT and POC, call for assist OOB  09/07/18 1122                      User Key     Initials Effective Dates Name Provider Type Discipline     08/02/16 -  Jose Medrano, PT Physical Therapist PT     08/02/16 -  Brennan Nolasco, [facility] Physical Therapy Assistant PT                    PT Recommendation and Plan     Plan of Care Reviewed With: patient  Progress: improving  Outcome Summary: Patient continues to have decreased left heel strike with gait and occasional left lateral drift.  He ambulated 180' with CGA.  He continues to have decreased left lower leg coordination and decreased ssafety with turns.  We will  continue to work with patient on left lower leg coordination, balance, and giat mechanics.          Outcome Measures     Row Name 09/09/18 0813 09/08/18 0905 09/07/18 1000       How much help from another person do you currently need...    Turning from your back to your side while in flat bed without using bedrails? 4  -RILEY 4  -RILEY 4  -LH    Moving from lying on back to sitting on the side of a flat bed without bedrails? 4  -RILEY 4  -RILEY 4  -LH    Moving to and from a bed to a chair (including a wheelchair)? 3  -RILEY 3  -RILEY 3  -LH    Standing up from a chair using your arms (e.g., wheelchair, bedside chair)? 3  -RILEY 3  -RILEY 3  -LH    Climbing 3-5 steps with a railing? 3  -RILEY 3  -RILEY 3  -LH    To walk in hospital room? 3  -RILEY 3  -RILEY 3  -LH    AM-PAC 6 Clicks Score 20  -RILEY 20  -RILEY 20  -LH       Functional Assessment    Outcome Measure Options AM-PAC 6 Clicks Basic Mobility (PT)  -RILEY AM-PAC 6 Clicks Basic Mobility (PT)  -RILEY AM-PAC 6 Clicks Basic Mobility (PT)  -    Row Name 09/07/18 0948             How much help from another is currently needed...    Putting on and taking off regular lower body clothing? 2  -MM (r) SL (t) MM (c)      Bathing (including washing, rinsing, and drying) 2  -MM (r) SL (t) MM (c)      Toileting (which includes using toilet bed pan or urinal) 3  -MM (r) SL (t) MM (c)      Putting on and taking off regular upper body clothing 3  -MM (r) SL (t) MM (c)      Taking care of personal grooming (such as brushing teeth) 4  -MM (r) SL (t) MM (c)      Eating meals 4  -MM (r) SL (t) MM (c)      Score 18  -MM (r) SL (t)         Functional Assessment    Outcome Measure Options AM-PAC 6 Clicks Daily Activity (OT)  -MM (r) SL (t) MM (c)        User Key  (r) = Recorded By, (t) = Taken By, (c) = Cosigned By    Initials Name Provider Type     Jose Medrano, PT Physical Therapist    RILEY Nolasco, [place] P, PTA Physical Therapy Assistant    Wilfrid Rodriguez, OTR/L Occupational Therapist    SL Kirstie Hernandez, OT  Student OT Student           Time Calculation:         PT Charges     Row Name 09/09/18 0847             Time Calculation    Start Time 0813  -RILEY      Stop Time 0840  -RILEY      Time Calculation (min) 27 min  -RILEY      PT Received On 09/09/18  -RILEY      PT Goal Re-Cert Due Date 09/17/18  -RILEY         Time Calculation- PT    Total Timed Code Minutes- PT 27 minute(s)  -RILEY         Timed Charges    81739 - PT Therapeutic Exercise Minutes 17  -RILEY      88656 - Gait Training Minutes  10  -RILEY        User Key  (r) = Recorded By, (t) = Taken By, (c) = Cosigned By    Initials Name Provider Type    Brennan Vargas, PTA Physical Therapy Assistant        Therapy Suggested Charges     Code   Minutes Charges    86485 (CPT®) Hc Pt Neuromusc Re Education Ea 15 Min      40164 (CPT®) Hc Pt Ther Proc Ea 15 Min 17 1    48874 (CPT®) Hc Gait Training Ea 15 Min 10 1    09430 (CPT®) Hc Pt Therapeutic Act Ea 15 Min      41829 (CPT®) Hc Pt Manual Therapy Ea 15 Min      08452 (CPT®) Hc Pt Iontophoresis Ea 15 Min      13577 (CPT®) Hc Pt Elec Stim Ea-Per 15 Min      72045 (CPT®) Hc Pt Ultrasound Ea 15 Min      81988 (CPT®) Hc Pt Self Care/Mgmt/Train Ea 15 Min      38542 (CPT®) Hc Pt Prosthetic (S) Train Initial Encounter, Each 15 Min      94367 (CPT®) Hc Pt Orthotic(S)/Prosthetic(S) Encounter, Each 15 Min      14528 (CPT®) Hc Orthotic(S) Mgmt/Train Initial Encounter, Each 15min      Total  27 2        Therapy Charges for Today     Code Description Service Date Service Provider Modifiers Qty    98417201899 HC GAIT TRAINING EA 15 MIN 9/8/2018 Brennan Nolasco, PTA GP, KX 1    96470170994 HC PT THERAPEUTIC ACT EA 15 MIN 9/8/2018 Brennan Nolasco, PTA GP, KX 1    49789884809 HC GAIT TRAINING EA 15 MIN 9/8/2018 Brennan Nolasco, PTA GP, KX 1    13930875560 HC PT THER PROC EA 15 MIN 9/9/2018 Brennan Nolasco, PTA GP, KX 1    60455277699 HC GAIT TRAINING EA 15 MIN 9/9/2018 Brennan Nolasco, PTA GP, KX 1          PT G-Codes  Outcome Measure Options:  AM-PAC 6 Clicks Basic Mobility (PT)  AM-PAC 6 Clicks Score: 20  Score: 18  Functional Limitation: Mobility: Walking and moving around  Mobility: Walking and Moving Around Current Status (): At least 20 percent but less than 40 percent impaired, limited or restricted  Mobility: Walking and Moving Around Goal Status (): At least 1 percent but less than 20 percent impaired, limited or restricted    Brennan Nolasco, PTA  9/9/2018

## 2018-09-10 NOTE — PLAN OF CARE
Problem: Patient Care Overview  Goal: Plan of Care Review  Outcome: Ongoing (interventions implemented as appropriate)   09/10/18 0401   Coping/Psychosocial   Plan of Care Reviewed With patient   Plan of Care Review   Progress no change   OTHER   Outcome Summary no c/o pain. no neuro changes A&O x4 but forgetful at times. surgery scheduled for today. VSS, Safety maintained.       Problem: Confusion, Acute (Adult)  Goal: Cognitive/Functional Impairments Minimized  Outcome: Ongoing (interventions implemented as appropriate)      Problem: Fall Risk (Adult)  Goal: Absence of Fall  Outcome: Ongoing (interventions implemented as appropriate)

## 2018-09-10 NOTE — ANESTHESIA POSTPROCEDURE EVALUATION
"Patient: Dwight Cobian    Procedure Summary     Date:  09/10/18 Room / Location:   [facility] 07 /  [facility]    Anesthesia Start:  1333 Anesthesia Stop:  1617    Procedure:  CRANIOTOMY FOR TUMOR RIGHT (Right Head) Diagnosis:       Malignant neoplasm of brain (CMS/HCC)      (Malignant neoplasm of brain (CMS/HCC) [C71.9])    Surgeon:  Mika Ervin MD Provider:  Edgar Galeano CRNA    Anesthesia Type:  general ASA Status:  3          Anesthesia Type: general  Last vitals  BP   132/62 (09/10/18 1645)   Temp   98 °F (36.7 °C) (09/10/18 1611)   Pulse   78 (09/10/18 1645)   Resp   19 (09/10/18 1645)     SpO2   94 % (09/10/18 1645)     Post Anesthesia Care and Evaluation    Patient location during evaluation: PACU  Patient participation: complete - patient participated  Level of consciousness: awake and alert  Pain management: adequate  Airway patency: patent  Anesthetic complications: No anesthetic complications  PONV Status: none  Cardiovascular status: acceptable and hemodynamically stable  Respiratory status: acceptable  Hydration status: acceptable    Comments: Blood pressure 132/62, pulse 78, temperature 98 °F (36.7 °C), temperature source Temporal Artery , resp. rate 19, height 182.9 cm (72\"), weight 79.5 kg (175 lb 3.2 oz), SpO2 94 %.    Patient discharged from PACU based upon Cristian score. Please see RN notes for further details      "

## 2018-09-10 NOTE — OP NOTE
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 The patient now presents for craniotomy after discussing therapeutic alternatives.          Surgeon: Mika Ervin MD     Assistants: None    Anesthesia: General endotracheal anesthesia    ASA Class: 3    Procedure Details   After obtaining informed consent, having the risks and benefits of the procedure explained including but not limited to infection, bleeding, paralysis, blindness, spinal fluid leak, bowel bladder incontinence, speech and memory problems, stroke, coma, and death.  The patient was brought to the operating.  He was given general anesthesia via an endotracheal tube.  He was laid supine on operating table.  His head was placed in 3 point Polanco head harness and turned to the left.  A bump was placed under the right shoulder.  The patient was then registered with stereotactic navigation.  A preplanned incision the right parietal area was marked with indelible marker.  A small amount of hair was removed using electric clippers.  The patient was then prepped and draped in a standard sterile fashion.  The preplanned incision was infiltrated Marcaine and epinephrine.  A 10 blade scalpel was used to make incisions the dermis and epidermis.  Bovie cautery was used to extend the incision down to the subjacent soft tissues to level the paracranium.  The pericranium was then dissected off the outer table the skull using Bovie cautery and blunt dissection.  Martinez clips were applied to the skin edges.  2 cerebellar retractors were placed into the wound.  The craniotome was then used to drill 2 bur holes.  A Penfield 3 was used to separate the dura from  the inner table the skull.  A side cutting bur with a footplate was used to turn a cranial flap.  The flap was then placed into an antibiotic solution.  A 4-0 Nurolon was used to tent the dura.  A 15 blade scalpel edges make an incision to the dura.  Then dural scissors were used to turn a horseshoe-shaped flap.  The dura was then tacked up under tension using 4 Nurolon's.  The gyrus was selected using stereotactic navigation.  It was coagulated using bipolar cautery.  At this point the operative microscope was brought in.  A 15 blade scalpel edges make incision to the gladis.  A corticectomy is then conducted using a Penfield 1 until the tumor was encountered.  The Chan retractor system was then deployed and used to retract the normal brain.  The tumor was then debulked using a combination of an ultrasonic aspirator, bipolar cautery and suction.  Once we are satisfied that would safely debulked as much of the tumor is possible the tumor bed was packed with thrombin Gelfoam.  It was inspected for hemostasis.  It was then lined with Surgicel.  The dura was then reapproximated using a series of running 4-0 Nurolon's.  A large piece of DuraGen was then placed over the dural incision.  The bone flap was then replaced using 4 bur hole covers and mini screws.  The wound was then copiously irrigated with an antibiotic solution.  It was inspected for hemostasis.  The galea was reapproximated using a series of inverted interrupted 2-0 Vicryl sutures.  The skin was then closed using a running 4-0 Monocryl.  All sponge needle and instrument counts were correct at the end the procedure.  The patient was extubated in stable condition returned recovery with about 100 mL of blood loss.    Findings:  Brain tumor]    Estimated Blood Loss:  100ml           Drains: None           Total IV Fluids: ml           Specimens:   ID Type Source Tests Collected by Time   A : BRAIN TUMOR FOR FROZEN Tissue Brain TISSUE PATHOLOGY EXAM Aziza  Mika ALCAZAR MD 9/10/2018 1434   B : BRAIN TUMOR FOR PERMANENT Tissue Brain TISSUE PATHOLOGY EXAM Mika Ervin MD 9/10/2018 1434              Implants:   Implant Name Type Inv. Item Serial No.  Lot No. LRB No. Used   GELFOAM PWDR 1GM - [id number] Implant GELFOAM PWDR 1GM  [employer]ER  Right 1   SPNG HEMO GELFOAM COLLGN  - [id number] Implant SPNG HEMO GELFOAM COLLGN   [employer]  Right 2   DURALMATRIX DURAGEN PLS 3X3IN 5PK - [id number] Implant DURALMATRIX DURAGEN PLS 3X3IN 5PK  INTEGRA [id number] Right 1   SCRW THINFLAP SD XDRIVE 1.5X4 - [id number] Implant SCRW THINFLAP SD XDRIVE 1.5X4  BIOMET MICROFIXATION NA Right 12   CVR THINFLAP BUR HOLE BENT 24MM - [id number] Implant CVR THINFLAP BUR HOLE BENT 24MM   BIOMET MICROFIXATION NA Right 4              Complications:  None           Disposition: PACU - hemodynamically stable.           Condition: stable        Mika Ervin MD

## 2018-09-10 NOTE — ANESTHESIA PROCEDURE NOTES
Arterial Line    Patient location during procedure: holding area  Start time: 9/10/2018 1:22 PM  Stop Time:9/10/2018 1:24 PM       Performed By   Anesthesiologist: ANAY POWELL  Preanesthetic Checklist  Completed: patient identified, site marked, surgical consent, pre-op evaluation, timeout performed, IV checked, risks and benefits discussed and monitors and equipment checked  Arterial Line Prep   Sterile Tech: cap, gloves and sterile barriers  Prep: ChloraPrep  Patient monitoring: EKG, continuous pulse oximetry and blood pressure monitoring  Arterial Line Procedure   Laterality:left  Location:  radial artery  Catheter size: 20 G   Guidance: palpation technique  Number of attempts: 1  Successful placement: yes          Post Assessment   Dressing Type: wrist guard applied, secured with tape and occlusive dressing applied.   Complications no  Circ/Move/Sens Assessment: normal and unchanged.   Patient Tolerance: patient tolerated the procedure well with no apparent complications

## 2018-09-10 NOTE — THERAPY TREATMENT NOTE
Acute Care - Physical Therapy Treatment Note  [facility]     Patient Name: Dwight Cobian  : 1940  MRN: [id number]  Today's Date: 9/10/2018  Onset of Illness/Injury or Date of Surgery: 18  Date of Referral to PT: 18  Referring Physician: Dr. Ervin    Admit Date: 2018    Visit Dx:    ICD-10-CM ICD-9-CM   1. Impaired mobility Z74.09 799.89   2. Impaired mobility and ADLs Z74. 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)   • Unsteady gait   • Forgetfulness   • Neoplasm, brain (CMS/HCC)   • Malignant neoplasm of brain (CMS/HCC)       Therapy Treatment          Rehabilitation Treatment Summary     Row Name 09/10/18 0946 09/10/18 0839          Treatment Time/Intention    Discipline physical therapy assistant  -NW occupational therapy assistant  -TS     Document Type therapy note (daily note)  -NW --  -TS2     Subjective Information no complaints  -NW2  --     Comment crani today  -NW2 Crani today   -TS2     Existing Precautions/Restrictions fall  -NW2  --     Recorded by [NW] Mary Ann Stone, PTA 09/10/18 0957  [NW2] Mary Ann Stone, PTA 09/10/18 1005 [TS] Qiana Heart, SMITH/L 09/10/18 0839  [TS2] Qiana Heart, SMITH/L 09/10/18 0840     Row Name 09/10/18 0946             Safety Issues, Functional Mobility    Impairments Affecting Function (Mobility) balance  -NW      Recorded by [NW] Mary Ann Stone, PTA 09/10/18 1005      Row Name 09/10/18 0946             Bed Mobility Assessment/Treatment    Supine-Sit Caldwell (Bed Mobility) conditional independence  -NW      Sit-Supine [place] (Bed Mobility) conditional independence  -NW      Recorded by [NW] Mary Ann Stone, PTA 09/10/18 1005      Row Name 09/10/18 0946             Sit-Stand Transfer    Sit-Stand Caldwell (Transfers) supervision  -NW      Recorded by [NW] Mary Ann Stone, PTA 09/10/18 1005      Row  Name 09/10/18 0946             Stand-Sit Transfer    Stand-Sit Keith (Transfers) verbal cues;contact guard  -NW      Recorded by [NW] Mary Ann Stone, [facility] 09/10/18 1005      Row Name 09/10/18 0946             Gait/Stairs Assessment/Training    Keith Level (Gait) verbal cues;contact guard  -NW      Assistive Device (Gait) --   HH  -NW      Distance in Feet (Gait) 200   200x3  -NW      Deviations/Abnormal Patterns (Gait) stride length decreased;gait speed decreased  -NW      Left Sided Gait Deviations heel strike decreased  -NW      Comment (Gait/Stairs) unsteady gait w/ minor LOBs w/ self correct  -NW      Recorded by [NW] Mary Ann Stone, [facility] 09/10/18 1005      Row Name 09/10/18 0946             General ROM    GENERAL ROM COMMENTS BLE AROM x15 standing  -NW      Recorded by [NW] Mary Ann Stone, [facility] 09/10/18 1005      Row Name 09/10/18 0946             Dynamic Balance Activity    Therapeutic Training Performed (Dynamic Balance) backward walking;functional activities with reaching or leaning any direction;360 degree turns;side stepping  -NW      Recorded by [NW] Mary Ann Stone, [facility] 09/10/18 1005      Row Name 09/10/18 0946             Positioning and Restraints    Pre-Treatment Position in bed  -NW      Post Treatment Position bed  -NW      In Bed fowlers;call light within reach;encouraged to call for assist;with family/caregiver;exit alarm on  -NW      Recorded by [NW] Mary Ann Stone, [facility] 09/10/18 1005      Row Name 09/10/18 0946             Pain Scale: Numbers Pre/Post-Treatment    Pain Scale: Numbers, Pretreatment 0/10 - no pain  -NW      Recorded by [CHAYITO] Mary Ann Stone, [facility] 09/10/18 1005        User Key  (r) = Recorded By, (t) = Taken By, (c) = Cosigned By    Initials Name Effective Dates Discipline    Qiana Sevilla, SARAH/L 08/02/16 -  OT    Mary Ann Headley, [facility] 08/02/16 -  PT                     Physical Therapy Education     Title: PT OT SLP Therapies (Active)     Topic:  Physical Therapy (Active)     Point: Mobility training (Active)    Learning Progress Summary     Learner Status Readiness Method Response Comment Documented by    Patient Active Acceptance E NR Educated on coordination and balance activities  09/09/18 0845     Done Eager E VU,NR Educated patient on improving heel strike  09/08/18 0936     Done Acceptance E,D DU,VU benefits of PT and POC, call for assist OOB  09/07/18 1122    Family Done Acceptance E,D DU,VU benefits of PT and POC, call for assist OOB  09/07/18 1122          Point: Precautions (Done)    Learning Progress Summary     Learner Status Readiness Method Response Comment Documented by    Patient Done Acceptance E,D DU,VU benefits of PT and POC, call for assist OOB  09/07/18 1122    Family Done Acceptance E,D DU,VU benefits of PT and POC, call for assist OOB  09/07/18 1122                      User Key     Initials Effective Dates Name Provider Type Discipline     08/02/16 -  Jose Medrano, PT Physical Therapist PT     08/02/16 -  Brennan Nolasco, PTA Physical Therapy Assistant PT                    PT Recommendation and Plan                Outcome Measures     Row Name 09/10/18 1000 09/09/18 0813 09/08/18 0905       How much help from another person do you currently need...    Turning from your back to your side while in flat bed without using bedrails? 4  -NW 4  -RILEY 4  -RILEY    Moving from lying on back to sitting on the side of a flat bed without bedrails? 4  -NW 4  -RILEY 4  -RILEY    Moving to and from a bed to a chair (including a wheelchair)? 3  -NW 3  -RILEY 3  -RILEY    Standing up from a chair using your arms (e.g., wheelchair, bedside chair)? 3  -NW 3  -RILEY 3  -RILEY    Climbing 3-5 steps with a railing? 3  -NW 3  -RILEY 3  -RILEY    To walk in hospital room? 3  -NW 3  -RILEY 3  -RILEY    AM-PAC 6 Clicks Score 20  -NW 20  -RILEY 20  -RILEY       Functional Assessment    Outcome Measure Options AM-PAC 6 Clicks Basic Mobility (PT)  -NW AM-PAC 6 Clicks Basic Mobility  (PT)  -RILEY AM-PAC 6 Clicks Basic Mobility (PT)  -RILEY      User Key  (r) = Recorded By, (t) = Taken By, (c) = Cosigned By    Initials Name Provider Type    NW Mary Ann Stone, BELKIS Physical Therapy Assistant    Brennan Vargas, BELKIS Physical Therapy Assistant           Time Calculation:         PT Charges     Row Name 09/10/18 1005             Time Calculation    Start Time 0946  -NW      Stop Time 1009  -NW      Time Calculation (min) 23 min  -NW      PT Received On 09/10/18  -NW      PT Goal Re-Cert Due Date 09/17/18  -NW         Time Calculation- PT    Total Timed Code Minutes- PT 23 minute(s)  -NW         Timed Charges    83610 - Gait Training Minutes  15  -NW      39728 - PT Therapeutic Activity Minutes 8  -NW        User Key  (r) = Recorded By, (t) = Taken By, (c) = Cosigned By    Initials Name Provider Type    NW Mary Ann Stone, BELKIS Physical Therapy Assistant        Therapy Suggested Charges     Code   Minutes Charges    69910 (CPT®) Hc Pt Neuromusc Re Education Ea 15 Min      93274 (CPT®) Hc Pt Ther Proc Ea 15 Min      10742 (CPT®) Hc Gait Training Ea 15 Min 15 1    58582 (CPT®) Hc Pt Therapeutic Act Ea 15 Min 8 1    32705 (CPT®) Hc Pt Manual Therapy Ea 15 Min      93613 (CPT®) Hc Pt Iontophoresis Ea 15 Min      92392 (CPT®) Hc Pt Elec Stim Ea-Per 15 Min      29438 (CPT®) Hc Pt Ultrasound Ea 15 Min      56158 (CPT®) Hc Pt Self Care/Mgmt/Train Ea 15 Min      77889 (CPT®) Hc Pt Prosthetic (S) Train Initial Encounter, Each 15 Min      66023 (CPT®) Hc Pt Orthotic(S)/Prosthetic(S) Encounter, Each 15 Min      49750 (CPT®) Hc Orthotic(S) Mgmt/Train Initial Encounter, Each 15min      Total  23 2        Therapy Charges for Today     Code Description Service Date Service Provider Modifiers Qty    25277359746 HC GAIT TRAINING EA 15 MIN 9/10/2018 Mary Ann Stone, PTA GP, KX 1    55403257835 HC PT THER PROC EA 15 MIN 9/10/2018 Mary Ann Stone, PTA GP, KX 1          PT G-Codes  Outcome Measure Options: AM-PAC 6  Clicks Basic Mobility (PT)  AM-PAC 6 Clicks Score: 20  Score: 18  Functional Limitation: Mobility: Walking and moving around  Mobility: Walking and Moving Around Current Status (): At least 20 percent but less than 40 percent impaired, limited or restricted  Mobility: Walking and Moving Around Goal Status (): At least 1 percent but less than 20 percent impaired, limited or restricted    Mary Ann Stone, PTA  9/10/2018

## 2018-09-10 NOTE — ANESTHESIA PREPROCEDURE EVALUATION
Anesthesia Evaluation     Patient summary reviewed and Nursing notes reviewed   no history of anesthetic complications:  NPO Solid Status: > 8 hours  NPO Liquid Status: > 8 hours           Airway   Mallampati: I  TM distance: >3 FB  Neck ROM: full  No difficulty expected  Dental          Pulmonary - negative pulmonary ROS    breath sounds clear to auscultation  Cardiovascular - negative cardio ROS  Exercise tolerance: good (4-7 METS)    Rhythm: regular  Rate: normal        Neuro/Psych- negative ROS    ROS Comment: 6cm intracrainal mass. On keppra for seizure prophylaxis, decadron for swelling  GI/Hepatic/Renal/Endo    (+)   diabetes mellitus type 2,     Musculoskeletal         ROS comment: 1 month h/o gait difficulty, cognitive changes  Abdominal    Substance History - negative use     OB/GYN negative ob/gyn ROS         Other   (+) arthritis   history of cancer (brain tumor) active                  Anesthesia Plan    ASA 3     general     intravenous induction   Anesthetic plan, all risks, benefits, and alternatives have been provided, discussed and informed consent has been obtained with: patient.

## 2018-09-10 NOTE — PROGRESS NOTES
"Dwight Cobian  78 y.o.      Chief complaint:   Brain tumor    Subjective:  Symptoms:  Stable.    Diet:  NPO.    Activity level: Normal.    Pain:  He reports no pain.      No events    Temp:  [97.9 °F (36.6 °C)-98.7 °F (37.1 °C)] 97.9 °F (36.6 °C)  Heart Rate:  [68-98] 68  Resp:  [18-20] 18  BP: (127-155)/(54-91) 138/91      Objective:  General Appearance:  Comfortable, well-appearing, in no acute distress and not in pain.    Vital signs: (most recent): Blood pressure 138/91, pulse 68, temperature 97.9 °F (36.6 °C), temperature source Oral, resp. rate 18, height 182.9 cm (72\"), weight 79.5 kg (175 lb 3.2 oz), SpO2 95 %.  Vital signs are normal.  No fever.    Output: Producing urine.    HEENT: Normal HEENT exam.    Lungs:  Normal effort and normal respiratory rate.  He is not in respiratory distress.    Heart: Normal rate.  Regular rhythm.    Chest: Symmetric chest wall expansion.   Skin:  Warm and dry.    Abdomen: Abdomen is soft and non-distended.    Pulses: Distal pulses are intact.        Neurologic Exam     Mental Status   Oriented to person, place, and time.   Attention: normal. Concentration: normal.   Speech: speech is normal   Level of consciousness: alert    Cranial Nerves   Cranial nerves II through XII intact.     Motor Exam   Muscle bulk: normal    Strength   Strength 5/5 throughout.     Sensory Exam   Light touch normal.       Principal Problem:    Malignant neoplasm of brain (CMS/HCC)  Active Problems:    Neoplasm, brain (CMS/HCC)      Lab Results (last 24 hours)     Procedure Component Value Units Date/Time    POC Glucose Once [[id number]]  (Abnormal) Collected:  09/10/18 0739    Specimen:  Blood Updated:  09/10/18 0750     Glucose 227 (H) mg/dL      Comment: : [id number] Gil (Francis) ID: [id number]       Comprehensive Metabolic Panel [[id number]]  (Abnormal) Collected:  09/10/18 0729    Specimen:  Blood Updated:  09/10/18 0747     Glucose 224 (H) mg/dL      BUN 24 (H) mg/dL      " Creatinine 0.90 mg/dL      Sodium 138 mmol/L      Potassium 4.3 mmol/L      Chloride 103 mmol/L      CO2 27.0 mmol/L      Calcium 8.4 mg/dL      Total Protein 5.9 (L) g/dL      Albumin 3.50 g/dL      ALT (SGPT) 34 U/L      AST (SGOT) 24 U/L      Alkaline Phosphatase 60 U/L      Total Bilirubin 0.5 mg/dL      eGFR Non African Amer 82 mL/min/1.73      Globulin 2.4 gm/dL      A/G Ratio 1.5 g/dL      BUN/Creatinine Ratio 26.7 (H)     Anion Gap 8.0 mmol/L     Narrative:       The MDRD GFR formula is only valid for adults with stable renal function between ages 18 and 70.    CBC (No Diff) [[id number]]  (Abnormal) Collected:  09/10/18 0729    Specimen:  Blood Updated:  09/10/18 0737     WBC 9.75 10*3/mm3      RBC 4.45 (L) 10*6/mm3      Hemoglobin 12.8 (L) g/dL      Hematocrit 38.0 (L) %      MCV 85.4 fL      MCH 28.8 pg      MCHC 33.7 g/dL      RDW 12.3 %      RDW-SD 38.3 (L) fl      MPV 11.3 fL      Platelets 190 10*3/mm3     POC Glucose Once [[id number]]  (Abnormal) Collected:  09/09/18 2120    Specimen:  Blood Updated:  09/09/18 2135     Glucose 269 (H) mg/dL      Comment: : [id number] Joaquin Fofana LMeter ID: [id number]       POC Glucose Once [[id number]]  (Abnormal) Collected:  09/09/18 1708    Specimen:  Blood Updated:  09/09/18 1720     Glucose 193 (H) mg/dL      Comment: : [id number] Cierra Dasilvaeter ID: [id number]       POC Glucose Once [[id number]]  (Abnormal) Collected:  09/09/18 1201    Specimen:  Blood Updated:  09/09/18 1212     Glucose 273 (H) mg/dL      Comment: : [id number] Cierra FelixhonMeter ID: [id number]                 Plan:   OR today. Patient doing well. BG better controlld    LAURA Rodriguez

## 2018-09-10 NOTE — PLAN OF CARE
Problem: Patient Care Overview  Goal: Plan of Care Review  Outcome: Ongoing (interventions implemented as appropriate)   09/10/18 2644   Plan of Care Review   Progress no change   OTHER   Outcome Summary RD nutrition assessment completed. Pt is currently in OR. PO intake prior to OR averaged 100% of 4 meals charted. D/C plans are unknown at this time. Will follow for d/c needs.

## 2018-09-10 NOTE — ANESTHESIA PROCEDURE NOTES
Airway  Urgency: elective    Airway not difficult    General Information and Staff    Patient location during procedure: OR  CRNA: PEPITO CROSS    Indications and Patient Condition  Indications for airway management: airway protection    Preoxygenated: yes  Mask difficulty assessment: 1 - vent by mask    Final Airway Details  Final airway type: endotracheal airway      Successful airway: ETT  Cuffed: yes   Successful intubation technique: direct laryngoscopy  Endotracheal tube insertion site: oral  Blade: Marroquin  Blade size: 2  ETT size: 7.5 mm  Cormack-Lehane Classification: grade I - full view of glottis  Placement verified by: capnometry   Measured from: lips  ETT to lips (cm): 23  Number of attempts at approach: 1

## 2018-09-10 NOTE — PROGRESS NOTES
[facility]  INPATIENT PROGRESS NOTE    Patient Name: Dwight Cobian  Date of Admission: 9/6/2018  Today's Date: 09/10/18  Length of Stay: 4  Primary Care Physician: Saurav Lima MD    Subjective   Chief Complaint: brain tumor    HPI   Currently sitting up in bed with wife and multiple family members at bedside.  He denies any headache or pain.  Anxious about surgery.  No acute changes.     Review of Systems   Constitutional: Positive for activity change and fatigue. Negative for appetite change, chills and fever.   HENT: Negative for hearing loss, nosebleeds, tinnitus and trouble swallowing.    Eyes: Negative for visual disturbance.   Respiratory: Negative for cough, chest tightness, shortness of breath and wheezing.    Cardiovascular: Negative for chest pain, palpitations and leg swelling.   Gastrointestinal: Negative for abdominal distention, abdominal pain, blood in stool, constipation, diarrhea, nausea and vomiting.   Endocrine: Negative for cold intolerance, heat intolerance, polydipsia, polyphagia and polyuria.   Genitourinary: Negative for decreased urine volume, difficulty urinating, dysuria, flank pain, frequency and hematuria.   Musculoskeletal: Negative for arthralgias, joint swelling and myalgias.   Skin: Negative for rash.   Allergic/Immunologic: Negative for immunocompromised state.   Neurological: Positive for weakness. Negative for dizziness, syncope, light-headedness and headaches.   Hematological: Negative for adenopathy. Does not bruise/bleed easily.   Psychiatric/Behavioral: Positive for confusion. Negative for sleep disturbance. The patient is not nervous/anxious.       All pertinent negatives and positives are as above. All other systems have been reviewed and are negative unless otherwise stated.     Objective    Temp:  [97.9 °F (36.6 °C)-98.7 °F (37.1 °C)] 98.1 °F (36.7 °C)  Heart Rate:  [55-98] 55  Resp:  [18-20] 20  BP:  (127-155)/(54-91) 138/91     Physical Exam   Constitutional: He is oriented to person, place, and time. He appears well-developed and well-nourished.   Sitting up in bed. NAD.   HENT:   Head: Normocephalic and atraumatic.   Eyes: Pupils are equal, round, and reactive to light. Conjunctivae and EOM are normal.   Neck: Neck supple. No JVD present. No thyromegaly present.   Cardiovascular: Normal rate, regular rhythm, normal heart sounds and intact distal pulses.  Exam reveals no gallop and no friction rub.    No murmur heard.  Pulmonary/Chest: Effort normal and breath sounds normal. No respiratory distress. He has no wheezes. He has no rales. He exhibits no tenderness.   Abdominal: Soft. Bowel sounds are normal. He exhibits no distension. There is no tenderness. There is no rebound and no guarding.   Musculoskeletal: Normal range of motion. He exhibits no edema, tenderness or deformity.   Lymphadenopathy:     He has no cervical adenopathy.   Neurological: He is alert and oriented to person, place, and time. He displays normal reflexes. No cranial nerve deficit. He exhibits normal muscle tone.   Skin: Skin is warm and dry. No rash noted.   Psychiatric: He has a normal mood and affect. His behavior is normal. Judgment and thought content normal.   Nursing note and vitals reviewed.    Results Review:  I have reviewed the labs, radiology results, and diagnostic studies.    Laboratory Data:     Results from last 7 days  Lab Units 09/10/18  0729 09/06/18  1308   WBC 10*3/mm3 9.75 6.32   HEMOGLOBIN g/dL 12.8* 13.2*   HEMATOCRIT % 38.0* 38.9*   PLATELETS 10*3/mm3 190 191       Results from last 7 days  Lab Units 09/10/18  0729 09/07/18  1730 09/06/18  1308   SODIUM mmol/L 138 132* 135   POTASSIUM mmol/L 4.3 4.9 4.6   CHLORIDE mmol/L 103 98 98   CO2 mmol/L 27.0 21.0* 27.0   BUN mg/dL 24* 25* 22*   CREATININE mg/dL 0.90 1.13 1.01   CALCIUM mg/dL 8.4 8.6 9.1   BILIRUBIN mg/dL 0.5  --  0.5   ALK PHOS U/L 60  --  94   ALT (SGPT)  U/L 34  --  29   AST (SGOT) U/L 24  --  27   GLUCOSE mg/dL 224* 678*  678* 469*     Radiology Data:   Imaging Results (last 24 hours)     ** No results found for the last 24 hours. **        I have reviewed the patient's current medications.     Assessment/Plan     Assessment:   1.  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  Type II diabetes, with hyperglycemia  3.  Gait disturbance  4.  Acute cognitive impairment  5.  Large cortical cyst involving the upper pole of the left kidney  6.  5 mm nodule in the left upper lobe     Plan:   1.  Continue Levemir 20 units nightly   2.  Glucoses: 163, 273, 269, 185.  3.  Keppra and Decadron continues  4.  Craniotomy today.   5.  CBC and BMP in AM  6.  Continue high dose SSI   7.  Metformin 500 mg BID, currently held since NPO.  8.  IV fluids continued    Discharge Planning: Per attending.     LAURA Mcpherson   09/10/18   11:58 AM     Chart reviewed.   Patient unavailable for evaluation   Agree with assessment and plan, discussed with LEONARDO Xiong DO  09/10/18  3:04 PM

## 2018-09-11 NOTE — PROGRESS NOTES
"Progress Note    Patient:  Dwight Cobian  YOB: 1940  MRN: [id number]   Admit date: 9/6/2018   Admitting Physician: Mika Ervin MD  Primary Care Physician: Saurav Lima MD    Chief Complaint/Interval History: brain tumor, doing well post op.     Intake/Output Summary (Last 24 hours) at 09/11/18 0720  Last data filed at 09/11/18 0400   Gross per 24 hour   Intake           3671.4 ml   Output             2500 ml   Net           1171.4 ml     Allergies: No Known Allergies  Current Scheduled Medications:     atorvastatin 10 mg Oral Nightly   dexamethasone 4 mg Oral Q8H   insulin detemir 20 Units Subcutaneous Nightly   insulin lispro 0-24 Units Subcutaneous 4x Daily With Meals & Nightly   insulin lispro 5 Units Subcutaneous TID With Meals   levETIRAcetam 500 mg Oral Q12H   metFORMIN 500 mg Oral BID With Meals   pantoprazole 40 mg Oral Q AM   sertraline 50 mg Oral Daily     Current PRN Medications:  bisacodyl  •  dextrose  •  dextrose  •  glucagon (human recombinant)  •  HYDROcodone-acetaminophen  •  labetalol  •  ondansetron    Review of Systems   Constitutional: Negative for fever.       Vital Signs:  /55   Pulse 79   Temp 97.5 °F (36.4 °C) (Oral)   Resp 16   Ht 182.9 cm (72\")   Wt 79.5 kg (175 lb 3.2 oz)   SpO2 95%   BMI 23.76 kg/m²     Physical Exam   Constitutional: He is oriented to person, place, and time. Vital signs are normal. He appears well-developed and well-nourished.   HENT:   Head: Normocephalic.   Eyes: Pupils are equal, round, and reactive to light. EOM are normal.   Neck: Normal range of motion.   Cardiovascular: Normal rate and regular rhythm.    Pulmonary/Chest: Effort normal.   Musculoskeletal: Normal range of motion.   Neurological: He is alert and oriented to person, place, and time. He has normal strength and normal reflexes. No cranial nerve deficit or sensory deficit. Gait normal. GCS eye subscore is 4. GCS verbal subscore is 5. GCS motor subscore is 6.   Skin: " "Skin is warm and dry.   Incision clean dry and intact   Psychiatric: He has a normal mood and affect. His speech is normal and behavior is normal. Thought content normal.     Vital signs reviewed.  Line/IV site: No erythema, warmth, induration, or tenderness.    Objective:  General Appearance:  Comfortable, well-appearing, in no acute distress and not in pain.    Vital signs: (most recent): Blood pressure 123/55, pulse 79, temperature 97.5 °F (36.4 °C), temperature source Oral, resp. rate 16, height 182.9 cm (72\"), weight 79.5 kg (175 lb 3.2 oz), SpO2 95 %.  Vital signs are normal.  No fever.    Output: Producing urine.    Lungs:  Normal effort and normal respiratory rate.    Heart: Normal rate.  Regular rhythm.    Chest: Symmetric chest wall expansion.   Extremities: Normal range of motion.    Skin:  Warm and dry.        Neurologic Exam     Mental Status   Oriented to person, place, and time.   Attention: normal. Concentration: normal.   Speech: speech is normal   Level of consciousness: alert    Cranial Nerves   Cranial nerves II through XII intact.     CN III, IV, VI   Pupils are equal, round, and reactive to light.  Extraocular motions are normal.     Motor Exam   Muscle bulk: normal    Strength   Strength 5/5 throughout.     Sensory Exam   Light touch normal.       Lab Results:  ABG:     CBC:   Results from last 7 days  Lab Units 09/11/18  0330 09/10/18  0729 09/06/18  1308   WBC 10*3/mm3 13.56* 9.75 6.32   HEMOGLOBIN g/dL 11.7* 12.8* 13.2*   HEMATOCRIT % 34.2* 38.0* 38.9*   PLATELETS 10*3/mm3 175 190 191     BMP:  Results from last 7 days  Lab Units 09/11/18  0330 09/10/18  0729 09/07/18  1730 09/06/18  1308   SODIUM mmol/L 137 138 132* 135   POTASSIUM mmol/L 3.9 4.3 4.9 4.6   CHLORIDE mmol/L 105 103 98 98   CO2 mmol/L 24.0 27.0 21.0* 27.0   BUN mg/dL 20 24* 25* 22*   CREATININE mg/dL 0.90 0.90 1.13 1.01   GLUCOSE mg/dL 185* 224* 678*  678* 469*   CALCIUM mg/dL 7.9* 8.4 8.6 9.1   ALT (SGPT) U/L  --  34  --  " 29     Culture Results:      Principal Problem:    Malignant neoplasm of brain (CMS/HCC)  Active Problems:    Neoplasm, brain (CMS/HCC)      Impression/Recommendations:   CV: HR and BP stable. Off cardene. hospitalist adjusting BP meds as needed  Resp; Oxygen level stable  GI: tolerating PO  : adequate UOP, BUN and creatinine stable  Neuro: exam stable, MRI today  If stable after MRI and BP controlled will transfer to floor    King Laguna APRN

## 2018-09-11 NOTE — THERAPY RE-EVALUATION
Acute Care - Physical Therapy Re-Evaluation  [facility]     Patient Name: Dwight Cobian  : 1940  MRN: [id number]  Today's Date: 2018   Onset of Illness/Injury or Date of Surgery: 18  Date of Referral to PT: 18  Referring Physician: Dr. Ervin       Admit Date: 2018    Visit Dx:     ICD-10-CM ICD-9-CM   1. Neoplasm, brain (CMS/HCC) D49.6 239.6   2. Impaired mobility Z74.09 799.89   3. Impaired mobility and ADLs Z74.09 799.89   4. Malignant neoplasm of brain (CMS/HCC) C71.9 191.9   5. 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)   • Unsteady gait   • Forgetfulness   • Neoplasm, brain (CMS/HCC)   • Malignant neoplasm of brain (CMS/HCC)     Past Medical History:   Diagnosis Date   • Arthritis    • Diabetes mellitus (CMS/HCC)    • Fractured skull (CMS/HCC) 2016    garage door fell on him     Past Surgical History:   Procedure Laterality Date   • CRANIOTOMY FOR TUMOR Right 9/10/2018    Procedure: CRANIOTOMY FOR TUMOR RIGHT;  Surgeon: Mika Ervin MD;  Location: [facility];  Service: Neurosurgery        PT ASSESSMENT (last 12 hours)      Physical Therapy Evaluation     Row Name 18 0833          PT Evaluation Time/Intention    Subjective Information complains of;pain  -MIMI (r) KE (t) MIMI (c)     Document Type re-evaluation  -MIMI (r) KE (t) MIMI (c)     Mode of Treatment physical therapy  -MIMI (r) KE (t) MIMI (c)     Comment Eval completed at 1300   -MIMI (r) KE (t) MIMI (c)     Row Name 18 0833          General Information    Patient Profile Reviewed? yes  -MIMI (r) KE (t) MIMI (c)     Onset of Illness/Injury or Date of Surgery 18  -MIMI (r) KE (t) MIMI (c)     Referring Physician Dr. Ervin   -MIMI (r) KE (t) MIMI (c)     Patient Observations alert;cooperative;agree to therapy  -MIMI (r) KE (t) MIMI (c)     Patient/Family  Observations family in room   -MIMI (r) KE (t) MIMI (c)     General Observations of Patient pt fowlers in bed, alerak and awake   -MIMI (r) KE (t) MIMI (c)     Prior Level of Function independent:;gait;transfer;ADL's  -MIMI (r) KE (t) MIMI (c)     Equipment Currently Used at Home walker, rolling;wheelchair;shower chair  -MIMI (r) KE (t) MIMI (c)     Pertinent History of Current Functional Problem pt had 1 month hx of L sided weakness, cognitive issues; diagnosed with R parietal tumor. Pt underwent craniotomy 9/10/18  -MIMI (r) KE (t) MIMI (c)     Existing Precautions/Restrictions fall  -MIMI (r) KE (t) MIMI (c)     Limitations/Impairments safety/cognitive  -MIMI (r) KE (t) MIMI (c)     Risks Reviewed patient:;LOB;dizziness;nausea/vomiting;increased discomfort;change in vital signs  -MIMI (r) KE (t) MIMI (c)     Benefits Reviewed patient:;improve function;increase independence;increase strength;increase balance;decrease pain;decrease risk of DVT;increase knowledge  -MIMI (r) KE (t) MIMI (c)     Barriers to Rehab medically complex;cognitive status  -MIMI (r) KE (t) MIMI (c)     Row Name 09/11/18 0833          Relationship/Environment    Lives With spouse  -MIMI (r) DEMOND (t) MIMI (c)     Family Caregiver if Needed spouse  -MIMI (r) KE (t) MIMI (c)     Row Name 09/11/18 0833          Resource/Environmental Concerns    Current Living Arrangements home/apartment/condo  -MIMI (r) DEMOND (t) MIMI (c)     Row Name 09/11/18 0833          Stairs Within Home, Primary    Stairs, Within Home, Primary pt reports they live primarily on ground floor   -MIMI (r) KE (t) MIMI (c)     Number of Stairs, Within Home, Primary other (see comments)   approx 20 steps   -MIMI (r) KE (t) MIMI (c)     Stair Railings, Within Home, Primary railings on both sides of stairs  -MIMI (r) KE (t) MIMI (c)     Row Name 09/11/18 0833          Cognitive Assessment/Interventions    Additional Documentation Cognitive Assessment/Intervention (Group)  -MIMI (r) KE (t) MIMI (c)     Row Name 09/11/18 0833          Cognitive  Assessment/Intervention- PT/OT    Affect/Mental Status (Cognitive) WFL  -MIMI (r) KE (t) MIMI (c)     Orientation Status (Cognition) oriented x 3  -MIMI (r) KE (t) MIMI (c)     Follows Commands (Cognition) follows one step commands;75-90% accuracy;repetition of directions required;verbal cues/prompting required;visual queue  -MIMI (r) KE (t) MIMI (c)     Cognitive Function (Cognitive) safety deficit  -MIMI (r) KE (t) MIMI (c)     Safety Deficit (Cognitive) mild deficit;problem solving;safety precautions awareness;judgment;awareness of need for assistance;impulsivity;insight into deficits/self awareness;ability to follow commands  -MIMI (r) KE (t) MIMI (c)     Personal Safety Interventions fall prevention program maintained;gait belt;muscle strengthening facilitated;nonskid shoes/slippers when out of bed;supervised activity  -MIMI (r) KE (t) MIMI (c)     Row Name 09/11/18 0833          Safety Issues, Functional Mobility    Safety Issues Affecting Function (Mobility) ability to follow commands;awareness of need for assistance;impulsivity;judgment;insight into deficits/self awareness  -MIMI (r) KE (t) MIMI (c)     Impairments Affecting Function (Mobility) balance;coordination;cognition  -MIMI (r) KE (t) MIMI (c)     Row Name 09/11/18 0833          Bed Mobility Assessment/Treatment    Bed Mobility Assessment/Treatment rolling right;scooting/bridging;supine-sit;sit-supine  -MIMI (r) KE (t) MIMI (c)     Rolling Right [place] (Bed Mobility) contact guard  -MIMI (r) KE (t) MIMI (c)     Scooting/Bridging [place] (Bed Mobility) contact guard;verbal cues  -MIMI (r) KE (t) MIMI (c)     Supine-Sit [place] (Bed Mobility) contact guard;verbal cues  -MIMI (r) KE (t) MIMI (c)     Sit-Supine [place] (Bed Mobility) contact guard;verbal cues  -MIMI (r) KE (t) MIMI (c)     Bed Mobility, Safety Issues cognitive deficits limit understanding  -MIMI (r) KE (t) MIMI (c)     Assistive Device (Bed Mobility) bed rails;head of bed elevated  -MIMI (r) KE (t) MIMI (c)     Row Name  09/11/18 [facility]          Transfer Assessment/Treatment    Transfer Assessment/Treatment sit-stand transfer;stand-sit transfer  -MIMI (r) KE (t) MIMI (c)     Comment (Transfers) Pt lacks eccentric control when transferring stand/sit   -MIMI (r) KE (t) MIMI (c)     Sit-Stand [place] (Transfers) contact guard  -MIMI (r) KE (t) MIMI (c)     Stand-Sit [place] (Transfers) contact guard;verbal cues  -MIMI (r) KE (t) MIMI (c)     Row Name 09/11/18 [facility]          Gait/Stairs Assessment/Training    Gait/Stairs Assessment/Training gait/ambulation independence;distance ambulated;gait/ambulation assistive device;gait pattern;gait deviations  -MIMI (r) KE (t) MIMI (c)     [place] Level (Gait) contact guard;minimum assist (75% patient effort)  -MIMI (r) KE (t) MIMI (c)     Distance in Feet (Gait) 80 feet  -MIMI (r) KE (t) MIMI (c)     Pattern (Gait) other (see comments)  -MIMI (r) KE (t) MIMI (c)     Deviations/Abnormal Patterns (Gait) left sided deviations;base of support, narrow;base of support, wide;gait speed decreased;ataxic  -MIMI (r) KE (t) MIMI (c)     Left Sided Gait Deviations leans left  -MIMI (r) KE (t) MIMI (c)     Comment (Gait/Stairs) Pt consistently leans L when ambulating. Gait pattern varied from step-to to step-through with varying PAM. Pt had difficulty turning and required [place] with verbal cues.  Pt also demonstrated decreased arm movement with ambulation but was able to correct with VC.   -MIMI (r)  MIMI (c)     Row Name 09/11/18 08          General ROM    GENERAL ROM COMMENTS BUE and BLE ROM WFL  -MIMI (r) KE (t) MIMI (c)     Row Name 09/11/18 0833          MMT (Manual Muscle Testing)    General MMT Comments BUE and BLE MMT 5/5 except for L hip flexion 4/5   -MIMI (r) KE (t) MIMI (c)     Row Name 09/11/18 33          Motor Assessment/Intervention    Additional Documentation Balance (Group)  -MIMI (r) KE (t) MIMI (c)     Row Name 09/11/18 [facility]          Balance    Balance static sitting balance;dynamic sitting balance;static standing balance  -MIMI  (r) KE (t) MIMI (c)     Row Name 09/11/18 [facility]          Static Sitting Balance    Level of [place] (Unsupported Sitting, Static Balance) supervision  -MIMI (r) KE (t) MIMI (c)     Sitting Position (Unsupported Sitting, Static Balance) sitting on edge of bed  -MIMI (r) KE (t) MIMI (c)     Time Able to Maintain Position (Unsupported Sitting, Static Balance) 2 to 3 minutes  -MIMI (r) KE (t) MIMI (c)     Comment (Unsupported Sitting, Static Balance) pt leans posterior/Left  -MIMI (r) KE (t) MIMI (c)     Row Name 09/11/18 [facility]          Dynamic Sitting Balance    Level of [place], Reaches Outside Midline (Sitting, Dynamic Balance) minimal assist, 75% patient effort  -MIMI (r) KE (t) MIMI (c)     Sitting Position, Reaches Outside Midline (Sitting, Dynamic Balance) sitting on edge of bed  -MIMI (r) KE (t) MIMI (c)     Comment, Reaches Outside Midline (Sitting, Dynamic Balance) While sitting EOB pt donned/doffed socks. Pt demosntrated a posterior/Left lean with [place] and VC to self correct.   -MIMI (r) KE (t) MIMI (c)     Row Name 09/11/18 [facility]          Static Standing Balance    Level of [place] (Supported Standing, Static Balance) contact guard assist  -MIMI (r) KE (t) MIMI (c)     Time Able to Maintain Position (Supported Standing, Static Balance) --  -MIMI (r) KE (t) MIMI (c)     Comment (Supported Standing, Static Balance) narrow PAM, slight lean posterior/Left  -MIMI (r) KE (t) MIMI (c)     Row Name 09/11/18 [facility]          Dynamic Standing Balance    Level of [place], Reaches Outside Midline (Standing, Dynamic Balance) minimal assist, 75% patient effort  -MIMI (r) KE (t) MIMI (c)     Row Name 09/11/18 [facility]          Sensory Assessment/Intervention    Sensory General Assessment no sensation deficits identified  -MIMI (r) KE (t) MIMI (c)     Row Name 09/11/18 [facility]          Pain Scale: Numbers Pre/Post-Treatment    Pain Scale: Numbers, Pretreatment 7/10  -MIMI (r) KE (t) MIMI (c)     Pain Scale: Numbers, Post-Treatment 7/10  -MIMI (r) KE (t) MIMI (c)     Pain  Location head  -MIMI (r) KE (t) MIMI (c)     Pain Intervention(s) Repositioned;Ambulation/increased activity  -MIMI (r) KE (t) MIMI (c)     Row Name             Wound 09/10/18 1602 Right head incision    Wound - Properties Group Date first assessed: 09/10/18  -MICHAELA Time first assessed: 1602  -MICHAELA Side: Right  -MICHAELA Location: head  -MICHAELA Type: incision  -MICHAELA    Row Name 09/11/18 0833          Plan of Care Review    Plan of Care Reviewed With patient;family  -MIMI (r) KE (t) MIMI (c)     Row Name 09/11/18 0833          Physical Therapy Clinical Impression    Date of Referral to PT 09/06/18  -MIMI (r) KE (t) MIMI (c)     Functional Level at Time of Evaluation (PT Clinical Impression) independent with bed mobility and transfers, CGA/[place] with ambulation  -MIMI (r) KE (t) MIMI (c)     Patient/Family Goals Statement (PT Clinical Impression) return home   -MIMI (r) KE (t) MIMI (c)     Criteria for Skilled Interventions Met (PT Clinical Impression) yes;treatment indicated  -MIMI (r) KE (t) MIMI (c)     Pathology/Pathophysiology Noted (Describe Specifically for Each System) neuromuscular  -MIMI (r) KE (t) MIMI (c)     Impairments Found (describe specific impairments) ergonomics and body mechanics;gait, locomotion, and balance;neuromotor development and sensory integration;muscle performance  -MIMI (r) KE (t) MIMI (c)     Functional Limitations in Following Categories (Describe Specific Limitations) self-care;home management;community/leisure  -MIMI (r) KE (t) MIMI (c)     Disability: Inability to Perform Actions/Activities of Required Roles (describe specific disability) community/leisure  -MIMI (r) KE (t) MIMI (c)     Rehab Potential (PT Clinical Summary) good, to achieve stated therapy goals  -MIMI (r) KE (t) MIMI (c)     Predicted Duration of Therapy (PT) duration of stay   -MIMI (r) KE (t) MIMI (c)     Care Plan Review (PT) evaluation/treatment results reviewed;risks/benefits reviewed;care plan/treatment goals reviewed;current/potential barriers reviewed;patient/other agree to  care plan  -MIMI (r) KE (t) MIMI (c)     Care Plan Review, Other Participant (PT Clinical Impression) family  -MIMI (r) KE (t) MIMI (c)     Row Name 09/11/18 [facility]          Physical Therapy Goals    Transfer Goal Selection (PT) transfer, PT goal 1  -MIMI     Gait Training Goal Selection (PT) gait training, PT goal 1  -MIMI     Balance Goal Selection (PT) balance, PT goal 1  -MIMI     Additional Documentation Balance Goal Selection (PT) (Row)  -MIMI     Row Name 09/11/18 08          Transfer Goal 1 (PT)    Progress/Outcome (Transfer Goal 1, PT) good progress toward goal;goal not met;goal ongoing  -MIMI (r) KE (t) MIMI (c)     Row Name 09/11/18 08          Gait Training Goal 1 (PT)    Activity/Assistive Device (Gait Training Goal 1, PT) gait (walking locomotion);turning, left;turning, right;diminish gait deviation  -MIMI (r) KE (t) MIMI (c)     [place] Level (Gait Training Goal 1, PT) standby assist  -MIMI (r) KE (t) MIMI (c)     Distance (Gait Goal 1, PT) 200 feet  -MIMI (r) KE (t) MIMI (c)     Time Frame (Gait Training Goal 1, PT) long term goal (LTG);by discharge  -MIMI (r) KE (t) MIMI (c)     Progress/Outcome (Gait Training Goal 1, PT) goal revised this date;goal ongoing  -MIMI (r) KE (t) MIMI (c)     Row Name 09/11/18 08          Balance Goal 1 (PT)    Progress/Outcomes (Balance Goal 1, PT) goal not met;goal ongoing  -MIMI (r) KE (t) MIMI (c)     Row Name 09/11/18 08          Positioning and Restraints    Pre-Treatment Position in bed  -MIMI (r) KE (t) MIMI (c)     Post Treatment Position bed  -MIMI (r) KE (t) MIMI (c)     In Bed fowlers;call light within reach;encouraged to call for assist;with family/caregiver;exit alarm on;side rails up x2  -MIMI (r) KE (t) MIMI (c)     Row Name 09/11/18 [facility]          Living Environment    Home Accessibility tub/shower is not walk in;stairs within home  -MIMI (r) KE (t) MIMI (c)       User Key  (r) = Recorded By, (t) = Taken By, (c) = Cosigned By    Initials Name Provider Type    Bam Krishnamurthy PT DPT Physical  Therapist    Fanny aGrcia, RN Registered Nurse    Aurea Isaac, PT Student PT Student          Physical Therapy Education     Title: PT OT SLP Therapies (Done)     Topic: Physical Therapy (Done)     Point: Mobility training (Done)    Learning Progress Summary     Learner Status Readiness Method Response Comment Documented by    Patient Done Acceptance E VU,NR Pt educated on purpose of PT and POC. Pt educated on safety awareness when transferring and ambulating. Pt educated on gait mechanics.  09/11/18 1358     Active Acceptance E NR Educated on coordination and balance activities  09/09/18 0845     Done Eager E VU,NR Educated patient on improving heel strike  09/08/18 0936     Done Acceptance E,D DU,VU benefits of PT and POC, call for assist OOB  09/07/18 1122    Family Done Acceptance E,D DU,VU benefits of PT and POC, call for assist OOB  09/07/18 1122          Point: Precautions (Done)    Learning Progress Summary     Learner Status Readiness Method Response Comment Documented by    Patient Done Acceptance E VU,NR Pt educated on purpose of PT and POC. Pt educated on safety awareness when transferring and ambulating. Pt educated on gait mechanics.  09/11/18 1358     Done Acceptance E,D DU,VU benefits of PT and POC, call for assist OOB  09/07/18 1122    Family Done Acceptance E,D DU,VU benefits of PT and POC, call for assist OOB  09/07/18 1122                      User Key     Initials Effective Dates Name Provider Type Discipline     08/02/16 -  Jose Medrano, PT Physical Therapist PT     08/02/16 -  Brennan Nolasco, PTA Physical Therapy Assistant PT     07/30/18 -  Aurea Palacio, PT Student PT Student PT                PT Recommendation and Plan  Anticipated Discharge Disposition (PT): inpatient rehabilitation facility  Therapy Frequency (PT Clinical Impression): 2 times/day  Outcome Summary/Treatment Plan (PT)  Anticipated Discharge Disposition (PT): inpatient rehabilitation  facility  Plan of Care Reviewed With: patient  Progress: improving  Outcome Summary: Prior to this episode, pt was independent with all ADLs, gait, and transfers. Pt currently requires CGA for bed mobility and transfers and CGA/[place] for ambulation. Pt demonstrates impaired dynamic sitting balance and a lack of safety awareness, as well as L sided gait deviations. Recommend pt continue skilled PT to improve balance in order to be able to perform ADLs and to improve gait impairments and safety awareness to decrease risk of falling. Recommend pt d/c to IRF.           Outcome Measures     Row Name 09/11/18 1300 09/10/18 1000 09/09/18 0813       How much help from another person do you currently need...    Turning from your back to your side while in flat bed without using bedrails? 4  -MIMI (r) KE (t) MIMI (c) 4  -NW 4  -RILEY    Moving from lying on back to sitting on the side of a flat bed without bedrails? 4  -MIMI (r) KE (t) MIMI (c) 4  -NW 4  -RILEY    Moving to and from a bed to a chair (including a wheelchair)? 3  -MIMI (r) KE (t) MIMI (c) 3  -NW 3  -RILEY    Standing up from a chair using your arms (e.g., wheelchair, bedside chair)? 3  -MIMI (r) KE (t) MIMI (c) 3  -NW 3  -RILEY    Climbing 3-5 steps with a railing? 3  -MIMI (r) KE (t) MIMI (c) 3  -NW 3  -RILEY    To walk in hospital room? 3  -MIMI (r) KE (t) MIMI (c) 3  -NW 3  -RILEY    AM-PAC 6 Clicks Score 20  -MIMI (r) KE (t) 20  -NW 20  -RILEY       Functional Assessment    Outcome Measure Options AM-PAC 6 Clicks Basic Mobility (PT)  -MIMI (r) KE (t) MIMI (c) AM-PAC 6 Clicks Basic Mobility (PT)  -NW AM-PAC 6 Clicks Basic Mobility (PT)  -RILEY      User Key  (r) = Recorded By, (t) = Taken By, (c) = Cosigned By    Initials Name Provider Type    MIMI Bam Stone, PT DPT Physical Therapist    NW Mary Ann Stone, PTA Physical Therapy Assistant    Brennan Vargas, PTA Physical Therapy Assistant    Aurea Isaac, PT Student PT Student           Time Calculation:         PT Charges     Row Name 09/11/18  1408             Time Calculation    Start Time 1310   chart review 8:20-8:32  -MIMI (r) KE (t) MIMI (c)      Stop Time 1330  -MIMI (r) KE (t) MIMI (c)      Time Calculation (min) 20 min  -MIMI (r) KE (t)      PT Received On 09/11/18  -MIMI (r) KE (t) MIMI (c)      PT Goal Re-Cert Due Date 09/21/18  -MIMI (r) KE (t) MIMI (c)        User Key  (r) = Recorded By, (t) = Taken By, (c) = Cosigned By    Initials Name Provider Type    Bam Krishnamurthy, PT DPT Physical Therapist    Aurea Isaac, PT Student PT Student        Therapy Suggested Charges     Code   Minutes Charges    65666 (CPT®) Hc Pt Neuromusc Re Education Ea 15 Min      59620 (CPT®) Hc Pt Ther Proc Ea 15 Min      44961 (CPT®) Hc Gait Training Ea 15 Min 15 1    64306 (CPT®) Hc Pt Therapeutic Act Ea 15 Min 8 1    37134 (CPT®) Hc Pt Manual Therapy Ea 15 Min      72960 (CPT®) Hc Pt Iontophoresis Ea 15 Min      06817 (CPT®) Hc Pt Elec Stim Ea-Per 15 Min      90347 (CPT®) Hc Pt Ultrasound Ea 15 Min      59786 (CPT®) Hc Pt Self Care/Mgmt/Train Ea 15 Min      37422 (CPT®) Hc Pt Prosthetic (S) Train Initial Encounter, Each 15 Min      52373 (CPT®) Hc Pt Orthotic(S)/Prosthetic(S) Encounter, Each 15 Min      07441 (CPT®) Hc Orthotic(S) Mgmt/Train Initial Encounter, Each 15min      Total  23 2        Therapy Charges for Today     Code Description Service Date Service Provider Modifiers Qty    68008934388 HC PT RE-EVAL ESTABLISHED PLAN 2 9/11/2018 Aurea Palacio, PT Student GP, KX 1          PT G-Codes  PT Professional Judgement Used?: Yes  Outcome Measure Options: AM-PAC 6 Clicks Basic Mobility (PT)  AM-PAC 6 Clicks Score: 20  Score: 18  Functional Limitation: Mobility: Walking and moving around  Mobility: Walking and Moving Around Current Status (): At least 20 percent but less than 40 percent impaired, limited or restricted  Mobility: Walking and Moving Around Goal Status (): At least 1 percent but less than 20 percent impaired, limited or restricted      Aurea J  Reeds, PT Student  9/11/2018

## 2018-09-11 NOTE — PROGRESS NOTES
[facility]  INPATIENT PROGRESS NOTE    Patient Name: Dwight Cobian  Date of Admission: 9/6/2018  Today's Date: 09/11/18  Length of Stay: 5  Primary Care Physician: Saurav Lima MD    Subjective   Chief Complaint: brain tumor    HPI        Review of Systems   Constitutional: Positive for activity change and fatigue. Negative for appetite change, chills and fever.   HENT: Negative for hearing loss, nosebleeds, tinnitus and trouble swallowing.    Eyes: Negative for visual disturbance.   Respiratory: Negative for cough, chest tightness, shortness of breath and wheezing.    Cardiovascular: Negative for chest pain, palpitations and leg swelling.   Gastrointestinal: Negative for abdominal distention, abdominal pain, blood in stool, constipation, diarrhea, nausea and vomiting.   Endocrine: Negative for cold intolerance, heat intolerance, polydipsia, polyphagia and polyuria.   Genitourinary: Negative for decreased urine volume, difficulty urinating, dysuria, flank pain, frequency and hematuria.   Musculoskeletal: Negative for arthralgias, joint swelling and myalgias.   Skin: Negative for rash.   Allergic/Immunologic: Negative for immunocompromised state.   Neurological: Positive for weakness. Negative for dizziness, syncope, light-headedness and headaches.   Hematological: Negative for adenopathy. Does not bruise/bleed easily.   Psychiatric/Behavioral: Positive for confusion. Negative for sleep disturbance. The patient is not nervous/anxious.       All pertinent negatives and positives are as above. All other systems have been reviewed and are negative unless otherwise stated.     Objective    Temp:  [97.5 °F (36.4 °C)-98.1 °F (36.7 °C)] 97.5 °F (36.4 °C)  Heart Rate:  [] 79  Resp:  [6-20] 16  BP: ()/(34-93) 123/55  Arterial Line BP: (102-172)/(29-53) 141/38     Physical Exam   Constitutional: He is oriented to person, place, and time. He appears  well-developed and well-nourished.   Sitting up in bed. NAD.   HENT:   Head: Normocephalic and atraumatic.   Eyes: Pupils are equal, round, and reactive to light. Conjunctivae and EOM are normal.   Neck: Neck supple. No JVD present. No thyromegaly present.   Cardiovascular: Normal rate, regular rhythm, normal heart sounds and intact distal pulses.  Exam reveals no gallop and no friction rub.    No murmur heard.  Pulmonary/Chest: Effort normal and breath sounds normal. No respiratory distress. He has no wheezes. He has no rales. He exhibits no tenderness.   Abdominal: Soft. Bowel sounds are normal. He exhibits no distension. There is no tenderness. There is no rebound and no guarding.   Musculoskeletal: Normal range of motion. He exhibits no edema, tenderness or deformity.   Lymphadenopathy:     He has no cervical adenopathy.   Neurological: He is alert and oriented to person, place, and time. He displays normal reflexes. No cranial nerve deficit. He exhibits normal muscle tone.   Skin: Skin is warm and dry. No rash noted.   Psychiatric: He has a normal mood and affect. His behavior is normal. Judgment and thought content normal.   Nursing note and vitals reviewed.    Results Review:  I have reviewed the labs, radiology results, and diagnostic studies.    Laboratory Data:     Results from last 7 days  Lab Units 09/11/18  0330 09/10/18  0729 09/06/18  1308   WBC 10*3/mm3 13.56* 9.75 6.32   HEMOGLOBIN g/dL 11.7* 12.8* 13.2*   HEMATOCRIT % 34.2* 38.0* 38.9*   PLATELETS 10*3/mm3 175 190 191       Results from last 7 days  Lab Units 09/11/18  0330 09/10/18  0729 09/07/18  1730 09/06/18  1308   SODIUM mmol/L 137 138 132* 135   POTASSIUM mmol/L 3.9 4.3 4.9 4.6   CHLORIDE mmol/L 105 103 98 98   CO2 mmol/L 24.0 27.0 21.0* 27.0   BUN mg/dL 20 24* 25* 22*   CREATININE mg/dL 0.90 0.90 1.13 1.01   CALCIUM mg/dL 7.9* 8.4 8.6 9.1   BILIRUBIN mg/dL  --  0.5  --  0.5   ALK PHOS U/L  --  60  --  94   ALT (SGPT) U/L  --  34  --  29    AST (SGOT) U/L  --  24  --  27   GLUCOSE mg/dL 185* 224* 678*  678* 469*     Radiology Data:   Imaging Results (last 24 hours)     ** No results found for the last 24 hours. **        I have reviewed the patient's current medications.     Assessment/Plan     Assessment:   1.  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  Type II diabetes, with hyperglycemia  3.  Gait disturbance  4.  Acute cognitive impairment  5.  Large cortical cyst involving the upper pole of the left kidney  6.  5 mm nodule in the left upper lobe     Plan:   1.  Continue Levemir 20 units nightly   2.  Glucoses: 269, 223, 248, 185.  3.  Keppra and Decadron continues  4.  Craniotomy 9/10/18 POD #1  5.  CBC and BMP in AM  6.  Continue high dose SSI   7.  Metformin 500 mg BID  8.  IV fluids continued  9.  PT/OT consult  10.  MRI this AM per NSG    Discharge Planning: Per attending.     LAURA Mcpherson   09/11/18   6:40 AM     Chart reviewed.   Patient examinedAgree with assessment and plan.   Discussed with patient and LEONARDO Xiong DO  09/11/18  7:52 AM

## 2018-09-11 NOTE — PLAN OF CARE
Problem: Patient Care Overview  Goal: Plan of Care Review  Outcome: Ongoing (interventions implemented as appropriate)    Goal: Individualization and Mutuality  Outcome: Ongoing (interventions implemented as appropriate)    Goal: Discharge Needs Assessment  Outcome: Ongoing (interventions implemented as appropriate)    Goal: Interprofessional Rounds/Family Conf  Outcome: Ongoing (interventions implemented as appropriate)      Problem: Confusion, Acute (Adult)  Goal: Cognitive/Functional Impairments Minimized  Outcome: Ongoing (interventions implemented as appropriate)    Goal: Safety  Outcome: Ongoing (interventions implemented as appropriate)      Problem: Fall Risk (Adult)  Goal: Absence of Fall  Outcome: Ongoing (interventions implemented as appropriate)

## 2018-09-11 NOTE — PROGRESS NOTES
[facility]  INPATIENT PROGRESS NOTE    Patient Name: Dwight Cobian  Date of Admission: 9/6/2018  Today's Date: 09/11/18  Length of Stay: 5  Primary Care Physician: Saurav Lima MD    Subjective   Chief Complaint: brain tumor    HPI   Laying in bed.  Discussed with Dr. Ervin and Bob Laguna NP at bedside.  He states that he has some burning pain from the incision but otherwise he feels ok.  Getting ready to have some breakfast and glad he is able to go to the floor today.  Wife at bedside.     Review of Systems   Constitutional: Positive for activity change and fatigue. Negative for appetite change, chills and fever.   HENT: Negative for hearing loss, nosebleeds, tinnitus and trouble swallowing.    Eyes: Negative for visual disturbance.   Respiratory: Negative for cough, chest tightness, shortness of breath and wheezing.    Cardiovascular: Negative for chest pain, palpitations and leg swelling.   Gastrointestinal: Negative for abdominal distention, abdominal pain, blood in stool, constipation, diarrhea, nausea and vomiting.   Endocrine: Negative for cold intolerance, heat intolerance, polydipsia, polyphagia and polyuria.   Genitourinary: Negative for decreased urine volume, difficulty urinating, dysuria, flank pain, frequency and hematuria.   Musculoskeletal: Negative for arthralgias, joint swelling and myalgias.   Skin: Negative for rash.   Allergic/Immunologic: Negative for immunocompromised state.   Neurological: Positive for weakness. Negative for dizziness, syncope, light-headedness and headaches.   Hematological: Negative for adenopathy. Does not bruise/bleed easily.   Psychiatric/Behavioral: Positive for confusion. Negative for sleep disturbance. The patient is not nervous/anxious.       All pertinent negatives and positives are as above. All other systems have been reviewed and are negative unless otherwise stated.     Objective    Temp:  [97.5 °F  (36.4 °C)-98.1 °F (36.7 °C)] 97.5 °F (36.4 °C)  Heart Rate:  [] 79  Resp:  [6-20] 16  BP: ()/(34-93) 123/55  Arterial Line BP: (102-172)/(29-53) 141/38     Physical Exam   Constitutional: He is oriented to person, place, and time. He appears well-developed and well-nourished.   Sitting up in bed. NAD.   HENT:   Head: Normocephalic and atraumatic.   Eyes: Pupils are equal, round, and reactive to light. Conjunctivae and EOM are normal.   Neck: Neck supple. No JVD present. No thyromegaly present.   Cardiovascular: Normal rate, regular rhythm, normal heart sounds and intact distal pulses.  Exam reveals no gallop and no friction rub.    No murmur heard.  Pulmonary/Chest: Effort normal and breath sounds normal. No respiratory distress. He has no wheezes. He has no rales. He exhibits no tenderness.   Abdominal: Soft. Bowel sounds are normal. He exhibits no distension. There is no tenderness. There is no rebound and no guarding.   Musculoskeletal: Normal range of motion. He exhibits no edema, tenderness or deformity.   Lymphadenopathy:     He has no cervical adenopathy.   Neurological: He is alert and oriented to person, place, and time. He displays normal reflexes. No cranial nerve deficit. He exhibits normal muscle tone.   Skin: Skin is warm and dry. No rash noted.   Psychiatric: He has a normal mood and affect. His behavior is normal. Judgment and thought content normal.   Nursing note and vitals reviewed.    Results Review:  I have reviewed the labs, radiology results, and diagnostic studies.    Laboratory Data:     Results from last 7 days  Lab Units 09/11/18  0330 09/10/18  0729 09/06/18  1308   WBC 10*3/mm3 13.56* 9.75 6.32   HEMOGLOBIN g/dL 11.7* 12.8* 13.2*   HEMATOCRIT % 34.2* 38.0* 38.9*   PLATELETS 10*3/mm3 175 190 191       Results from last 7 days  Lab Units 09/11/18  0330 09/10/18  0729 09/07/18  1730 09/06/18  1308   SODIUM mmol/L 137 138 132* 135   POTASSIUM mmol/L 3.9 4.3 4.9 4.6   CHLORIDE  mmol/L 105 103 98 98   CO2 mmol/L 24.0 27.0 21.0* 27.0   BUN mg/dL 20 24* 25* 22*   CREATININE mg/dL 0.90 0.90 1.13 1.01   CALCIUM mg/dL 7.9* 8.4 8.6 9.1   BILIRUBIN mg/dL  --  0.5  --  0.5   ALK PHOS U/L  --  60  --  94   ALT (SGPT) U/L  --  34  --  29   AST (SGOT) U/L  --  24  --  27   GLUCOSE mg/dL 185* 224* 678*  678* 469*     Radiology Data:   Imaging Results (last 24 hours)     ** No results found for the last 24 hours. **        I have reviewed the patient's current medications.     Assessment/Plan     Assessment:   1.  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  Type II diabetes, with hyperglycemia  3.  Gait disturbance  4.  Acute cognitive impairment  5.  Large cortical cyst involving the upper pole of the left kidney  6.  5 mm nodule in the left upper lobe     Plan:   1.  Continue Levemir 20 units nightly   2.  Glucoses: 269, 223, 248, 185.  3.  Keppra and Decadron continues  4.  Craniotomy 9/10/18 POD #1  5.  CBC and BMP in AM  6.  Continue high dose SSI   7.  Metformin 500 mg BID  8.  IV fluids continued  9.  PT/OT consult  10.  MRI this AM per NSG    Discharge Planning: Per attending.     LAURA Mcpherson   09/11/18   8:01 AM     Chart reviewed.   Patient examinedAgree with assessment and plan.   Discussed with patient and LEONARDO Xiong DO  09/11/18  7:52 AM

## 2018-09-11 NOTE — PLAN OF CARE
Problem: Patient Care Overview  Goal: Plan of Care Review  Outcome: Ongoing (interventions implemented as appropriate)   09/11/18 0036   Coping/Psychosocial   Plan of Care Reviewed With patient   Plan of Care Review   Progress no change   OTHER   Outcome Summary OT re-eval completed. Pt s/p crani 9/10. Demos decreased dynamic sitting balance, w posterior and L lean w LB ADL task. Able to complete LB ADL w decreased FMC/GMC and [place] to maintin upright balance. Pt demos decreased functional balance amb in [place] with decreased awareness and ability to self-correct, [place] and vc required. Pt is at a significant risk of falls d/t decreased balance w functional mobility and ADL combined w decreased safety and self-awareness to self-correct. Skilled OT required to address functional balance, cognitive impairements, and education for increased safety and independence w ADL. OT recommends d/c to inpatient rehab.

## 2018-09-11 NOTE — THERAPY EVALUATION
Acute Care - Occupational Therapy Initial Evaluation  [facility]     Patient Name: Dwight Cobian  : 1940  MRN: [id number]  Today's Date: 2018  Onset of Illness/Injury or Date of Surgery: 18  Date of Referral to OT: 09/10/18  Referring Physician: Dr. Ervin     Admit Date: 2018       ICD-10-CM ICD-9-CM   1. Neoplasm, brain (CMS/HCC) D49.6 239.6   2. Impaired mobility Z74.09 799.89   3. Impaired mobility and ADLs Z74.09 799.89   4. Malignant neoplasm of brain (CMS/HCC) C71.9 191.9   5. 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;  Service: Neurosurgery          OT ASSESSMENT FLOWSHEET (last 72 hours)      Occupational Therapy Evaluation     Row Name 18 0800 18 0232                OT Evaluation Time/Intention    Subjective Information complains of;pain   headache  -MW  --       Document Type re-evaluation  -MW  --       Mode of Treatment occupational therapy  -MW  --       Comment Eval completed at 1300  -MW  --          General Information    Patient Profile Reviewed? yes  -MW  --       Onset of Illness/Injury or Date of Surgery 18  -MW  --       Referring Physician Dr. Ervin  -  --       Patient Observations cooperative;alert;agree to therapy  -MW  --       Patient/Family Observations famiy present  -MW  --       General Observations of Patient awake and  "alert flat in bed, recently arrived to floor, dressing to head c/d/i  -MW  --       Prior Level of Function independent:;all household mobility;community mobility;ADL's;home management;driving;work;yard work   ~1 mo h/o cognitive issues, \"walking off\", L side weak  -MW  --       Equipment Currently Used at Home walker, rolling;wheelchair;shower chair  -MW  --       Pertinent History of Current Functional Problem 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; 1 month h/o of processing and cognitive issues, leaving water running, walking off, trouble walking, LLE/LUE not functioning properly, Dx: neoplasm, brain  -MW  --       Existing Precautions/Restrictions fall  -MW  --       Limitations/Impairments safety/cognitive  -MW  --       Risks Reviewed patient and family:;LOB;nausea/vomiting;dizziness;increased discomfort;increased drainage;change in vital signs;lines disloged  -MW  --       Benefits Reviewed patient and family:;improve function;increase independence;increase strength;decrease pain;increase balance;decrease risk of DVT;improve skin integrity;increase knowledge  -MW  --       Barriers to Rehab medically complex;cognitive status  -MW  --          Relationship/Environment    Lives With spouse  -MW  --       Family Caregiver if Needed spouse  -MW  --          Resource/Environmental Concerns    Current Living Arrangements home/apartment/condo  -MW  --          Stairs Within Home, Primary    Number of Stairs, Within Home, Primary --   20 per [place]phew  -MW  --       Stair Railings, Within Home, Primary railings on both sides of stairs  -MW  --          Cognitive Assessment/Interventions    Additional Documentation Cognitive Assessment/Intervention (Group)  -MW  --          Cognitive Assessment/Intervention- PT/OT    Orientation Status (Cognition) oriented to;person;place;situation  -MW  --       Follows Commands (Cognition) follows one step commands;50-74% accuracy;physical/tactile prompts required;verbal " cues/prompting required  -MW  --       Cognitive Function (Cognitive) safety deficit  -MW  --       Safety Deficit (Cognitive) mild deficit;ability to follow commands;moderate deficit;awareness of need for assistance;insight into deficits/self awareness;safety precautions awareness  -MW  --       Personal Safety Interventions elopement precautions initiated;fall prevention program maintained;gait belt;nonskid shoes/slippers when out of bed;supervised activity  -MW  --          Safety Issues, Functional Mobility    Safety Issues Affecting Function (Mobility) ability to follow commands;awareness of need for assistance;insight into deficits/self awareness;safety precaution awareness  -MW  --       Impairments Affecting Function (Mobility) balance;pain  -MW  --          Bed Mobility Assessment/Treatment    Bed Mobility Assessment/Treatment scooting/bridging;supine-sit;sit-supine  -MW  --       Scooting/Bridging [place] (Bed Mobility) supervision;contact guard;verbal cues  -MW  --       Supine-Sit Price (Bed Mobility) verbal cues;contact guard  -MW  --       Sit-Supine [place] (Bed Mobility) verbal cues;contact guard  -MW  --          Functional Mobility    Functional Mobility- Ind. Level contact guard assist;minimum assist (75% patient effort)  -MW  --       Functional Mobility- Safety Issues balance decreased during turns  -MW  --       Functional Mobility- Comment Amb to end of [place] w CGA for walking in a straight line. Pt deviates often and multiple LOB requiring Charlotte to correct. Pt demos decreased awareness of balance impairment and does not self-correct without physical assist.  -MW  --          Transfer Assessment/Treatment    Transfer Assessment/Treatment sit-stand transfer;stand-sit transfer  -MW  --          Sit-Stand Transfer    Sit-Stand [place] (Transfers) contact guard  -MW  --          Stand-Sit Transfer    Stand-Sit [place] (Transfers) contact guard  -MW  --          ADL  Assessment/Intervention    BADL Assessment/Intervention lower body dressing  -MW  --          Lower Body Dressing Assessment/Training    Lower Body Dressing [place] Level don;doff;socks;minimum assist (75% patient effort)  -MW  --       Lower Body Dressing Position edge of bed sitting  -MW  --       Comment (Lower Body Dressing) Significant posterior and L lean with donning socks, difficulty manipulating sock and maintaining balance to complete. Randall required for balance, completes task w set up and increased time.  -MW  --          BADL Safety/Performance    Impairments, BADL Safety/Performance balance;cognition;trunk/postural control;coordination  -MW  --       Cognitive Impairments, BADL Safety/Performance awareness, need for assistance;insight into deficits/self awareness;problem solving/reasoning  -MW  --          General ROM    GENERAL ROM COMMENTS BUE AROM WFL  -MW  --          MMT (Manual Muscle Testing)    General MMT Comments BUE 5/5 grossly  -MW  --          Motor Assessment/Interventions    Additional Documentation Balance (Group);Fine Motor Testing & Training (Group);Gross Motor Coordination (Group)  -MW  --          Gross Motor Coordination    Gross Motor Impairments balance;coordination;finger to nose;rapid alternating  -MW  --       Gross Motor Skill, Impairments Detail unable to maintain upright balance during GMC testing, corrects w vc; NEHEMIAS/FTN intact  -MW  --          Balance    Balance static sitting balance;static standing balance;dynamic sitting balance;dynamic standing balance  -MW  --          Static Sitting Balance    Level of [place] (Unsupported Sitting, Static Balance) contact guard assist;standby assist  -MW  --       Sitting Position (Unsupported Sitting, Static Balance) sitting on edge of bed  -MW  --       Comment (Unsupported Sitting, Static Balance) posterior and L lean present w static sitting  -MW  --          Dynamic Sitting Balance    Level of [place], Reaches  Outside Midline (Sitting, Dynamic Balance) minimal assist, 75% patient effort  -MW  --       Sitting Position, Reaches Outside Midline (Sitting, Dynamic Balance) sitting on edge of bed  -MW  --       Comment, Reaches Outside Midline (Sitting, Dynamic Balance) LOB posteriorly and L  -MW  --          Static Standing Balance    Level of [place] (Supported Standing, Static Balance) contact guard assist  -MW  --          Dynamic Standing Balance    Level of [place], Reaches Outside Midline (Standing, Dynamic Balance) minimal assist, 75% patient effort  -MW  --          Fine Motor Testing & Training    Comment, Fine Motor Coordination R hand opposition minimal difficulty with accuracy, L hand intact  -MW  --          Sensory Assessment/Intervention    Sensory General Assessment no sensation deficits identified   per pt  -MW  --          Positioning and Restraints    Pre-Treatment Position in bed  -MW  --       Post Treatment Position bed  -MW  --       In Bed fowlers;call light within reach;encouraged to call for assist;exit alarm on;with family/caregiver;side rails up x2  -MW  --          Pain Assessment    Additional Documentation Pain Scale: Numbers Pre/Post-Treatment (Group)  -MW  --          Pain Scale: Numbers Pre/Post-Treatment    Pain Scale: Numbers, Pretreatment 7/10  -MW  --       Pain Scale: Numbers, Post-Treatment 7/10  -MW  --       Pain Location head  -MW  --       Pre/Post Treatment Pain Comment forehead/behind his eyes  -MW  --       Pain Intervention(s) Repositioned;Ambulation/increased activity  -MW  --          Wound 09/10/18 1602 Right head incision    Wound - Properties Group Date first assessed: 09/10/18  -MICHAELA Time first assessed: 1602  -MICHAELA Side: Right  -MICHAELA Location: head  -MICHAELA Type: incision  -MICHAELA       Clinical Impression (OT)    Date of Referral to OT 09/10/18  -MW  --       OT Diagnosis decreased ADL  -MW  --       Prognosis (OT Eval) good  -MW  --       Patient/Family Goals Statement (OT  Eval) increase balance and independence  -MW  --       Criteria for Skilled Therapeutic Interventions Met (OT Eval) yes;treatment indicated  -MW  --       Rehab Potential (OT Eval) good, to achieve stated therapy goals  -MW  --       Therapy Frequency (OT Eval) daily  -MW  --       Predicted Duration of Therapy Intervention (Therapy Eval) until d/c  -MW  --       Care Plan Review (OT) evaluation/treatment results reviewed;care plan/treatment goals reviewed;risks/benefits reviewed;current/potential barriers reviewed;patient/other agree to care plan  -MW  --       Care Plan Review, Other Participant (OT Eval) family  -MW  --       Anticipated Equipment Needs at Discharge (OT) shower chair;raised toilet seat;front wheeled walker  -MW  --       Anticipated Discharge Disposition (OT) inpatient rehabilitation facility  -MW  --          OT Goals    Dressing Goal Selection (OT)  -- dressing, OT goal 1  -MW       Toileting Goal Selection (OT)  -- toileting, OT goal 1  -MW       Balance Goal Selection (OT)  -- balance, OT goal 1  -MW       Coordination Goal Selection (OT)  -- coordination, OT goal 1  -MW       Additional Documentation  -- Balance Goal Selection (OT) (Row)  -MW          Dressing Goal 1 (OT)    Activity/Assistive Device (Dressing Goal 1, OT)  -- dressing skills, all  -MW       [place]/Cues Needed (Dressing Goal 1, OT)  -- minimum assist (75% or more patient effort)  -MW       Time Frame (Dressing Goal 1, OT)  -- by discharge  -MW       Barriers (Dressing Goal 1, OT)  -- .  -MW       Progress/Outcome (Dressing Goal 1, OT)  -- goal ongoing  -MW          Toileting Goal 1 (OT)    Activity/Device (Toileting Goal 1, OT)  -- toileting skills, all  -MW       [place] Level/Cues Needed (Toileting Goal 1, OT)  -- contact guard assist  -MW       Time Frame (Toileting Goal 1, OT)  -- by discharge  -MW       Barriers (Toileting Goal 1, OT)  -- .  -MW       Progress/Outcome (Toileting Goal 1, OT)  -- goal ongoing   -MW          Balance Goal 1 (OT)    Activity/Assistive Device (Balance Goal 1, OT)  -- standing, dynamic   10 min functional activity w self-correction/awareness   -MW       [place] Level/Cues Needed (Balance Goal 1, OT)  -- supervision required  -MW       Time Frame (Balance Goal 1, OT)  -- long term goal (LTG);by discharge  -MW       Progress/Outcomes (Balance Goal 1, OT)  -- goal ongoing  -MW          Coordination Goal 1 (OT)    Activity/Assistive Device (Coordination Goal 1, OT)  -- FM task;GM task  -MW       [place] Level/Cues Needed (Coordination Goal 1, OT)  -- supervision required;verbal cues required  -MW       Time Frame (Coordination Goal 1, OT)  -- by discharge  -MW       Barriers (Coordination Goal 1, OT)  -- .  -MW       Progress/Outcomes (Coordination Goal 1, OT)  -- goal ongoing  -MW          Living Environment    Home Accessibility stairs within home;tub/shower is not walk in  -MW  --         User Key  (r) = Recorded By, (t) = Taken By, (c) = Cosigned By    Initials Name Effective Dates    Fanny Garcia RN 08/02/16 -      Mariama Dwyer OTR/L 08/28/18 -            Occupational Therapy Education     Title: PT OT SLP Therapies (Done)     Topic: Occupational Therapy (Done)     Point: ADL training (Done)     Description: Instruct learner(s) on proper safety adaptation and remediation techniques during self care or transfers.   Instruct in proper use of assistive devices.   Learning Progress Summary     Learner Status Readiness Method Response Comment Documented by    Patient Done Acceptance LEONARDO murillo awareness, balance, ADL, OT POC  09/11/18 1611     Done Acceptance E JUAN ROGERS,NR Pt and family educated regarding OT POC, fall precautions, purpose/use of a gait belt, and body mechanics to increase safety/independence in completing ADL's.  09/07/18 1086          Point: Precautions (Done)     Description: Instruct learner(s) on prescribed precautions during self-care and functional  transfers.   Learning Progress Summary     Learner Status Readiness Method Response Comment Documented by    Patient Done Acceptance E VU safery awareness, balance, ADL, OT POC  09/11/18 1611     Done Acceptance E JUAN ROGERS,NR Pt and family educated regarding OT POC, fall precautions, purpose/use of a gait belt, and body mechanics to increase safety/independence in completing ADL's.  09/07/18 1359          Point: Body mechanics (Done)     Description: Instruct learner(s) on proper positioning and spine alignment during self-care, functional mobility activities and/or exercises.   Learning Progress Summary     Learner Status Readiness Method Response Comment Documented by    Patient Done Acceptance E VU safery awareness, balance, ADL, OT POC  09/11/18 1611     Done Acceptance E SUE,JUAN,ZAKI Pt and family educated regarding OT POC, fall precautions, purpose/use of a gait belt, and body mechanics to increase safety/independence in completing ADL's.  09/07/18 1359                      User Key     Initials Effective Dates Name Provider Type Discipline     08/28/18 -  Mariama Dwyer, OTR/L Occupational Therapist OT     07/03/18 -  Kirstie Hernandez, OT Student OT Student OT                  OT Recommendation and Plan  Outcome Summary/Treatment Plan (OT)  Anticipated Equipment Needs at Discharge (OT): shower chair, raised toilet seat, front wheeled walker  Anticipated Discharge Disposition (OT): inpatient rehabilitation facility  Therapy Frequency (OT Eval): daily  Plan of Care Review  Plan of Care Reviewed With: patient  Plan of Care Reviewed With: patient  Outcome Summary: OT re-eval completed. Pt s/p crani 9/10. Demos decreased dynamic sitting balance, w posterior and L lean w LB ADL task. Able to complete LB ADL w decreased FMC/GMC and [place] to maintin upright balance. Pt demos decreased functional balance amb in [place] with decreased awareness and ability to self-correct, [place] and vc required. Pt is at a  significant risk of falls d/t decreased balance w functional mobility and ADL combined w decreased safety and self-awareness to self-correct. Skilled OT required to address functional balance, cognitive impairements, and education for increased safety and independence w ADL. OT recommends d/c to inpatient rehab.          Outcome Measures     Row Name 09/11/18 1300 09/10/18 1000 09/09/18 0813       How much help from another person do you currently need...    Turning from your back to your side while in flat bed without using bedrails? 4  -MIMI (r) KE (t) MIMI (c) 4  -NW 4  -RILEY    Moving from lying on back to sitting on the side of a flat bed without bedrails? 4  -MMII (r) KE (t) MIMI (c) 4  -NW 4  -RILEY    Moving to and from a bed to a chair (including a wheelchair)? 3  -MIMI (r) KE (t) MIMI (c) 3  -NW 3  -RILEY    Standing up from a chair using your arms (e.g., wheelchair, bedside chair)? 3  -MIMI (r) KE (t) MIMI (c) 3  -NW 3  -RILEY    Climbing 3-5 steps with a railing? 3  -MIMI (r) KE (t) MIMI (c) 3  -NW 3  -RILEY    To walk in hospital room? 3  -MIMI (r) KE (t) MIMI (c) 3  -NW 3  -RILEY    AM-PAC 6 Clicks Score 20  -MIMI (r) KE (t) 20  -NW 20  -RILEY       Functional Assessment    Outcome Measure Options AM-PAC 6 Clicks Basic Mobility (PT)  -MIMI (r) KE (t) MIMI (c) AM-PAC 6 Clicks Basic Mobility (PT)  -NW AM-PAC 6 Clicks Basic Mobility (PT)  -RILEY      User Key  (r) = Recorded By, (t) = Taken By, (c) = Cosigned By    Initials Name Provider Type    Bam Krishnamurthy, PT DPT Physical Therapist    Mary Ann Headley, PTA Physical Therapy Assistant    Brennan Vargas, PTA Physical Therapy Assistant    Aurea Isaac, PT Student PT Student          Time Calculation:         Time Calculation- OT     Row Name 09/11/18 1611             Time Calculation- OT    OT Start Time 1300  -MW      OT Stop Time 1345  -MW      OT Time Calculation (min) 45 min  -MW      OT Received On 09/11/18  -MW      OT Goal Re-Cert Due Date 09/21/18  -MW        User Key  (r) =  Recorded By, (t) = Taken By, (c) = Cosigned By    Initials Name Provider Type     Mariama Dwyer, OTR/L Occupational Therapist        Therapy Suggested Charges     Code   Minutes Charges    None           Therapy Charges for Today     Code Description Service Date Service Provider Modifiers Qty    [id number]  OT RE-EVAL 2 9/11/2018 Mariama Dwyer OTR/L CHASIDY KX 1    [id number]  OT SELF CARE/MGMT/TRAIN EA 15 MIN 9/11/2018 Mariama Dwyer OTR/L CHASIDY KX 1          OT G-codes  OT Professional Judgement Used?: Yes  OT Functional Scales Options: AM-PAC 6 Clicks Daily Activity (OT)  Score: 18  Functional Limitation: Self care  Self Care Current Status (): At least 40 percent but less than 60 percent impaired, limited or restricted  Self Care Goal Status (): At least 20 percent but less than 40 percent impaired, limited or restricted    KALIE Manrique/MERCEDEZ  9/11/2018

## 2018-09-11 NOTE — PLAN OF CARE
Problem: Patient Care Overview  Goal: Plan of Care Review   09/11/18 1400   Coping/Psychosocial   Plan of Care Reviewed With patient   Plan of Care Review   Progress improving   OTHER   Outcome Summary Prior to this episode, pt was independent with all ADLs, gait, and transfers. Pt currently requires CGA for bed mobility and transfers and CGA/Charl[place] for ambulation. Pt demonstrates impaired dynamic sitting balance and a lack of safety awareness, as well as L sided gait deviations. Recommend pt continue skilled PT to improve balance in order to be able to perform ADLs and to improve gait impairments and safety awareness to decrease risk of falling. Recommend pt d/c to IRF.

## 2018-09-11 NOTE — PLAN OF CARE
Problem: Patient Care Overview  Goal: Plan of Care Review  Outcome: Ongoing (interventions implemented as appropriate)   09/11/18 5647   Coping/Psychosocial   Plan of Care Reviewed With patient   Plan of Care Review   Progress no change   OTHER   Outcome Summary Pt to room 354 from ICU. pt A&Oxx4, neuro's intact. pt wanting to not be bothered to rest. pt slightly agitated, stating that loud voices making his head hurt and sighing with anything that is done to him. Pt reassured that will be left alone to rest after my job is complete w him.       Problem: Confusion, Acute (Adult)  Goal: Cognitive/Functional Impairments Minimized  Outcome: Ongoing (interventions implemented as appropriate)    Goal: Safety  Outcome: Ongoing (interventions implemented as appropriate)      Problem: Fall Risk (Adult)  Goal: Absence of Fall  Outcome: Ongoing (interventions implemented as appropriate)      Problem: Pain, Acute (Adult)  Goal: Identify Related Risk Factors and Signs and Symptoms  Outcome: Outcome(s) achieved Date Met: 09/11/18

## 2018-09-12 NOTE — THERAPY TREATMENT NOTE
Acute Care - Physical Therapy Treatment Note  [facility]     Patient Name: Dwight Cobian  : 1940  MRN: [id number]  Today's Date: 2018  Onset of Illness/Injury or Date of Surgery: 18  Date of Referral to PT: 18  Referring Physician: Dr. Ervin     Admit Date: 2018    Visit Dx:    ICD-10-CM ICD-9-CM   1. Neoplasm, brain (CMS/HCC) D49.6 239.6   2. Impaired mobility Z74.09 799.89   3. Impaired mobility and ADLs Z74.09 799.89   4. Malignant neoplasm of brain (CMS/HCC) C71.9 191.9   5. 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   -MM     Reason Treatment Not Performed  -- other (see comments)  -MM     Existing Precautions/Restrictions fall   left side weakness, decrease left awareness  -2  --     Recorded by [] Vicky Cosby, [facility] 18 1235  [AH2] Vicky Cosby, [facility] 18 1251 [MM] Nancy Andrea OTA 18 0959     Row Name 18 1109             Bed Mobility Assessment/Treatment    Supine-Sit [place] (Bed Mobility) contact guard;verbal cues  -      Sit-Supine [place] (Bed Mobility) --   chair  -AH      Recorded by [] Vicky Cosby, PTA 18 1251      Row Name 18 1100              Sit-Stand Transfer    Sit-Stand [place] (Transfers) minimum assist (75% patient effort);verbal cues   leans left and posterior  -AH      Recorded by [] Vicky Cosby, PTA 09/12/18 1251      Row Name 09/12/18 1109             Stand-Sit Transfer    Stand-Sit [place] (Transfers) contact guard;minimum assist (75% patient effort);verbal cues  -AH      Recorded by [] Vicky Cosby, PTA 09/12/18 1251      Row Name 09/12/18 1109             Toilet Transfer    [place] Level (Toilet Transfer) minimum assist (75% patient effort);verbal cues  -AH      Recorded by [] Vicky Cosby, PTA 09/12/18 1251      Row Name 09/12/18 1109             Gait/Stairs Assessment/Training    [place] Level (Gait) minimum assist (75% patient effort);moderate assist (50% patient effort);verbal cues  -      Assistive Device (Gait) --   HHA on left  -      Distance in Feet (Gait) 40   40 x 2  -AH      Left Sided Gait Deviations leans left  -AH      Comment (Gait/Stairs) pt required vc's for awareness of objects on left  -AH      Recorded by [] Vicky Cosby, PTA 09/12/18 1251      Row Name 09/12/18 1109             Static Sitting Balance    Level of [place] (Unsupported Sitting, Static Balance) contact guard assist;minimal assist, 75% patient effort  -AH      Sitting Position (Unsupported Sitting, Static Balance) sitting edge of mat  -      Time Able to Maintain Position (Unsupported Sitting, Static Balance) more than 5 minutes  -      Comment (Unsupported Sitting, Static Balance) pt leans left and posterior,also worked on tapping each foot to target  -AH      Recorded by [] Vicky Cosby, PTA 09/12/18 1251      Row Name 09/12/18 1109             Standing Balance Activity    Activities Performed (Standing, Balance Training) --   orient to midline with mirror  -      Support Needed for Balance (Standing, Balance Training) uses both upper extremities for support;minimal external support for balance,  75% patient effort   parallel bars  -AH      Recorded by [] Vicky Cosby, [facility] 09/12/18 1251      Row Name 09/12/18 1109             Dynamic Balance Activity    Therapeutic Training Performed (Dynamic Balance) backward walking   forward amb in parellel bars with mirror  -AH      Support Needed for Balance (Dynamic Balance Training) uses both upper extremities for support;CGA;minimal external support for balance, 75% patient effort   parellel bars and mirror  -AH      Recorded by [] Vicky Cosby, [facility] 09/12/18 1251      Row Name 09/12/18 1109             Positioning and Restraints    Pre-Treatment Position in bed  -AH      Post Treatment Position chair  -AH      In Chair reclined;call light within reach;encouraged to call for assist;exit alarm on;notified nsg;with family/caregiver  -AH      Recorded by [] Vicky Cosby, [facility] 09/12/18 1251      Row Name 09/12/18 1109             Pain Assessment    Additional Documentation Pain Scale: FACES Pre/Post-Treatment (Group)  -AH      Recorded by [] Vicky Cosby, [facility] 09/12/18 1251      Row Name 09/12/18 1109             Pain Scale: Numbers Pre/Post-Treatment    Pain Location head  -AH      Recorded by [] Vicky Cosby, [facility] 09/12/18 1251      Row Name 09/12/18 1109             Pain Scale: FACES Pre/Post-Treatment    Pain: FACES Scale, Pretreatment 4-->hurts little more  -AH      Recorded by [] Vicky Cosby, [facility] 09/12/18 1251      Row Name                Wound 09/10/18 1602 Right head incision    Wound - Properties Group Date first assessed: 09/10/18 [MICHAELA] Time first assessed: 1602 [MICHAELA] Side: Right [MICHAELA] Location: head [MICHAELA] Type: incision [MICHAELA] Recorded by:  [MICHAELA] Fanny Obrien RN 09/10/18 1602      User Key  (r) = Recorded By, (t) = Taken By, (c) = Cosigned By    Initials Name Effective Dates Discipline     Vicky Cosby, [facility] 08/02/16 -  PT    Fanny Garcia RN 08/02/16 -  Nurse    Nancy Smith OTA 08/01/18 -  OT          Wound  09/10/18 1602 Right head incision (Active)   Dressing Appearance intact;dry 9/12/2018  8:00 AM   Closure JAGDISH 9/12/2018  8:00 AM   Drainage Amount none 9/12/2018  8:00 AM   Dressing Care, Wound gauze 9/12/2018  8:00 AM             Physical Therapy Education     Title: PT OT SLP Therapies (Done)     Topic: Physical Therapy (Done)     Point: Mobility training (Done)    Learning Progress Summary     Learner Status Readiness Method Response Comment Documented by    Patient Done Acceptance E VU,NR Pt educated on purpose of PT and POC. Pt educated on safety awareness when transferring and ambulating. Pt educated on gait mechanics.  09/11/18 1358     Active Acceptance E NR Educated on coordination and balance activities  09/09/18 0845     Done Eager E VU,NR Educated patient on improving heel strike  09/08/18 0936     Done Acceptance E,D DU,VU benefits of PT and POC, call for assist OOB  09/07/18 1122    Family Done Acceptance E,D DU,VU benefits of PT and POC, call for assist [facility] 09/07/18 1122          Point: Precautions (Done)    Learning Progress Summary     Learner Status Readiness Method Response Comment Documented by    Patient Done Acceptance E,TB VU left side awareness  09/12/18 1251     Done Acceptance E VU,NR Pt educated on purpose of PT and POC. Pt educated on safety awareness when transferring and ambulating. Pt educated on gait mechanics.  09/11/18 1358     Done Acceptance E,D DU,VU benefits of PT and POC, call for assist [facility] 09/07/18 1122    Family Done Acceptance E,D DU,VU benefits of PT and POC, call for assist [facility] 09/07/18 1122                      User Key     Initials Effective Dates Name Provider Type Discipline     08/02/16 -  Vicky Cosby, PTA Physical Therapy Assistant PT     08/02/16 -  Jose Medrano, PT Physical Therapist PT     08/02/16 -  Brennan Nolasco, PTA Physical Therapy Assistant PT     07/30/18 -  Aurea Palacio, PT Student PT Student PT                     PT Recommendation and Plan     Plan of Care Reviewed With: patient  Progress: no change  Outcome Summary: pt requesting to go to bathroom, trans to EOB cga with vc's, sit-stand min assist,pt leans left,min assist to keep pt midline, amb 40 feet HHA  on left  min assist, pt required vc's to avoid objects on left,, worked in parallel bars with mirror to focus on amb in midline . also worked on sitting balance on mat table, also had pt tapping each toe/heel to target. amb 40 feet back to room min assist, pt would benefit from acute rehab          Outcome Measures     Row Name 09/12/18 1300 09/11/18 1300 09/10/18 1000       How much help from another person do you currently need...    Turning from your back to your side while in flat bed without using bedrails? 4  - 4  -MIMI (r) KE (t) MIMI (c) 4  -NW    Moving from lying on back to sitting on the side of a flat bed without bedrails? 3  - 4  -MIMI (r) KE (t) MIMI (c) 4  -NW    Moving to and from a bed to a chair (including a wheelchair)? 3  - 3  -MIMI (r) KE (t) MIMI (c) 3  -NW    Standing up from a chair using your arms (e.g., wheelchair, bedside chair)? 3  - 3  -MIMI (r) KE (t) MIMI (c) 3  -NW    Climbing 3-5 steps with a railing? 2  - 3  -MIMI (r) KE (t) MIMI (c) 3  -NW    To walk in hospital room? 3  - 3  -MIMI (r) KE (t) MIMI (c) 3  -NW    AM-PAC 6 Clicks Score 18  - 20  -MIMI (r) KE (t) 20  -NW       Functional Assessment    Outcome Measure Options  -- AM-PAC 6 Clicks Basic Mobility (PT)  -MIMI (r) KE (t) MIMI (c) AM-PAC 6 Clicks Basic Mobility (PT)  -NW      User Key  (r) = Recorded By, (t) = Taken By, (c) = Cosigned By    Initials Name Provider Type     Vicky Cosby, PTA Physical Therapy Assistant    Bam Krishnamurthy, PT DPT Physical Therapist    Mary Ann Headley, PTA Physical Therapy Assistant    Aurea Isaac, PT Student PT Student           Time Calculation:         PT Charges     Row Name 09/12/18 4748             Time Calculation    Start Time  1109  -      Stop Time 1139  -      Time Calculation (min) 30 min  -      PT Received On 09/12/18  -      PT Goal Re-Cert Due Date 09/21/18  -         Time Calculation- PT    Total Timed Code Minutes- PT 30 minute(s)  -         Timed Charges    17021 - Gait Training Minutes  15  -AH      65583 - PT Therapeutic Activity Minutes 15  -        User Key  (r) = Recorded By, (t) = Taken By, (c) = Cosigned By    Initials Name Provider Type     Vicky Cosby PTA Physical Therapy Assistant        Therapy Suggested Charges     Code   Minutes Charges    29727 (CPT®) Hc Pt Neuromusc Re Education Ea 15 Min      62241 (CPT®) Hc Pt Ther Proc Ea 15 Min      42637 (CPT®) Hc Gait Training Ea 15 Min 15 1    46340 (CPT®) Hc Pt Therapeutic Act Ea 15 Min 15 1    13324 (CPT®) Hc Pt Manual Therapy Ea 15 Min      04062 (CPT®) Hc Pt Iontophoresis Ea 15 Min      33775 (CPT®) Hc Pt Elec Stim Ea-Per 15 Min      56263 (CPT®) Hc Pt Ultrasound Ea 15 Min      91097 (CPT®) Hc Pt Self Care/Mgmt/Train Ea 15 Min      62658 (CPT®) Hc Pt Prosthetic (S) Train Initial Encounter, Each 15 Min      07093 (CPT®) Hc Pt Orthotic(S)/Prosthetic(S) Encounter, Each 15 Min      81682 (CPT®) Hc Orthotic(S) Mgmt/Train Initial Encounter, Each 15min      Total  30 2        Therapy Charges for Today     Code Description Service Date Service Provider Modifiers Qty    44258083018 HC GAIT TRAINING EA 15 MIN 9/12/2018 Vicky Cosby, PTA GP, KX 1    24802753961 HC PT THERAPEUTIC ACT EA 15 MIN 9/12/2018 Vicky Cosby PTA GP, KX 1          PT G-Codes  PT Professional Judgement Used?: Yes  Outcome Measure Options: AM-PAC 6 Clicks Basic Mobility (PT)  AM-PAC 6 Clicks Score: 18  Score: 18  Functional Limitation: Mobility: Walking and moving around  Mobility: Walking and Moving Around Current Status (): At least 20 percent but less than 40 percent impaired, limited or restricted  Mobility: Walking and Moving Around Goal Status (): At least 1 percent  but less than 20 percent impaired, limited or restricted    Vicky Cosby, PTA  9/12/2018

## 2018-09-12 NOTE — PROGRESS NOTES
[facility] Medicine Services  INPATIENT PROGRESS NOTE    Patient Name: Dwight Cobian  Date of Admission: 9/6/2018  Today's Date: 09/12/18  Length of Stay: 6  Primary Care Physician: Saurav Lima MD    Subjective   Chief Complaint: brain tumor    HPI   Currently sitting up in bed.  Trying to take a nap, slight headache.  Family at bedside.  Wife currently gone.  Steroids being weaned by NSG.  Doing well.  Denies any needs.      Review of Systems   Constitutional: Positive for activity change and fatigue. Negative for appetite change, chills and fever.   HENT: Negative for hearing loss, nosebleeds, tinnitus and trouble swallowing.    Eyes: Negative for visual disturbance.   Respiratory: Negative for cough, chest tightness, shortness of breath and wheezing.    Cardiovascular: Negative for chest pain, palpitations and leg swelling.   Gastrointestinal: Negative for abdominal distention, abdominal pain, blood in stool, constipation, diarrhea, nausea and vomiting.   Endocrine: Negative for cold intolerance, heat intolerance, polydipsia, polyphagia and polyuria.   Genitourinary: Negative for decreased urine volume, difficulty urinating, dysuria, flank pain, frequency and hematuria.   Musculoskeletal: Negative for arthralgias, joint swelling and myalgias.   Skin: Negative for rash.   Allergic/Immunologic: Negative for immunocompromised state.   Neurological: Positive for weakness. Negative for dizziness, syncope, light-headedness and headaches.   Hematological: Negative for adenopathy. Does not bruise/bleed easily.   Psychiatric/Behavioral: Positive for confusion. Negative for sleep disturbance. The patient is not nervous/anxious.       All pertinent negatives and positives are as above. All other systems have been reviewed and are negative unless otherwise stated.     Objective    Temp:  [97.6 °F (36.4 °C)-98.2 °F (36.8 °C)] 97.9 °F (36.6 °C)  Heart Rate:  [] 59  Resp:  [16-22]  16  BP: (103-152)/(43-73) 145/56  Arterial Line BP: (116-149)/(40-78) 117/78     Physical Exam   Constitutional: He is oriented to person, place, and time. He appears well-developed and well-nourished.   Sitting up in bed. NAD.   HENT:   Head: Normocephalic and atraumatic.   Eyes: Pupils are equal, round, and reactive to light. Conjunctivae and EOM are normal.   Neck: Neck supple. No JVD present. No thyromegaly present.   Cardiovascular: Normal rate, regular rhythm, normal heart sounds and intact distal pulses.  Exam reveals no gallop and no friction rub.    No murmur heard.  Pulmonary/Chest: Effort normal and breath sounds normal. No respiratory distress. He has no wheezes. He has no rales. He exhibits no tenderness.   Abdominal: Soft. Bowel sounds are normal. He exhibits no distension. There is no tenderness. There is no rebound and no guarding.   Musculoskeletal: Normal range of motion. He exhibits no edema, tenderness or deformity.   Lymphadenopathy:     He has no cervical adenopathy.   Neurological: He is alert and oriented to person, place, and time. He displays normal reflexes. No cranial nerve deficit. He exhibits normal muscle tone.   Skin: Skin is warm and dry. No rash noted.   Kerlix wrapped around patients head   Psychiatric: He has a normal mood and affect. His behavior is normal. Judgment and thought content normal.   Nursing note and vitals reviewed.    Results Review:  I have reviewed the labs, radiology results, and diagnostic studies.    Laboratory Data:     Results from last 7 days  Lab Units 09/12/18  0403 09/11/18  0330 09/10/18  0729   WBC 10*3/mm3 12.01* 13.56* 9.75   HEMOGLOBIN g/dL 11.7* 11.7* 12.8*   HEMATOCRIT % 34.8* 34.2* 38.0*   PLATELETS 10*3/mm3 169 175 190       Results from last 7 days  Lab Units 09/12/18  0403 09/11/18  0330 09/10/18  0729  09/06/18  1308   SODIUM mmol/L 137 137 138  < > 135   POTASSIUM mmol/L 4.5 3.9 4.3  < > 4.6   CHLORIDE mmol/L 104 105 103  < > 98   CO2 mmol/L  25.0 24.0 27.0  < > 27.0   BUN mg/dL 18 20 24*  < > 22*   CREATININE mg/dL 0.88 0.90 0.90  < > 1.01   CALCIUM mg/dL 8.1* 7.9* 8.4  < > 9.1   BILIRUBIN mg/dL  --   --  0.5  --  0.5   ALK PHOS U/L  --   --  60  --  94   ALT (SGPT) U/L  --   --  34  --  29   AST (SGOT) U/L  --   --  24  --  27   GLUCOSE mg/dL 242* 185* 224*  < > 469*   < > = values in this interval not displayed.  Radiology Data:   Imaging Results (last 24 hours)     Procedure Component Value Units Date/Time    MRI Brain With & Without Contrast [[id number]] Collected:  09/11/18 1351     Updated:  09/11/18 1402    Narrative:       HISTORY: Status post craniotomy     MRI BRAIN: Multiplanar imaging the brain is performed pre- and post-IV  contrast.     COMPARISON: 9/6/2018     FINDINGS: There are right craniotomy changes. There is pneumocephalus  with greatest amount of air along the anterior right frontal lobe. There  are noted within the frontal horn of the right lateral ventricle. There  is a very small right subdural fluid, likely hemorrhage with surgery.  There is no midline shifting. Blood products are seen within the  operative cavity of the right posterior parietal lobe. There is residual  enhancement tumor seen circumferentially adjacent the operative cavity.  Tumor resected from the posterior medial aspect of the tumor.       Impression:       1. Status post craniotomy with partial resection of the enhancing tumor  of the right posterior parietal lobe. Residual enhancing tumor present  creating mass effect on the posterior horn right lateral ventricle.  Pneumocephalus and small right subdural collection related to recent  surgery. No midline shifting. Basilar cisterns remain visible.  This report was finalized on 09/11/2018 13:59 by Dr. Radha Whitehead MD.        I have reviewed the patient's current medications.     Assessment/Plan     Assessment:   1.  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  Type II diabetes, with hyperglycemia  3.  Gait disturbance  4.  Acute cognitive impairment  5.  Large cortical cyst involving the upper pole of the left kidney  6.  5 mm nodule in the left upper lobe     Plan:   1.  Continue Levemir 20 units nightly   2.  Glucoses: 163, 221, 242, 218.  3.  Keppra and Decadron continues, decreasing Decadron.   4.  Craniotomy 9/10/18 POD #2  5.  CBC and BMP in AM  6.  Continue high dose SSI   7.  Metformin 500 mg BID  8.  IV fluids discontinued   9.  PT/OT consult    Discharge Planning: Per attending.     ALURA Mcpherson   09/12/18   9:35 AM       Chart reviewed  Patient examined  Agree with assessment and plan.   Discussed with patient and LEONARDO Xiong DO  09/12/18  11:55 AM

## 2018-09-12 NOTE — PLAN OF CARE
Problem: Patient Care Overview  Goal: Plan of Care Review  Outcome: Ongoing (interventions implemented as appropriate)   09/12/18 4001   Coping/Psychosocial   Plan of Care Reviewed With patient   Plan of Care Review   Progress no change   OTHER   Outcome Summary pt A&Ox4, VSS. pt c/o head incisional pain4/10. Pt med x1 for pain. Pt voiding freq, PVR completed pt retaining 200 ml each time. MD notifed and instructed to cont. to bladder scan throughout night and reevaluate in the am. Pt drsg to incision site changed d/t drsg falling off.        Problem: Fall Risk (Adult)  Goal: Absence of Fall  Outcome: Ongoing (interventions implemented as appropriate)      Problem: Pain, Acute (Adult)  Goal: Acceptable Pain Control/Comfort Level  Outcome: Ongoing (interventions implemented as appropriate)

## 2018-09-12 NOTE — PLAN OF CARE
Problem: Patient Care Overview  Goal: Plan of Care Review  Outcome: Ongoing (interventions implemented as appropriate)   09/12/18 2674   Coping/Psychosocial   Plan of Care Reviewed With patient   Plan of Care Review   Progress improving   OTHER   Outcome Summary Pt up in chair; sister in room. Pt CGA for sit<>stands. Functional mobility in room and chair>bathroom>bed with CGA/Randall with HHA (L hand). Pt t/f to toilet with Randall due to decreased balance/sitting on commode sideways; physical assist to return pt to midline due to patient being unaware of L and posterior lean. Pt performed hygiene/clothing management with Randall for balance only. Performed hand washing at stink with Randall for balance. Pt t/f back to bed with CGA/HHA (L hand). Pt able to perform bed mobility with S, but recommend CGA when sitting EOB due to decreased balance. Pt sat EOB to don socks with CGA/Randall for balance when reaching. Donned gown with CGA. Educated pt on possible home modifications needed/safety awareness for falls. Pt demonstrated decreased awareness on the L during functional mobility, but during visual tracking activity, pt had no difficulty locating items placed on the L. Continue OT POC. Recommend inpatient rehab upon d/c to address balance deficits during ADL tasks.

## 2018-09-12 NOTE — PROGRESS NOTES
"Dwight Cobian  78 y.o.      Chief complaint:   Brain tumor    Subjective  No events    Temp:  [97.6 °F (36.4 °C)-98.2 °F (36.8 °C)] 97.7 °F (36.5 °C)  Heart Rate:  [] 71  Resp:  [16-22] 16  BP: (103-152)/(43-73) 152/55  Arterial Line BP: (116-170)/(38-78) 117/78      Objective:  General Appearance:  Comfortable, well-appearing, in no acute distress and not in pain.    Vital signs: (most recent): Blood pressure 152/55, pulse 71, temperature 97.7 °F (36.5 °C), temperature source Oral, resp. rate 16, height 182.9 cm (72\"), weight 79.5 kg (175 lb 3.2 oz), SpO2 96 %.  Vital signs are normal.  No fever.    Output: Producing urine.    Lungs:  Normal effort and normal respiratory rate.    Heart: Normal rate.  Regular rhythm.    Chest: Symmetric chest wall expansion.   Extremities: Normal range of motion.    Skin:  Warm and dry.        Neurologic Exam     Mental Status   Oriented to person, place, and time.   Attention: normal. Concentration: normal.   Speech: speech is normal   Level of consciousness: alert    Cranial Nerves   Cranial nerves II through XII intact.     CN III, IV, VI   Pupils are equal, round, and reactive to light.  Extraocular motions are normal.     Motor Exam   Muscle bulk: normal    Strength   Strength 5/5 throughout.     Sensory Exam   Light touch normal.       Principal Problem:    Malignant neoplasm of brain (CMS/HCC)  Active Problems:    Neoplasm, brain (CMS/HCC)      Lab Results (last 24 hours)     Procedure Component Value Units Date/Time    POC Glucose Once [[id number]]  (Abnormal) Collected:  09/12/18 0815    Specimen:  Blood Updated:  09/12/18 0831     Glucose 218 (H) mg/dL      Comment: : [id number] [employer] ID: [id number]       Basic Metabolic Panel [[id number]]  (Abnormal) Collected:  09/12/18 0403    Specimen:  Blood Updated:  09/12/18 0434     Glucose 242 (H) mg/dL      BUN 18 mg/dL      Creatinine 0.88 mg/dL      Sodium 137 mmol/L      Potassium 4.5 mmol/L      Chloride 104 " mmol/L      CO2 25.0 mmol/L      Calcium 8.1 (L) mg/dL      eGFR Non African Amer 84 mL/min/1.73      BUN/Creatinine Ratio 20.5     Anion Gap 8.0 mmol/L     Narrative:       The MDRD GFR formula is only valid for adults with stable renal function between ages 18 and 70.    CBC & Differential [[id number]] Collected:  09/12/18 0403    Specimen:  Blood Updated:  09/12/18 0421    Narrative:       The following orders were created for panel order CBC & Differential.  Procedure                               Abnormality         Status                     ---------                               -----------         ------                     CBC Auto Differential[[id number]]        Abnormal            Final result                 Please view results for these tests on the individual orders.    CBC Auto Differential [[id number]]  (Abnormal) Collected:  09/12/18 0403    Specimen:  Blood Updated:  09/12/18 0421     WBC 12.01 (H) 10*3/mm3      RBC 3.98 (L) 10*6/mm3      Hemoglobin 11.7 (L) g/dL      Hematocrit 34.8 (L) %      MCV 87.4 fL      MCH 29.4 pg      MCHC 33.6 g/dL      RDW 12.4 %      RDW-SD 39.7 (L) fl      MPV 11.4 fL      Platelets 169 10*3/mm3      Neutrophil % 90.5 (H) %      Lymphocyte % 3.1 (L) %      Monocyte % 5.9 %      Eosinophil % 0.0 %      Basophil % 0.1 %      Immature Grans % 0.4 %      Neutrophils, Absolute 10.87 (H) 10*3/mm3      Lymphocytes, Absolute 0.37 (L) 10*3/mm3      Monocytes, Absolute 0.71 10*3/mm3      Eosinophils, Absolute 0.00 10*3/mm3      Basophils, Absolute 0.01 10*3/mm3      Immature Grans, Absolute 0.05 (H) 10*3/mm3      nRBC 0.0 /100 WBC     POC Glucose Once [[id number]]  (Abnormal) Collected:  09/11/18 1944    Specimen:  Blood Updated:  09/11/18 1956     Glucose 221 (H) mg/dL      Comment: : [id number] [facility] [employer] ID: [id number]       POC Glucose Once [[id number]]  (Abnormal) Collected:  09/11/18 1707    Specimen:  Blood Updated:  09/11/18 1728     Glucose 163 (H) mg/dL       Comment: : [id number] Kevin Khanna ID: [id number]       Tissue Pathology Exam [[id number]] Collected:  09/10/18 1434    Specimen:  Tissue from Brain, Tissue from Brain Updated:  09/11/18 1245     Case Report --     Surgical Pathology Report                         Case: [id number]                                  Authorizing Provider:  Mika Ervin MD       Collected:           09/10/2018 02:34 PM          Ordering Location:     [facility] OR  Received:            09/10/2018 02:56 PM          Pathologist:           Anahi Zhang MD                                                          Intraop:               Anahi Zhang MD                                                          Specimen:    Brain, BRAIN TUMOR FOR FROZEN                                                               Intraoperative Consultation --         FROZEN SECTION DIAGNOSIS:   Brain, right tumor, biopsy:  Neoplastic tissue present  Reported to Dr Ervin on 9/10/2018 at 3:11 PM.      POC Glucose Once [[id number]]  (Abnormal) Collected:  09/11/18 1207    Specimen:  Blood Updated:  09/11/18 1219     Glucose 261 (H) mg/dL      Comment: : [id number] Adeel Machado ID: [id number]                 Plan:   Pt doing well. Continue with OT/ PT. Decrease steroids. Stop IVF.     [place], APRN

## 2018-09-12 NOTE — THERAPY TREATMENT NOTE
Acute Care - Occupational Therapy Treatment Note  [facility]     Patient Name: Dwight Cobian  : 1940  MRN: [id number]  Today's Date: 2018  Onset of Illness/Injury or Date of Surgery: 18  Date of Referral to OT: 09/10/18  Referring Physician: Dr. Ervin     Admit Date: 2018       ICD-10-CM ICD-9-CM   1. Neoplasm, brain (CMS/HCC) D49.6 239.6   2. Impaired mobility Z74.09 799.89   3. Impaired mobility and ADLs Z74.09 799.89   4. Malignant neoplasm of brain (CMS/HCC) C71.9 191.9   5. 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)   • Unsteady gait   • Forgetfulness   • Neoplasm, brain (CMS/HCC)   • Malignant neoplasm of brain (CMS/HCC)     Past Medical History:   Diagnosis Date   • Arthritis    • Diabetes mellitus (CMS/HCC)    • Fractured skull (CMS/HCC) 2016    garage door fell on him     Past Surgical History:   Procedure Laterality Date   • CRANIOTOMY FOR TUMOR Right 9/10/2018    Procedure: CRANIOTOMY FOR TUMOR RIGHT;  Surgeon: Mika Ervin MD;  Location: [facility];  Service: Neurosurgery       Therapy Treatment          Rehabilitation Treatment Summary     Row Name 18 1440 18 1109 18 0948       Treatment Time/Intention    Discipline occupational therapy assistant  -MM physical therapy assistant  - occupational therapy assistant  -MM    Document Type therapy note (daily note)  - therapy note (daily note)  -[facility]  --    Subjective Information complains of;pain   headache   -MM no complaints  -2  --    Mode of Treatment occupational therapy  -MM  --  --    Patient/Family Observations sister in room; pt asleep in chair   -MM  --  --    Care Plan Review care plan/treatment goals reviewed  -MM  --  --    Patient Effort good  -MM  --  --    Comment  --  -- patient's family reported pt has not been  resting well and just fell asleep; requested for OT to check back later; [facility] plans to see pt in p.m.   -MM    Reason Treatment Not Performed  --  -- other (see comments)  -MM    Existing Precautions/Restrictions fall  -MM fall   left side weakness, decrease left awareness  -2  --    Treatment Considerations/Comments leans to the left; decreased awareness of L side during functional mobility   -MM  --  --    Recorded by [MM] Nancy Andrea [facility] 09/12/18 1540 [] Vicky Cosby, PTA 09/12/18 1235  [2] Vicky Cosby, PTA 09/12/18 1251 [MM] Nancy Andrea [facility] 09/12/18 0959    Row Name 09/12/18 1440             Cognitive Assessment/Intervention- PT/OT    Affect/Mental Status (Cognitive) WFL  -MM      Orientation Status (Cognition) oriented x 4  -MM      Follows Commands (Cognition) WFL  -MM      Personal Safety Interventions elopement precautions initiated;fall prevention program maintained;gait belt;nonskid shoes/slippers when out of bed;supervised activity  -MM      Recorded by [MM] Nancy Andrea [facility] 09/12/18 1540      Row Name 09/12/18 1440             Safety Issues, Functional Mobility    Safety Issues Affecting Function (Mobility) insight into deficits/self awareness;safety precaution awareness  -MM      Impairments Affecting Function (Mobility) balance;endurance/activity tolerance;postural/trunk control;sensation/sensory awareness;strength  -MM      Comment, Safety Issues/Impairments (Mobility) leans to L; unsteady  -MM      Recorded by [MM] Nancy Andrea [facility] 09/12/18 1540      Row Name 09/12/18 1440 09/12/18 1109          Bed Mobility Assessment/Treatment    Bed Mobility Assessment/Treatment rolling left;rolling right;scooting/bridging;supine-sit;sit-supine  -MM  --     Rolling Left [place] (Bed Mobility) supervision  -MM  --     Rolling Right [place] (Bed Mobility) supervision  -MM  --     Scooting/Bridging [place] (Bed Mobility) supervision  -MM  --     Supine-Sit  [place] (Bed Mobility) supervision  -MM contact guard;verbal cues  -AH     Sit-Supine [place] (Bed Mobility) supervision  -MM --   chair  -AH     Bed Mobility, Safety Issues impaired trunk control for bed mobility  -MM  --     Assistive Device (Bed Mobility) bed rails;head of bed elevated  -MM  --     Recorded by [MM] aNncy Andrea OTA 09/12/18 1540 [] Vicky Cosby, PTA 09/12/18 1251     Row Name 09/12/18 1440             Functional Mobility    Functional Mobility- Ind. Level minimum assist (75% patient effort);moderate assist (50% patient effort);verbal cues required  -MM      Functional Mobility- Device other (see comments)   HHA on L side   -MM      Functional Mobility- Safety Issues step length decreased;balance decreased during turns  -MM      Functional Mobility- Comment in room; chair>bathroom>bed  -MM      Recorded by [MM] Nancy Andrea OTA 09/12/18 1540      Row Name 09/12/18 1440             Transfer Assessment/Treatment    Transfer Assessment/Treatment sit-stand transfer;stand-sit transfer;toilet transfer  -MM      Comment (Transfers) leans to L and posterior at times; unsteady upon standing; toilet t/f (pt sat sideways;cues to straighten up/come back to midline)  -MM      Recorded by [MM] Nancy Andrea OTA 09/12/18 1540      Row Name 09/12/18 1440 09/12/18 1109          Sit-Stand Transfer    Sit-Stand [place] (Transfers) minimum assist (75% patient effort);verbal cues  -MM minimum assist (75% patient effort);verbal cues   leans left and posterior  -     Recorded by [MM] Nancy Andrea OTA 09/12/18 1540 [AH] Vicky Cosby, PTA 09/12/18 1251     Row Name 09/12/18 1440 09/12/18 1109          Stand-Sit Transfer    Stand-Sit [place] (Transfers) minimum assist (75% patient effort);verbal cues  -MM contact guard;minimum assist (75% patient effort);verbal cues  -     Recorded by [MM] Nancy Andrea OTA 09/12/18 1540 [] Vicky Cosby, \[facility]\"" 09/12/18 1251      Row Name 09/12/18 1440 09/12/18 1109          Toilet Transfer    Type (Toilet Transfer) sit-stand;stand-sit  -MM  --     [place] Level (Toilet Transfer) minimum assist (75% patient effort);verbal cues  -MM minimum assist (75% patient effort);verbal cues  -AH     Assistive Device (Toilet Transfer) grab bars/safety frame  -MM  --     Recorded by [MM] Nancy Andrea OTA 09/12/18 1540 [] Vicky Cosby, [facility] 09/12/18 1251     Row Name 09/12/18 1109             Gait/Stairs Assessment/Training    [place] Level (Gait) minimum assist (75% patient effort);moderate assist (50% patient effort);verbal cues  -AH      Assistive Device (Gait) --   HHA on left  -AH      Distance in Feet (Gait) 40   40 x 2  -AH      Left Sided Gait Deviations leans left  -AH      Comment (Gait/Stairs) pt required vc's for awareness of objects on left  -AH      Recorded by [] Vicky Cosby, [facility] 09/12/18 1251      Row Name 09/12/18 1440             ADL Assessment/Intervention    BADL Assessment/Intervention upper body dressing;lower body dressing;grooming;toileting  -MM      Recorded by [MM] Nancy Andrea OTA 09/12/18 1540      Row Name 09/12/18 1440             Upper Body Dressing Assessment/Training    Upper Body Dressing [place] Level doff;don;set up;supervision   gown  -MM      Upper Body Dressing Position edge of bed sitting  -MM      Recorded by [MM] Nancy Andrea OTA 09/12/18 1540      Row Name 09/12/18 1440             Lower Body Dressing Assessment/Training    Lower Body Dressing [place] Level doff;don;socks;set up;contact guard assist  -MM      Lower Body Dressing Position edge of bed sitting  -MM      Comment (Lower Body Dressing) CGA due to unsteady sitting balance; able to bring LEs up to opposite knee  -MM      Recorded by [MM] Nancy Andrea OTA 09/12/18 1540      Row Name 09/12/18 1440             Grooming Assessment/Training    [place] Level (Grooming) wash face, hands;contact guard  assist;verbal cues  -MM      Grooming Position supported standing  -MM      Comment (Grooming) standing at sink; pt leaned over to sink, VCs to correct posture/lean to the L  -MM      Recorded by [MM] Nancy Andrea OTA 09/12/18 1540      Row Name 09/12/18 1440             Toileting Assessment/Training    [place] Level (Toileting) toileting skills;adjust/manage clothing;perform perineal hygiene;contact guard assist;minimum assist (75% patient effort)  -MM      Assistive Devices (Toileting) grab bar/safety frame  -MM      Toileting Position unsupported sitting;supported standing  -MM      Comment (Toileting) [place] for balance during standing and sitting aspect; due to L lean; VCs and tactile cues to correct, pt unable to come back to midline w/o physical assist   -MM      Recorded by [MM] Nancy Andrea OTA 09/12/18 1540      Row Name 09/12/18 1440             BADL Safety/Performance    Impairments, BADL Safety/Performance balance;cognition;endurance/activity tolerance;strength;trunk/postural control;visual/perceptual  -MM      Cognitive Impairments, BADL Safety/Performance awareness, need for assistance;insight into deficits/self awareness;safety precaution awareness  -MM      Skilled BADL Treatment/Intervention BADL process/adaptation training;cognitive/safety deficit modifications;energy conservation;environmental modifications;sensory/visual deficit compensatory training  -MM      Progress in BADL Status effort and initiation  -MM      Recorded by [MM] Nancy Andrea OTA 09/12/18 1540      Row Name 09/12/18 1109             Static Sitting Balance    Level of [place] (Unsupported Sitting, Static Balance) contact guard assist;minimal assist, 75% patient effort  -AH      Sitting Position (Unsupported Sitting, Static Balance) sitting edge of mat  -AH      Time Able to Maintain Position (Unsupported Sitting, Static Balance) more than 5 minutes  -AH      Comment (Unsupported Sitting, Static  Balance) pt leans left and posterior,also worked on tapping each foot to target  -AH      Recorded by [AH] Vicky Cosby, PTA 09/12/18 1251      Row Name 09/12/18 1440             Dynamic Sitting Balance    Level of [place], Reaches Outside Midline (Sitting, Dynamic Balance) minimal assist, 75% patient effort  -MM      Sitting Position, Reaches Outside Midline (Sitting, Dynamic Balance) other (see comments);sitting on edge of bed   toilet   -MM      Comment, Reaches Outside Midline (Sitting, Dynamic Balance) during toileting and dressing tasks; physical assist to return to midline  -MM      Recorded by [MM] Nancy Andrea OTA 09/12/18 1540      Row Name 09/12/18 1440             Dynamic Standing Balance    Level of [place], Reaches Outside Midline (Standing, Dynamic Balance) minimal assist, 75% patient effort  -MM      Time Able to Maintain Position, Reaches Outside Midline (Standing, Dynamic Balance) 30 to 45 seconds  -MM      Comment, Reaches Outside Midline (Standing, Dynamic Balance) when reaching for soap/washing hands; Randall to correct posture due to pt not noticing he was leaning to L/posterior  -MM      Recorded by [MM] Nancy Andrea, [facility] 09/12/18 1540      Row Name 09/12/18 1109             Standing Balance Activity    Activities Performed (Standing, Balance Training) --   orient to midline with mirror  -AH      Support Needed for Balance (Standing, Balance Training) uses both upper extremities for support;minimal external support for balance, 75% patient effort   parallel bars  -AH      Recorded by [AH] Vicky Cosby, [facility] 09/12/18 1251      Row Name 09/12/18 1109             Dynamic Balance Activity    Therapeutic Training Performed (Dynamic Balance) backward walking   forward amb in parellel bars with mirror  -AH      Support Needed for Balance (Dynamic Balance Training) uses both upper extremities for support;CGA;minimal external support for balance, 75% patient effort   parellel  bars and mirror  -AH      Recorded by [] Vicky Cosby, PTA 09/12/18 1251      Row Name 09/12/18 1440 09/12/18 1109          Positioning and Restraints    Pre-Treatment Position sitting in chair/recliner  -MM in bed  -AH     Post Treatment Position bed  -MM chair  -AH     In Bed fowlers;call light within reach;encouraged to call for assist;exit alarm on;with family/caregiver;side rails up x2;SCD pump applied  -MM  --     In Chair  -- reclined;call light within reach;encouraged to call for assist;exit alarm on;notified nsg;with family/caregiver  -AH     Recorded by [MM] Nancy Andrea OTA 09/12/18 1540 [] Vicky Cosby, PTA 09/12/18 1251     Row Name 09/12/18 1109             Pain Assessment    Additional Documentation Pain Scale: FACES Pre/Post-Treatment (Group)  -AH      Recorded by [] Vicky Cosby, PTA 09/12/18 1251      Row Name 09/12/18 1109             Pain Scale: Numbers Pre/Post-Treatment    Pain Location head  -AH      Recorded by [] Vicky Cosby, PTA 09/12/18 1251      Row Name 09/12/18 1440 09/12/18 1109          Pain Scale: FACES Pre/Post-Treatment    Pain: FACES Scale, Pretreatment 2-->hurts little bit  -MM 4-->hurts little more  -AH     Pain: FACES Scale, Post-Treatment 2-->hurts little bit  -MM  --     Pre/Post Treatment Pain Comment headache; stated it has not really went away since his surgery  -MM  --     Recorded by [MM] Nancy Andrea OTA 09/12/18 1540 [] Vicky Cosby, PTA 09/12/18 1251     Row Name                Wound 09/10/18 1602 Right head incision    Wound - Properties Group Date first assessed: 09/10/18 [MICHAELA] Time first assessed: 1602 [MICHAELA] Side: Right [MICHAELA] Location: head [MICHAELA] Type: incision [MICHAELA] Recorded by:  [MICHAELA] Fanny Obrien RN 09/10/18 1602    Row Name 09/12/18 1440             Plan of Care Review    Plan of Care Reviewed With patient;sibling  -MM      Recorded by [MM] Nancy Andrea, JAROD 09/12/18 1540      Row Name 09/12/18 1440             Outcome  Summary/Treatment Plan (OT)    Daily Summary of Progress (OT) progress towards functional goals is fair  -MM      Recorded by [MM] Nancy Andrea OTA 09/12/18 1540        User Key  (r) = Recorded By, (t) = Taken By, (c) = Cosigned By    Initials Name Effective Dates Discipline    Vicky Gutierrez, PTA 08/02/16 -  PT    Fanny Garcia RN 08/02/16 -  Nurse    MM Nancy Andrea OTA 08/01/18 -  OT        Wound 09/10/18 1602 Right head incision (Active)   Dressing Appearance intact;dry 9/12/2018  8:00 AM   Closure JAGDISH 9/12/2018  8:00 AM   Drainage Amount none 9/12/2018  8:00 AM   Dressing Care, Wound gauze 9/12/2018  8:00 AM         Occupational Therapy Education     Title: PT OT SLP Therapies (Done)     Topic: Occupational Therapy (Done)     Point: ADL training (Done)     Description: Instruct learner(s) on proper safety adaptation and remediation techniques during self care or transfers.   Instruct in proper use of assistive devices.   Learning Progress Summary     Learner Status Readiness Method Response Comment Documented by    Patient Done Acceptance E,TB VU OT POC, benefits of activity, inpatient rehab/reasons for continued therapy, ADL retraining, dynamic balance training, t/f training  09/12/18 1541     Done Acceptance E VU safery awareness, balance, ADL, OT POC  09/11/18 1611     Done Acceptance E VU,DU,NR Pt and family educated regarding OT POC, fall precautions, purpose/use of a gait belt, and body mechanics to increase safety/independence in completing ADL's.  09/07/18 1359          Point: Precautions (Done)     Description: Instruct learner(s) on prescribed precautions during self-care and functional transfers.   Learning Progress Summary     Learner Status Readiness Method Response Comment Documented by    Patient Done Acceptance E,TB VU OT POC, benefits of activity, inpatient rehab/reasons for continued therapy, ADL retraining, dynamic balance training, t/f training  09/12/18 7027      Done Acceptance E VU safery awareness, balance, ADL, OT POC  09/11/18 1611     Done Acceptance E VU,DU,NR Pt and family educated regarding OT POC, fall precautions, purpose/use of a gait belt, and body mechanics to increase safety/independence in completing ADL's.  09/07/18 1359          Point: Body mechanics (Done)     Description: Instruct learner(s) on proper positioning and spine alignment during self-care, functional mobility activities and/or exercises.   Learning Progress Summary     Learner Status Readiness Method Response Comment Documented by    Patient Done Acceptance E,TB VU OT POC, benefits of activity, inpatient rehab/reasons for continued therapy, ADL retraining, dynamic balance training, t/f training  09/12/18 1541     Done Acceptance E VU safery awareness, balance, ADL, OT POC  09/11/18 1611     Done Acceptance E VU,DU,NR Pt and family educated regarding OT POC, fall precautions, purpose/use of a gait belt, and body mechanics to increase safety/independence in completing ADL's.  09/07/18 1359                      User Key     Initials Effective Dates Name Provider Type Discipline     08/28/18 -  Mariama Dwyer, OTR/L Occupational Therapist OT     07/03/18 -  Kirstie Hernandez OT Student OT Student OT     08/01/18 -  Nancy Andrea OTA Occupational Therapy Assistant OT                OT Recommendation and Plan  Outcome Summary/Treatment Plan (OT)  Daily Summary of Progress (OT): progress towards functional goals is fair  Daily Summary of Progress (OT): progress towards functional goals is fair  Plan of Care Review  Plan of Care Reviewed With: patient  Plan of Care Reviewed With: patient  Outcome Summary: Pt up in chair; sister in room. Pt CGA for sit<>stands. Functional mobility in room and chair>bathroom>bed with CGA/Randall with HHA (L hand). Pt t/f to toilet with Randall due to decreased balance/sitting on commode sideways; physical assist to return pt to midline due to  patient being unaware of L and posterior lean. Pt performed hygiene/clothing management with Randall for balance only. Performed hand washing at stink with Randall for balance. Pt t/f back to bed with CGA/HHA (L hand). Pt able to perform bed mobility with S, but recommend CGA when sitting EOB due to decreased balance. Pt sat EOB to don socks with CGA/Randall for balance when reaching. Donned gown with CGA. Educated pt on possible home modifications needed/safety awareness for falls. Pt demonstrated decreased awareness on the L during functional mobility, but during visual tracking activity, pt had no difficulty locating items placed on the L. Continue OT POC. Recommend inpatient rehab to address balance deficits during ADL tasks.         Outcome Measures     Row Name 09/12/18 1440 09/12/18 1300 09/11/18 1300       How much help from another person do you currently need...    Turning from your back to your side while in flat bed without using bedrails?  -- 4  - 4  -MIMI (r) KE (t) MIMI (c)    Moving from lying on back to sitting on the side of a flat bed without bedrails?  -- 3  - 4  -MIMI (r) KE (t) MIMI (c)    Moving to and from a bed to a chair (including a wheelchair)?  -- 3  - 3  -MIMI (r) KE (t) MIMI (c)    Standing up from a chair using your arms (e.g., wheelchair, bedside chair)?  -- 3  - 3  -MIMI (r) KE (t) MIMI (c)    Climbing 3-5 steps with a railing?  -- 2  - 3  -MIMI (r) KE (t) MIMI (c)    To walk in hospital room?  -- 3  - 3  -MIMI (r) KE (t) MIMI (c)    AM-PAC 6 Clicks Score  -- 18  - 20  -MIMI (r) KE (t)       How much help from another is currently needed...    Putting on and taking off regular lower body clothing? 3  -MM  --  --    Bathing (including washing, rinsing, and drying) 2  -MM  --  --    Toileting (which includes using toilet bed pan or urinal) 3  -MM  --  --    Putting on and taking off regular upper body clothing 3  -MM  --  --    Taking care of personal grooming (such as brushing teeth) 4  -MM  --  --     Eating meals 4  -MM  --  --    Score 19  -MM  --  --       Functional Assessment    Outcome Measure Options  --  -- AM-PAC 6 Clicks Basic Mobility (PT)  -MIMI (r) DEMOND (george) MIMI (melvin)    Row Name 09/10/18 1000             How much help from another person do you currently need...    Turning from your back to your side while in flat bed without using bedrails? 4  -NW      Moving from lying on back to sitting on the side of a flat bed without bedrails? 4  -NW      Moving to and from a bed to a chair (including a wheelchair)? 3  -NW      Standing up from a chair using your arms (e.g., wheelchair, bedside chair)? 3  -NW      Climbing 3-5 steps with a railing? 3  -NW      To walk in hospital room? 3  -NW      AM-PAC 6 Clicks Score 20  -NW         Functional Assessment    Outcome Measure Options AM-PAC 6 Clicks Basic Mobility (PT)  -NW        User Key  (r) = Recorded By, (t) = Taken By, (c) = Cosigned By    Initials Name Provider Type    Vicky Gutierrez, PTA Physical Therapy Assistant    Bam Krishnamurthy, PT DPT Physical Therapist    Mary Ann Headley, PTA Physical Therapy Assistant    Aurea Isaac, PT Student PT Student    MM Nancy Andrea OTA Occupational Therapy Assistant           Time Calculation:         Time Calculation- OT     Row Name 09/12/18 1440 09/12/18 1303          Time Calculation- OT    OT Start Time 1440  -MM  --     OT Stop Time 1520  -MM  --     OT Time Calculation (min) 40 min  -MM  --     Total Timed Code Minutes- OT 40 minute(s)  -MM  --     OT Received On 09/12/18  -MM  --        Timed Charges    52214 - Gait Training Minutes   -- 15  -AH     43403 - OT Therapeutic Activity Minutes 12  -MM  --     89167 - OT Self Care/Mgmt Minutes 28  -MM  --       User Key  (r) = Recorded By, (t) = Taken By, (c) = Cosigned By    Initials Name Provider Type    Vicky Gutierrez PTA Physical Therapy Assistant    Nancy Smith OTA Occupational Therapy Assistant           Therapy Suggested Charges      Code   Minutes Charges    73869 (CPT®) Hc Ot Neuromusc Re Education Ea 15 Min      49768 (CPT®) Hc Ot Ther Proc Ea 15 Min      52325 (CPT®) Hc Ot Therapeutic Act Ea 15 Min 12 1    51071 (CPT®) Hc Ot Manual Therapy Ea 15 Min      53252 (CPT®) Hc Ot Iontophoresis Ea 15 Min      03221 (CPT®) Hc Ot Elec Stim Ea-Per 15 Min      55038 (CPT®) Hc Ot Ultrasound Ea 15 Min      55025 (CPT®) Hc Ot Self Care/Mgmt/Train Ea 15 Min 28 2    Total  40 3        Therapy Charges for Today     Code Description Service Date Service Provider Modifiers Qty    15850018358 HC OT THERAPEUTIC ACT EA 15 MIN 9/12/2018 Nancy Andrea OTA GO, KX 1    18171304689 HC OT SELF CARE/MGMT/TRAIN EA 15 MIN 9/12/2018 Nancy Andrea OTA GO, KX 2          OT G-codes  OT Professional Judgement Used?: Yes  OT Functional Scales Options: AM-PAC 6 Clicks Daily Activity (OT)  Score: 18  Functional Limitation: Self care  Self Care Current Status (): At least 40 percent but less than 60 percent impaired, limited or restricted  Self Care Goal Status (): At least 20 percent but less than 40 percent impaired, limited or restricted    JAROD Waite  9/12/2018

## 2018-09-12 NOTE — PLAN OF CARE
Problem: Patient Care Overview  Goal: Plan of Care Review  Outcome: Ongoing (interventions implemented as appropriate)   09/12/18 0612   Coping/Psychosocial   Plan of Care Reviewed With patient   Plan of Care Review   Progress no change   OTHER   Outcome Summary pt requesting to go to bathroom, trans to EOB cga with vc's, sit-stand min assist,pt leans left,min assist to keep pt midline, amb 40 feet HHA on left min assist, pt required vc's to avoid objects on left,, worked in parallel bars with mirror to focus on amb in midline . also worked on sitting balance on mat table, also had pt tapping each toe/heel to target. amb 40 feet back to room min assist, pt would benefit from acute rehab

## 2018-09-12 NOTE — PLAN OF CARE
Problem: Patient Care Overview  Goal: Plan of Care Review  Outcome: Ongoing (interventions implemented as appropriate)   09/12/18 5659   Coping/Psychosocial   Plan of Care Reviewed With patient;spouse   Plan of Care Review   Progress improving   OTHER   Outcome Summary pt does c/o pain r/t to incision but rates it 5 at its highest. pt has been alert and oriented, no neuro decline has been noted, VSS, wife at bedside very helpful. dressing is c/d/i. left side noted to be slightly weaker. safety maintained.       Problem: Confusion, Acute (Adult)  Goal: Cognitive/Functional Impairments Minimized  Outcome: Outcome(s) achieved Date Met: 09/12/18    Goal: Safety  Outcome: Outcome(s) achieved Date Met: 09/12/18      Problem: Fall Risk (Adult)  Goal: Absence of Fall  Outcome: Ongoing (interventions implemented as appropriate)      Problem: Pain, Acute (Adult)  Goal: Acceptable Pain Control/Comfort Level  Outcome: Ongoing (interventions implemented as appropriate)

## 2018-09-13 NOTE — PLAN OF CARE
Problem: Patient Care Overview  Goal: Plan of Care Review  Outcome: Ongoing (interventions implemented as appropriate)   09/13/18 1120   Coping/Psychosocial   Plan of Care Reviewed With patient;spouse   Plan of Care Review   Progress improving   OTHER   Outcome Summary Pt in bed; wife in room; agreed to OT. Pt S for bed mobility. Sit<>stand with CGA. Functional mobility bed<>bathroom CGA/VCs for awareness of L side (would bump into wall/objects w/o cues). Pt t/f to toilet with CGA/Randall due to leaning to L. Pt performed hygiene/clothing management (SBA while seated/CGA when standing). Pt washed hands w/ CGA and no set up. Pt walked to closet in hallway; performed dynamic reaching task and GM/FM tasks (folding and stacking towels/cloths for 5 minutes (no LOB); CGA and VCs to maintain midline. Pt then performed simulated community mobility activity in [place] to couch. Educated on proper hand/foot placement when sitting/standing from low surfaces. Pt able to don/doff socks this session with SBA; Gown with S. Continue OT POC. Recommend inpatient rehab to continue skilled rehab to increase balance and independence in ADL tasks.

## 2018-09-13 NOTE — PROGRESS NOTES
Dwight Valadezph  78 y.o.      Chief complaint:   Brain tumor    Subjective  No complaints this morning.  Working with physical therapy yesterday.    Temp:  [97.7 °F (36.5 °C)-98.6 °F (37 °C)] 97.7 °F (36.5 °C)  Heart Rate:  [59-77] 64  Resp:  [16] 16  BP: (130-151)/(49-67) 140/60      Objective    Neurologic Exam     Mental Status   Oriented to person, place, and time.   Attention: normal.   Speech: speech is normal   Level of consciousness: alert  Knowledge: good.     Cranial Nerves   Cranial nerves II through XII intact.     Motor Exam   Muscle bulk: normal  Overall muscle tone: normal  Right arm pronator drift: absent  Left arm pronator drift: absent    Strength   Strength 5/5 throughout. Some left sided ataxia     Sensory Exam   Light touch normal.   Pinprick normal.     Gait, Coordination, and Reflexes     Gait  Gait: (unsteady)    Coordination   Finger to nose coordination: normal    Tremor   Resting tremor: absent  Intention tremor: absent  Action tremor: absent      Principal Problem:    Malignant neoplasm of brain (CMS/HCC)  Active Problems:    Neoplasm, brain (CMS/HCC)      Lab Results (last 24 hours)     Procedure Component Value Units Date/Time    Basic Metabolic Panel [[id number]]  (Abnormal) Collected:  09/13/18 0408    Specimen:  Blood Updated:  09/13/18 0439     Glucose 258 (H) mg/dL      BUN 20 mg/dL      Creatinine 0.88 mg/dL      Sodium 137 mmol/L      Potassium 4.3 mmol/L      Chloride 103 mmol/L      CO2 25.0 mmol/L      Calcium 8.4 mg/dL      eGFR Non African Amer 84 mL/min/1.73      BUN/Creatinine Ratio 22.7     Anion Gap 9.0 mmol/L     Narrative:       The MDRD GFR formula is only valid for adults with stable renal function between ages 18 and 70.    CBC (No Diff) [[id number]]  (Abnormal) Collected:  09/13/18 0408    Specimen:  Blood Updated:  09/13/18 0433     WBC 12.23 (H) 10*3/mm3      RBC 3.97 (L) 10*6/mm3      Hemoglobin 11.7 (L) g/dL      Hematocrit 34.5 (L) %      MCV 86.9 fL      MCH  29.5 pg      MCHC 33.9 g/dL      RDW 12.3 %      RDW-SD 39.2 (L) fl      MPV 11.5 fL      Platelets 164 10*3/mm3     POC Glucose Once [[id number]]  (Abnormal) Collected:  09/13/18 0323    Specimen:  Blood Updated:  09/13/18 0335     Glucose 326 (H) mg/dL      Comment: : [id number] Dedra [place]eter ID: [id number]       POC Glucose Once [[id number]]  (Abnormal) Collected:  09/12/18 2034    Specimen:  Blood Updated:  09/12/18 2231     Glucose 388 (H) mg/dL      Comment: : [id number] [employer] [place]eter ID: [id number]       POC Glucose Once [[id number]]  (Abnormal) Collected:  09/12/18 1648    Specimen:  Blood Updated:  09/12/18 2110     Glucose 263 (H) mg/dL      Comment: : [id number] Hard [place]eter ID: [id number]       POC Glucose Once [[id number]]  (Abnormal) Collected:  09/12/18 1218    Specimen:  Blood Updated:  09/12/18 1238     Glucose 339 (H) mg/dL      Comment: : [id number] Hard Kat[place]eter ID: [id number]       POC Glucose Once [[id number]]  (Abnormal) Collected:  09/12/18 0815    Specimen:  Blood Updated:  09/12/18 0831     Glucose 218 (H) mg/dL      Comment: : [id number] Hard Kat[place]eter ID: [id number]                 Plan:   Brain tumor. Stable, doing well with PT but  Unsteady ambulating. Considering acute rehab    Mika Ervin MD

## 2018-09-13 NOTE — THERAPY TREATMENT NOTE
Acute Care - Physical Therapy Treatment Note  [facility]     Patient Name: Dwight Cobian  : 1940  MRN: [id number]  Today's Date: 2018  Onset of Illness/Injury or Date of Surgery: 18  Date of Referral to PT: 18  Referring Physician: Dr. Ervin     Admit Date: 2018    Visit Dx:    ICD-10-CM ICD-9-CM   1. Neoplasm, brain (CMS/HCC) D49.6 239.6   2. Impaired mobility Z74.09 799.89   3. Impaired mobility and ADLs Z74.09 799.89   4. Malignant neoplasm of brain (CMS/HCC) C71.9 191.9   5. 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)   • Unsteady gait   • Forgetfulness   • Neoplasm, brain (CMS/HCC)   • Malignant neoplasm of brain (CMS/HCC)       Therapy Treatment          Rehabilitation Treatment Summary     Row Name 18             Treatment Time/Intention    Discipline physical therapy assistant  -KJ      Document Type therapy note (daily note)  -KJ      Subjective Information no complaints  -KJ      Mode of Treatment physical therapy  -KJ      Patient Effort good  -KJ      Existing Precautions/Restrictions fall  -KJ      Treatment Considerations/Comments pt reports dizziness is better today  -KJ      Recorded by [KJ] Karla Lozada, PTA 18      Row Name 18             Bed Mobility Assessment/Treatment    Supine-Sit [place] (Bed Mobility) independent  -KJ      Comment (Bed Mobility) sit patient in chair  -KJ      Recorded by [KJ] Karla Lozada, PTA 18      Row Name 1839             Sit-Stand Transfer    Sit-Stand [place] (Transfers) conditional independence  -KJ      Recorded by [KJ] Karla Lozada PTA 18      Row Name 18             Stand-Sit Transfer    Stand-Sit [place] (Transfers) independent  -KJ      Recorded by [KJ] Kierra  Karla NEWSOME, [facility] 09/13/18 0909      Row Name 09/13/18 0839             Gait/Stairs Assessment/Training    [place] Level (Gait) supervision  -KJ      Distance in Feet (Gait) 100' x 2  -KJ      Comment (Gait/Stairs) gait improved today, no dizziness during walking. No shift or lean in gait. No LOB  -KJ      Recorded by [KJ] Karla Lozada, [facility] 09/13/18 0909      Row Name 09/13/18 0839             Therapeutic Exercise    Exercise Type (Therapeutic Exercise) AROM (active range of motion)  -KJ      Position (Therapeutic Exercise) seated  -KJ      Sets/Reps (Therapeutic Exercise) 20  -KJ      Recorded by [KJ] Karla Lozada, [facility] 09/13/18 0909      Row Name 09/13/18 0839             Static Sitting Balance    Level of [place] (Unsupported Sitting, Static Balance) independent  -KJ      Sitting Position (Unsupported Sitting, Static Balance) long sitting on bed  -KJ      Time Able to Maintain Position (Unsupported Sitting, Static Balance) more than 5 minutes  -KJ      Recorded by [KJ] Karla Lozada, [facility] 09/13/18 0909      Row Name 09/13/18 0839             Dynamic Sitting Balance    Level of [place], Reaches Outside Midline (Sitting, Dynamic Balance) independent  -KJ      Sitting Position, Reaches Outside Midline (Sitting, Dynamic Balance) sitting on edge of bed  -KJ      Recorded by [KJ] Karla Lozada, [facility] 09/13/18 0909      Row Name 09/13/18 0839             Static Standing Balance    Level of [place] (Supported Standing, Static Balance) supervision  -KJ      Time Able to Maintain Position (Supported Standing, Static Balance) more than 5 minutes  -KJ      Recorded by [KJ] Karla Lozada, [facility] 09/13/18 0909      Row Name 09/13/18 0839             Dynamic Standing Balance    Level of [place], Reaches Outside Midline (Standing, Dynamic Balance) contact guard assist  -KJ      Recorded by [KJ] Karla Lozada, [facility] 09/13/18 0909      Row Name 09/13/18 0839             Dynamic Balance Activity     Therapeutic Training Performed (Dynamic Balance) ambulate backward;side stepping  -KJ      Recorded by [KJ] Karla Lozada, PTA 09/13/18 0909      Row Name 09/13/18 0839             Positioning and Restraints    Pre-Treatment Position in bed  -KJ      Post Treatment Position chair  -KJ      Recorded by [KJ] Karla Lozada, PTA 09/13/18 0909      Row Name 09/13/18 0839             Pain Scale: Numbers Pre/Post-Treatment    Pain Scale: Numbers, Pretreatment 0/10 - no pain  -KJ      Recorded by [KJ] Karla Lozada, PTA 09/13/18 0909      Row Name                Wound 09/10/18 1602 Right head incision    Wound - Properties Group Date first assessed: 09/10/18 [MICHAELA] Time first assessed: 1602 [MICHAELA] Side: Right [MICHAELA] Location: head [MICHAELA] Type: incision [MICHAELA] Recorded by:  [MICHAELA] Fanny Obrien RN 09/10/18 1602      User Key  (r) = Recorded By, (t) = Taken By, (c) = Cosigned By    Initials Name Effective Dates Discipline    KJ Karla Lozada PTA 08/02/16 -  PT    Fanny Garcia RN 08/02/16 -  Nurse          Wound 09/10/18 1602 Right head incision (Active)   Dressing Appearance intact;dry 9/12/2018  8:01 PM   Drainage Amount none 9/12/2018  8:01 PM   Dressing Care, Wound dressing changed 9/12/2018 11:58 PM             Physical Therapy Education     Title: PT OT SLP Therapies (Done)     Topic: Physical Therapy (Done)     Point: Mobility training (Done)    Learning Progress Summary     Learner Status Readiness Method Response Comment Documented by    Patient Done Acceptance ZAKI CULP Pt educated on purpose of PT and POC. Pt educated on safety awareness when transferring and ambulating. Pt educated on gait mechanics. KE 09/11/18 1358     Active Acceptance E NR Educated on coordination and balance activities RILEY 09/09/18 0845     Done Eager ZAKI CULP Educated patient on improving heel strike RLIEY 09/08/18 0936     Done Acceptance EJOSE VU benefits of PT and POC, call for assist OOB  09/07/18 1122    Family Done Acceptance ED  DU,VU benefits of PT and POC, [facility]          Point: Precautions (Done)    Learning Progress Summary     Learner Status Readiness Method Response Comment Documented by    Patient Done Acceptance E,TB VU left side awareness  09/12/18 1251     Done Acceptance E VU,NR Pt educated on purpose of PT and POC. Pt educated on safety awareness when transferring and ambulating. Pt educated on gait mechanics.  09/11/18 1358     Done Acceptance E,D DU,VU benefits of PT and POC, call for assist OOB  09/07/18 1122    Family Done Acceptance E,D DU,VU benefits of PT and POC, call for assist OOB  09/07/18 1122                      User Key     Initials Effective Dates Name Provider Type Discipline     08/02/16 -  Vicky Cosby, PTA Physical Therapy Assistant PT     08/02/16 -  Jose Medrano, PT Physical Therapist PT     08/02/16 -  Brennan Nolasco, PTA Physical Therapy Assistant PT     07/30/18 -  Aurea Palacio, PT Student PT Student PT                    PT Recommendation and Plan     Plan of Care Reviewed With: patient  Progress: improving  Outcome Summary: PT tx completed. Pt suppine in bed with no c/o. Denies any dizziness and states it has improved since yesterday. I bed mobility, CI/S transfers, and amb 100' x 2 SBA/S. Plan for discharge today.          Outcome Measures     Row Name 09/12/18 1440 09/12/18 1300 09/11/18 1300       How much help from another person do you currently need...    Turning from your back to your side while in flat bed without using bedrails?  — 4  - 4  -MIMI (r) KE (t) MIMI (c)    Moving from lying on back to sitting on the side of a flat bed without bedrails?  — 3  - 4  -MIMI (r) KE (t) MIMI (c)    Moving to and from a bed to a chair (including a wheelchair)?  — 3  - 3  -MIMI (r) KE (t) MIMI (c)    Standing up from a chair using your arms (e.g., wheelchair, bedside chair)?  — 3  - 3  -MIMI (r) KE (t) MIMI (c)    Climbing 3-5 steps with a railing?  — 2  - 3   -MIMI (r) KE (t) MIMI (c)    To walk in hospital room?  — 3  -AH 3  -MIMI (r) KE (t) MIMI (c)    AM-PAC 6 Clicks Score  — 18  -AH 20  -MIMI (r) KE (t)       How much help from another is currently needed...    Putting on and taking off regular lower body clothing? 3  -MM  —  —    Bathing (including washing, rinsing, and drying) 2  -MM  —  —    Toileting (which includes using toilet bed pan or urinal) 3  -MM  —  —    Putting on and taking off regular upper body clothing 3  -MM  —  —    Taking care of personal grooming (such as brushing teeth) 4  -MM  —  —    Eating meals 4  -MM  —  —    Score 19  -MM  —  —       Functional Assessment    Outcome Measure Options  —  — AM-PAC 6 Clicks Basic Mobility (PT)  -MIMI (r) KE (t) MIMI (c)    Row Name 09/10/18 1000             How much help from another person do you currently need...    Turning from your back to your side while in flat bed without using bedrails? 4  -NW      Moving from lying on back to sitting on the side of a flat bed without bedrails? 4  -NW      Moving to and from a bed to a chair (including a wheelchair)? 3  -NW      Standing up from a chair using your arms (e.g., wheelchair, bedside chair)? 3  -NW      Climbing 3-5 steps with a railing? 3  -NW      To walk in hospital room? 3  -NW      AM-PAC 6 Clicks Score 20  -NW         Functional Assessment    Outcome Measure Options AM-PAC 6 Clicks Basic Mobility (PT)  -NW        User Key  (r) = Recorded By, (t) = Taken By, (c) = Cosigned By    Initials Name Provider Type     Vicky Cosby, PTA Physical Therapy Assistant    Bam Krishnamurthy, PT DPT Physical Therapist    NW Mary Ann Stone, PTA Physical Therapy Assistant    Aurea Isaac, PT Student PT Student    MM Nancy Andrea OTA Occupational Therapy Assistant           Time Calculation:         PT Charges     Row Name 09/13/18 0839             Time Calculation    Start Time 0839  -KJ      Stop Time 0904  -KJ      Time Calculation (min) 25 min  -KJ      PT  Received On 09/13/18  -KJ      PT Goal Re-Cert Due Date 09/21/18  -KJ         Time Calculation- PT    Total Timed Code Minutes- PT 25 minute(s)  -KJ        User Key  (r) = Recorded By, (t) = Taken By, (c) = Cosigned By    Initials Name Provider Type    Karla Reynoso, PTA Physical Therapy Assistant        Therapy Suggested Charges     Code   Minutes Charges    78213 (CPT®) Hc Pt Neuromusc Re Education Ea 15 Min      95060 (CPT®) Hc Pt Ther Proc Ea 15 Min      77006 (CPT®) Hc Gait Training Ea 15 Min 15 1    28028 (CPT®) Hc Pt Therapeutic Act Ea 15 Min 15 1    47144 (CPT®) Hc Pt Manual Therapy Ea 15 Min      17275 (CPT®) Hc Pt Iontophoresis Ea 15 Min      59400 (CPT®) Hc Pt Elec Stim Ea-Per 15 Min      46983 (CPT®) Hc Pt Ultrasound Ea 15 Min      65981 (CPT®) Hc Pt Self Care/Mgmt/Train Ea 15 Min      45476 (CPT®) Hc Pt Prosthetic (S) Train Initial Encounter, Each 15 Min      85753 (CPT®) Hc Pt Orthotic(S)/Prosthetic(S) Encounter, Each 15 Min      50172 (CPT®) Hc Orthotic(S) Mgmt/Train Initial Encounter, Each 15min      Total  30 2        Therapy Charges for Today     Code Description Service Date Service Provider Modifiers Qty    26456651365 HC GAIT TRAINING EA 15 MIN 9/13/2018 Karla Lozada, PTA GP, KX 1    51703839638 HC PT THER PROC EA 15 MIN 9/13/2018 Karla Lozada, PTA GP, KX 1          PT G-Codes  PT Professional Judgement Used?: Yes  Outcome Measure Options: AM-PAC 6 Clicks Basic Mobility (PT)  AM-PAC 6 Clicks Score: 18  Score: 19  Functional Limitation: Mobility: Walking and moving around  Mobility: Walking and Moving Around Current Status (): At least 20 percent but less than 40 percent impaired, limited or restricted  Mobility: Walking and Moving Around Goal Status (): At least 1 percent but less than 20 percent impaired, limited or restricted    Karla Lozada PTA  9/13/2018

## 2018-09-13 NOTE — THERAPY TREATMENT NOTE
Acute Care - Occupational Therapy Treatment Note  [facility]     Patient Name: Dwight Cobian  : 1940  MRN: [id number]  Today's Date: 2018  Onset of Illness/Injury or Date of Surgery: 18  Date of Referral to OT: 09/10/18  Referring Physician: Dr. Ervin     Admit Date: 2018       ICD-10-CM ICD-9-CM   1. Neoplasm, brain (CMS/HCC) D49.6 239.6   2. Impaired mobility Z74.09 799.89   3. Impaired mobility and ADLs Z74.09 799.89   4. Malignant neoplasm of brain (CMS/HCC) C71.9 191.9   5. 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)   • Unsteady gait   • Forgetfulness   • Neoplasm, brain (CMS/HCC)   • Malignant neoplasm of brain (CMS/HCC)     Past Medical History:   Diagnosis Date   • Arthritis    • Diabetes mellitus (CMS/HCC)    • Fractured skull (CMS/HCC) 2016    garage door fell on him     Past Surgical History:   Procedure Laterality Date   • CRANIOTOMY FOR TUMOR Right 9/10/2018    Procedure: CRANIOTOMY FOR TUMOR RIGHT;  Surgeon: Mika Ervin MD;  Location: [facility];  Service: Neurosurgery       Therapy Treatment          Rehabilitation Treatment Summary     Row Name 18 1016 18 0839          Treatment Time/Intention    Discipline occupational therapy assistant  -MM physical therapy assistant  -KJ     Document Type therapy note (daily note)  -MM  --     Subjective Information no complaints  -MM  --     Mode of Treatment occupational therapy  -MM  --     Patient/Family Observations wife present   -MM  --     Patient Effort excellent  -MM  --     Comment decreased awareness to L side during functional mobility   -MM  --     Existing Precautions/Restrictions fall  -MM  --     Treatment Considerations/Comments leans to the L  -MM pt reports dizziness is better today  -KJ     Recorded by [MM] Nancy Andrea,  JAROD 09/13/18 1118 [KJ] Karla Lozada, PTA 09/13/18 0909     Row Name 09/13/18 1016             Safety Issues, Functional Mobility    Safety Issues Affecting Function (Mobility) insight into deficits/self awareness;safety precaution awareness  -MM      Impairments Affecting Function (Mobility) balance;endurance/activity tolerance;postural/trunk control  -MM      Comment, Safety Issues/Impairments (Mobility) leans to left; unsteady; VCs to return to midline   -MM      Recorded by [MM] Nancy Andrea OTA 09/13/18 1118      Row Name 09/13/18 1016             Bed Mobility Assessment/Treatment    Bed Mobility Assessment/Treatment bed mobility (all) activities  -MM      [place] Level (Bed Mobility) supervision;standby assist  -MM      Bed Mobility, Safety Issues impaired trunk control for bed mobility  -MM      Recorded by [MM] Nancy Andrea OTA 09/13/18 1118      Row Name 09/13/18 1016             Functional Mobility    Functional Mobility- Ind. Level verbal cues required;minimum assist (75% patient effort)  -MM      Functional Mobility- Safety Issues balance decreased during turns;step length decreased  -MM      Functional Mobility- Comment in room>bathroom; in hallway to closet; in hallway simulating community mobility activity   -MM      Recorded by [MM] Nancy Andrea OTA 09/13/18 1118      Row Name 09/13/18 1016             Transfer Assessment/Treatment    Transfer Assessment/Treatment sit-stand transfer;stand-sit transfer;toilet transfer  -MM      Comment (Transfers) leaning to the L; VCs to maintain/return to midline   -MM      Recorded by [MM] Nancy Andrea OTA 09/13/18 1118      Row Name 09/13/18 1016             Sit-Stand Transfer    Sit-Stand [place] (Transfers) contact guard  -MM      Recorded by [MM] Nancy Andrea OTA 09/13/18 1118      Row Name 09/13/18 1016             Stand-Sit Transfer    Stand-Sit [place] (Transfers) contact guard  -MM      Recorded by [MM]  Nancy Andrea OTA 09/13/18 1118      Row Name 09/13/18 1016             Toilet Transfer    Type (Toilet Transfer) sit-stand;stand-sit  -MM      [place] Level (Toilet Transfer) contact guard  -MM      Assistive Device (Toilet Transfer) grab bars/safety frame  -MM      Recorded by [MM] Nancy Andrea \[facility] [place] [facility]\"" 09/13/18 1118      Row Name 09/13/18 1016             ADL Assessment/Intervention    BADL Assessment/Intervention upper body dressing;lower body dressing;grooming;toileting  -MM      Recorded by [MM] Nancy Andrea OTA 09/13/18 1118      Row Name 09/13/18 1016             Upper Body Dressing Assessment/Training    Upper Body Dressing [place] Level doff;don;supervision   gown  -MM      Upper Body Dressing Position edge of bed sitting  -MM      Recorded by [MM] Nancy Andrea OTA 09/13/18 1118      Row Name 09/13/18 1016             Lower Body Dressing Assessment/Training    Lower Body Dressing [place] Level doff;don;socks;contact guard assist  -MM      Lower Body Dressing Position edge of bed sitting  -MM      Comment (Lower Body Dressing) CGA due to decreased sitting balance   -MM      Recorded by [MM] Nancy Andrea \[facility] [place] [facility]\"" 09/13/18 1118      Row Name 09/13/18 1016             Grooming Assessment/Training    [place] Level (Grooming) wash face, hands;contact guard assist  -MM      Grooming Position supported standing  -MM      Recorded by [MM] Nancy Andrea OTA 09/13/18 1118      Row Name 09/13/18 1016             Toileting Assessment/Training    [place] Level (Toileting) adjust/manage clothing;perform perineal hygiene   SBA  -MM      Toileting Position unsupported sitting;unsupported standing  -MM      Comment (Toileting) SBA for sitting aspect; CGA during standing   -MM      Recorded by [MM] Nancy Andrea OTA 09/13/18 1118      Row Name 09/13/18 1016             BADL Safety/Performance    Impairments, BADL Safety/Performance balance;endurance/activity  tolerance;trunk/postural control  -MM      Cognitive Impairments, BADL Safety/Performance awareness, need for assistance;insight into deficits/self awareness;safety precaution awareness  -MM      Skilled BADL Treatment/Intervention BADL process/adaptation training;environmental modifications  -MM      Progress in BADL Status independence level;effort and initiation  -MM      Recorded by [MM] Nancy Andrea OTA 09/13/18 1118      Row Name 09/13/18 1016             Dynamic Standing Balance    Level of [place], Reaches Outside Midline (Standing, Dynamic Balance) contact guard assist  -MM      Time Able to Maintain Position, Reaches Outside Midline (Standing, Dynamic Balance) 30 to 45 seconds  -MM      Comment, Reaches Outside Midline (Standing, Dynamic Balance) At closet reaching for towels/folding washcloths; working on GM and FM movements while outside of PAM. Pt required CGA and VCs to correct back to midline when leaning to the L   -MM      Recorded by [MM] Nancy Andrea OTA 09/13/18 1118      Row Name 09/13/18 1016 09/13/18 0839          Positioning and Restraints    Pre-Treatment Position in bed  -MM in bed  -KJ     Post Treatment Position bed  -MM chair  -KJ     In Bed fowlers;call light within reach;encouraged to call for assist;exit alarm on;with family/caregiver;side rails up x2;SCD pump applied  -MM  --     Recorded by [MM] Nancy Andrea OTA 09/13/18 1118 [KJ] Karla Lozada, [facility] 09/13/18 0909     Row Name 09/13/18 1016             Pain Scale: Numbers Pre/Post-Treatment    Pain Scale: Numbers, Pretreatment 0/10 - no pain  -MM      Pain Scale: Numbers, Post-Treatment 0/10 - no pain  -MM      Recorded by [MM] Nancy Andrea OTA 09/13/18 1118      Row Name                Wound 09/10/18 1602 Right head incision    Wound - Properties Group Date first assessed: 09/10/18 [MICHAELA] Time first assessed: 1602 [MICHAELA] Side: Right [MICHAELA] Location: head [MICHAELA] Type: incision [MICHAELA] Recorded by:  [MICHAELA] Micah  Fanny NEWSOME RN 09/10/18 1602    Row Name 09/13/18 1016             Plan of Care Review    Plan of Care Reviewed With patient;spouse  -MM      Recorded by [MM] Nancy Andrea OTA 09/13/18 1118      Row Name 09/13/18 1016             Outcome Summary/Treatment Plan (OT)    Daily Summary of Progress (OT) progress toward functional goals is good  -MM      Recorded by [MM] Nancy Andrea OTA 09/13/18 1118        User Key  (r) = Recorded By, (t) = Taken By, (c) = Cosigned By    Initials Name Effective Dates Discipline    Karla Reynoso, BELKIS 08/02/16 -  PT    Fanny Garcia RN 08/02/16 -  Nurse    MM Nancy Andrea OTA 08/01/18 -  OT        Wound 09/10/18 1602 Right head incision (Active)   Dressing Appearance intact;dry 9/12/2018  8:01 PM   Drainage Amount none 9/12/2018  8:01 PM   Dressing Care, Wound dressing changed 9/12/2018 11:58 PM         Occupational Therapy Education     Title: PT OT SLP Therapies (Done)     Topic: Occupational Therapy (Done)     Point: ADL training (Done)     Description: Instruct learner(s) on proper safety adaptation and remediation techniques during self care or transfers.   Instruct in proper use of assistive devices.   Learning Progress Summary     Learner Status Readiness Method Response Comment Documented by    Patient Done Acceptance E,TB VU t/f training, home mods, OT POC, dynamic reaching tasks, GM/FM tasks and benefits, benefits of activity  09/13/18 1119     Done Acceptance E,TB VU OT POC, benefits of activity, inpatient rehab/reasons for continued therapy, ADL retraining, dynamic balance training, t/f training  09/12/18 1541     Done Acceptance E VU safery awareness, balance, ADL, OT POC  09/11/18 1611     Done Acceptance E VU,DU,NR Pt and family educated regarding OT POC, fall precautions, purpose/use of a gait belt, and body mechanics to increase safety/independence in completing ADL's.  09/07/18 1359          Point: Precautions (Done)     Description:  Instruct learner(s) on prescribed precautions during self-care and functional transfers.   Learning Progress Summary     Learner Status Readiness Method Response Comment Documented by    Patient Done Acceptance E,TB VU t/f training, home mods, OT POC, dynamic reaching tasks, GM/FM tasks and benefits, benefits of activity  09/13/18 1119     Done Acceptance E,TB VU OT POC, benefits of activity, inpatient rehab/reasons for continued therapy, ADL retraining, dynamic balance training, t/f training  09/12/18 1541     Done Acceptance E VU safery awareness, balance, ADL, OT POC  09/11/18 1611     Done Acceptance E VU,DU,NR Pt and family educated regarding OT POC, fall precautions, purpose/use of a gait belt, and body mechanics to increase safety/independence in completing ADL's.  09/07/18 1353          Point: Body mechanics (Done)     Description: Instruct learner(s) on proper positioning and spine alignment during self-care, functional mobility activities and/or exercises.   Learning Progress Summary     Learner Status Readiness Method Response Comment Documented by    Patient Done Acceptance E,TB VU t/f training, home mods, OT POC, dynamic reaching tasks, GM/FM tasks and benefits, benefits of activity  09/13/18 1119     Done Acceptance E,TB VU OT POC, benefits of activity, inpatient rehab/reasons for continued therapy, ADL retraining, dynamic balance training, t/f training  09/12/18 1541     Done Acceptance E VU safery awareness, balance, ADL, OT POC  09/11/18 1611     Done Acceptance E VU,DU,NR Pt and family educated regarding OT POC, fall precautions, purpose/use of a gait belt, and body mechanics to increase safety/independence in completing ADL's.  09/07/18 135                      User Key     Initials Effective Dates Name Provider Type Discipline     08/28/18 -  Mariama Dwyer, OTR/L Occupational Therapist OT     07/03/18 -  Kirstie Hernandez, OT Student OT Student OT     08/01/18 -   Nancy Andrea OTA Occupational Therapy Assistant OT                OT Recommendation and Plan  Outcome Summary/Treatment Plan (OT)  Daily Summary of Progress (OT): progress toward functional goals is good  Daily Summary of Progress (OT): progress toward functional goals is good  Plan of Care Review  Plan of Care Reviewed With: patient, spouse  Plan of Care Reviewed With: patient, spouse  Outcome Summary: Pt in bed; wife in room; agreed to OT. Pt S for bed mobility. Sit<>stand with CGA. Functional mobility bed<>bathroom CGA/VCs for awareness of L side (would bump into wall/objects w/o cues). Pt t/f to toilet with CGA/[place] due to leaning to L. Pt performed hygiene/clothing management (SBA while seated/CGA when standing). Pt washed hands w/ CGA and no set up. Pt walked to closet in hallway; performed dynamic reaching task for 5 minutes (no LOB); CGA and VCs to maintain midline. Pt then performed simulated community mobility activity in [place] to couch. Educated on proper hand/foot placement when sitting/standing from low surfaces. Pt able to don/doff socks this session with SBA; Gown with S. Continue OT POC. Recommend inpatient rehab to continue skilled rehab to increase balance and independence in ADL tasks.         Outcome Measures     Row Name 09/13/18 1016 09/12/18 1440 09/12/18 1300       How much help from another person do you currently need...    Turning from your back to your side while in flat bed without using bedrails?  --  -- 4  -AH    Moving from lying on back to sitting on the side of a flat bed without bedrails?  --  -- 3  -AH    Moving to and from a bed to a chair (including a wheelchair)?  --  -- 3  -AH    Standing up from a chair using your arms (e.g., wheelchair, bedside chair)?  --  -- 3  -AH    Climbing 3-5 steps with a railing?  --  -- 2  -AH    To walk in hospital room?  --  -- 3  -AH    AM-PAC 6 Clicks Score  --  -- 18  -AH       How much help from another is currently needed...    Putting  on and taking off regular lower body clothing? 3  -MM 3  -MM  --    Bathing (including washing, rinsing, and drying) 3  -MM 2  -MM  --    Toileting (which includes using toilet bed pan or urinal) 3  -MM 3  -MM  --    Putting on and taking off regular upper body clothing 3  -MM 3  -MM  --    Taking care of personal grooming (such as brushing teeth) 4  -MM 4  -MM  --    Eating meals 4  -MM 4  -MM  --    Score 20  -MM 19  -MM  --    Row Name 09/11/18 1300             How much help from another person do you currently need...    Turning from your back to your side while in flat bed without using bedrails? 4  -MIMI (r) KE (t) MIMI (c)      Moving from lying on back to sitting on the side of a flat bed without bedrails? 4  -MIMI (r) KE (t) MIMI (c)      Moving to and from a bed to a chair (including a wheelchair)? 3  -MIMI (r) KE (t) MIMI (c)      Standing up from a chair using your arms (e.g., wheelchair, bedside chair)? 3  -MIMI (r) KE (t) MIMI (c)      Climbing 3-5 steps with a railing? 3  -MIMI (r) KE (t) MIMI (c)      To walk in hospital room? 3  -MIMI (r) KE (t) MIMI (c)      AM-PAC 6 Clicks Score 20  -MIMI (r) KE (t)         Functional Assessment    Outcome Measure Options AM-PAC 6 Clicks Basic Mobility (PT)  -MIMI (r) KE (t) MIMI (c)        User Key  (r) = Recorded By, (t) = Taken By, (c) = Cosigned By    Initials Name Provider Type    Vicky Gutierrez, PTA Physical Therapy Assistant    Bam Krishnamurthy, PT DPT Physical Therapist    Aurea Isaac, PT Student PT Student    Nancy Smith OTA Occupational Therapy Assistant           Time Calculation:         Time Calculation- OT     Row Name 09/13/18 1016             Time Calculation- OT    OT Start Time 1016  -MM      OT Stop Time 1059  -MM      OT Time Calculation (min) 43 min  -MM      Total Timed Code Minutes- OT 43 minute(s)  -MM      OT Received On 09/13/18  -MM         Timed Charges    49010 - OT Therapeutic Activity Minutes 14  -MM      39770 - OT Self Care/Mgmt Minutes  29  -MM        User Key  (r) = Recorded By, (t) = Taken By, (c) = Cosigned By    Initials Name Provider Type    MM Nancy Andrea OTA Occupational Therapy Assistant           Therapy Suggested Charges     Code   Minutes Charges    75824 (CPT®) Hc Ot Neuromusc Re Education Ea 15 Min      52355 (CPT®) Hc Ot Ther Proc Ea 15 Min      46826 (CPT®) Hc Ot Therapeutic Act Ea 15 Min 14 1    95888 (CPT®) Hc Ot Manual Therapy Ea 15 Min      80130 (CPT®) Hc Ot Iontophoresis Ea 15 Min      43938 (CPT®) Hc Ot Elec Stim Ea-Per 15 Min      55438 (CPT®) Hc Ot Ultrasound Ea 15 Min      76259 (CPT®) Hc Ot Self Care/Mgmt/Train Ea 15 Min 29 2    Total  43 3        Therapy Charges for Today     Code Description Service Date Service Provider Modifiers Qty    87059213294 HC OT THERAPEUTIC ACT EA 15 MIN 9/12/2018 Nancy Andrea OTA GO, KX 1    51238909034 HC OT SELF CARE/MGMT/TRAIN EA 15 MIN 9/12/2018 Nnacy Andrea OTA GO KX 2    10448468427 HC OT THERAPEUTIC ACT EA 15 MIN 9/13/2018 Nancy Andrea OTA GO KX 1    12388295473 HC OT SELF CARE/MGMT/TRAIN EA 15 MIN 9/13/2018 Nancy Andrea OTA GO KX 2          OT G-codes  OT Professional Judgement Used?: Yes  OT Functional Scales Options: AM-PAC 6 Clicks Daily Activity (OT)  Score: 18  Functional Limitation: Self care  Self Care Current Status (): At least 40 percent but less than 60 percent impaired, limited or restricted  Self Care Goal Status (): At least 20 percent but less than 40 percent impaired, limited or restricted    JAROD Waite  9/13/2018

## 2018-09-13 NOTE — PLAN OF CARE
Problem: Patient Care Overview  Goal: Plan of Care Review  Outcome: Ongoing (interventions implemented as appropriate)   09/13/18 6414   Coping/Psychosocial   Plan of Care Reviewed With patient   Plan of Care Review   Progress improving   OTHER   Outcome Summary Pt answers questions appropriately, but is forgetful at times, slightly confused. Incisional pain well controlled with ordered meds. Incision well approximated, sutures intact. Worked with therapy. Up to BR with assist x1. Gait unsteady.       Problem: Fall Risk (Adult)  Goal: Absence of Fall  Outcome: Ongoing (interventions implemented as appropriate)      Problem: Pain, Acute (Adult)  Goal: Acceptable Pain Control/Comfort Level  Outcome: Ongoing (interventions implemented as appropriate)

## 2018-09-13 NOTE — PLAN OF CARE
Problem: Patient Care Overview  Goal: Plan of Care Review  Outcome: Ongoing (interventions implemented as appropriate)   09/13/18 4527   Coping/Psychosocial   Plan of Care Reviewed With patient;spouse   Plan of Care Review   Progress no change   OTHER   Outcome Summary Pt asleep at time of visit, spoke with pt's wife. Wife reports pt has a good appetite. States at home they try to follow a DM diet (A1c 10.9) although wife reports pt loves his potatoes. Attempted some DM education, pt may benefit from further education when more appropriate. Provided contact information if questions were to arise, will continue to follow.

## 2018-09-13 NOTE — PROGRESS NOTES
Continued Stay Note   [place]     Patient Name: Dwight Cobian  MRN: [id number]  Today's Date: 9/13/2018    Admit Date: 9/6/2018          Discharge Plan     Row Name 09/13/18 1128       Plan    Plan Referral made to [facility] after speaking with wife, Abril for permission to make referral. BOAZ spoke with Naveed and information faxed as requested. pending eval and bed offer. Precert will be required if bed is offered.               Discharge Codes    No documentation.           Diamond Chen RN

## 2018-09-13 NOTE — PROGRESS NOTES
Spoke with wife of patient this morning. She wants to move forward with referral to [facility]. She says at this time she is not able to assist him safely in the home. Patient is not completely on board with idea but is willing to go if bed is offered. Precert will be necessary if bed offered. SW notified of decision to proceed with referral.   BOAZ spoke with Naveed and faxed face sheet to [facility] as requested.

## 2018-09-13 NOTE — PROGRESS NOTES
Continued Stay Note   [place]     Patient Name: Dwight Cobian  MRN: [id number]  Today's Date: 9/13/2018    Admit Date: 9/6/2018          Discharge Plan     Row Name 09/13/18 1156       Plan    Plan Comments PRECERT HAS BEEN STARTED FOR [facility]. WILL FOLLOW PENDING INSURANCE DECISION.     Row Name 09/13/18 1128       Plan    Plan Referral made to [facility] after speaking with wife, Abril for permission to make referral. BOAZ spoke with Naveed and information faxed as requested. pending eval and bed offer. Precert will be required if bed is offered.               Discharge Codes    No documentation.           SOPHIE Taylor

## 2018-09-13 NOTE — PLAN OF CARE
Problem: Patient Care Overview  Goal: Plan of Care Review  Outcome: Ongoing (interventions implemented as appropriate)   09/13/18 0909   Coping/Psychosocial   Plan of Care Reviewed With patient   Plan of Care Review   Progress improving   OTHER   Outcome Summary PT tx completed. Pt suppine in bed with no c/o. Denies any dizziness and states it has improved since yesterday. I bed mobility, CI/S transfers, and amb 100' x 2 SBA/S. Plan for discharge today.

## 2018-09-13 NOTE — PLAN OF CARE
Problem: Patient Care Overview  Goal: Plan of Care Review  Outcome: Ongoing (interventions implemented as appropriate)   09/13/18 1152   Coping/Psychosocial   Plan of Care Reviewed With patient   Plan of Care Review   Progress improving   OTHER   Outcome Summary PT tx completed. Pt supine in bed. No c/o. S bed mobility, sit<>stand CG, amb 40' x 2 [place]. Pt has decreased attention to lef UE/LE. Deviates left during walking, unable to correct with cueing. LLE delayed duriing swing phase with narrow base of support. Performed high level balance activites. LOB with balance challanged.Benefit from rehab for cont'd PT/OT.

## 2018-09-13 NOTE — PLAN OF CARE
"Problem: Patient Care Overview  Goal: Plan of Care Review  Outcome: Ongoing (interventions implemented as appropriate)   09/13/18 0314   Coping/Psychosocial   Plan of Care Reviewed With patient   Plan of Care Review   Progress no change   OTHER   Outcome Summary pt has been drowsy tonight, irritable at times, ambulated to BR, dressing has \"fallen off\" a couple times already this shift. VSS. Wife at bedside. No neurological changes noted. Safety maintained.        Problem: Fall Risk (Adult)  Goal: Absence of Fall  Outcome: Ongoing (interventions implemented as appropriate)      Problem: Pain, Acute (Adult)  Goal: Acceptable Pain Control/Comfort Level  Outcome: Ongoing (interventions implemented as appropriate)        "

## 2018-09-13 NOTE — THERAPY TREATMENT NOTE
Acute Care - Physical Therapy Treatment Note  [facility]     Patient Name: Dwight Cobian  : 1940  MRN: [id number]  Today's Date: 2018  Onset of Illness/Injury or Date of Surgery: 18  Date of Referral to PT: 18  Referring Physician: Dr. Ervin     Admit Date: 2018    Visit Dx:    ICD-10-CM ICD-9-CM   1. Neoplasm, brain (CMS/HCC) D49.6 239.6   2. Impaired mobility Z74.09 799.89   3. Impaired mobility and ADLs Z74.09 799.89   4. Malignant neoplasm of brain (CMS/HCC) C71.9 191.9   5. 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)   • Unsteady gait   • Forgetfulness   • Neoplasm, brain (CMS/HCC)   • Malignant neoplasm of brain (CMS/HCC)       Therapy Treatment          Rehabilitation Treatment Summary     Row Name 18 1506 18 1130 18 1016       Treatment Time/Intention    Discipline physical therapy assistant  -KJ physical therapy assistant  -KJ occupational therapy assistant  -MM    Document Type therapy note (daily note)  -KJ therapy note (daily note)  -KJ therapy note (daily note)  -MM    Subjective Information complains of   headache  -KJ2 no complaints  -KJ no complaints  -MM    Mode of Treatment physical therapy  -KJ2 physical therapy  -KJ occupational therapy  -MM    Patient/Family Observations  --  -- wife present   -MM    Patient Effort excellent  -KJ2 excellent  -KJ excellent  -MM    Comment  -- decreased attenttion to left side; deviates to left during walking  -KJ2 decreased awareness to L side during functional mobility   -MM    Existing Precautions/Restrictions  -- fall  -KJ fall  -MM    Treatment Considerations/Comments  --  -- leans to the L  -MM    Recorded by [KJ] Karla Lozada, PTA 18 1516  [KJ2] Karla Lozada, PTA 18 1533 [KJ] Karla Lozada, PTA 18 1144  [KJ2]  Karla Lozada, [facility] 09/13/18 1151 [MM] Nancy Andrea OTA 09/13/18 1118    Row Name 09/13/18 0839             Treatment Time/Intention    Discipline physical therapy assistant  -KJ      Treatment Considerations/Comments pt reports dizziness is better today  -KJ      Recorded by [KJ] Karla Lozada, [facility] 09/13/18 0909      Row Name 09/13/18 1016             Safety Issues, Functional Mobility    Safety Issues Affecting Function (Mobility) insight into deficits/self awareness;safety precaution awareness  -MM      Impairments Affecting Function (Mobility) balance;endurance/activity tolerance;postural/trunk control  -MM      Comment, Safety Issues/Impairments (Mobility) leans to left; unsteady; VCs to return to midline   -MM      Recorded by [MM] Nancy Andrea OTA 09/13/18 1118      Row Name 09/13/18 1506 09/13/18 1130 09/13/18 1016       Bed Mobility Assessment/Treatment    Bed Mobility Assessment/Treatment  --  -- bed mobility (all) activities  -MM    [place] Level (Bed Mobility)  --  -- supervision;standby assist  -MM    Supine-Sit [place] (Bed Mobility) conditional independence  -KJ supervision  -KJ  --    Sit-Supine [place] (Bed Mobility) conditional independence  -KJ supervision  -KJ  --    Bed Mobility, Safety Issues  --  -- impaired trunk control for bed mobility  -MM    Comment (Bed Mobility) cueing for safety, tends to want to get up with SCG's still on and sits too quickly  -KJ  --  --    Recorded by [KJ] Karla Lozada, [facility] 09/13/18 1533 [KJ] Karla Lozada, [facility] 09/13/18 1151 [MM] Nancy Andrea OTA 09/13/18 1118    Row Name 09/13/18 1016             Functional Mobility    Functional Mobility- Ind. Level verbal cues required;minimum assist (75% patient effort)  -MM      Functional Mobility- Safety Issues balance decreased during turns;step length decreased  -MM      Functional Mobility- Comment in room>bathroom; in hallway to closet; in hallway simulating community mobility  activity   -MM      Recorded by [MM] Nancy Andrea OTA 09/13/18 1118      Row Name 09/13/18 1016             Transfer Assessment/Treatment    Transfer Assessment/Treatment sit-stand transfer;stand-sit transfer;toilet transfer  -MM      Comment (Transfers) leaning to the L; VCs to maintain/return to midline   -MM      Recorded by [MM] Nancy Andrea [facility] 09/13/18 1118      Row Name 09/13/18 1506 09/13/18 1130 09/13/18 1016       Sit-Stand Transfer    Sit-Stand [place] (Transfers) contact guard  -KJ contact guard;supervision  -KJ contact guard  -MM    Recorded by [KJ] Karla Lozada, [facility] 09/13/18 1533 [KJ] Karla Lozada, [facility] 09/13/18 1151 [MM] Nancy Andrea [facility] 09/13/18 1118    Row Name 09/13/18 1506 09/13/18 1130 09/13/18 1016       Stand-Sit Transfer    Stand-Sit [place] (Transfers) contact guard  -KJ contact guard;supervision  -KJ contact guard  -MM    Recorded by [KJ] Karla Lozada, PTA 09/13/18 1533 [KJ] Karla Lozada, [facility] 09/13/18 1151 [MM] Nancy Andrea [facility] 09/13/18 1118    Row Name 09/13/18 1506 09/13/18 1016          Toilet Transfer    Type (Toilet Transfer)  -- sit-stand;stand-sit  -MM     [place] Level (Toilet Transfer) minimum assist (75% patient effort)   off balance trying to pull down depends  -KJ contact guard  -MM     Assistive Device (Toilet Transfer)  -- grab bars/safety frame  -MM     Recorded by [KJ] Karla Lozada, PTA 09/13/18 1533 [MM] Nancy Andrea [facility] 09/13/18 1118     Row Name 09/13/18 1506 09/13/18 1130          Gait/Stairs Assessment/Training    [place] Level (Gait) minimum assist (75% patient effort);verbal cues  -KJ minimum assist (75% patient effort)  -KJ     Distance in Feet (Gait) 40' x 3, standing stops several times to focus on gait pattern and safety  -KJ 40' x 2  -KJ     Deviations/Abnormal Patterns (Gait) left sided deviations;base of support, wide  -KJ left sided deviations;base of support, narrow;jocelyne decreased;gait speed  decreased;stride length decreased  -KJ     Left Sided Gait Deviations weight shift ability decreased;heel strike decreased  -KJ weight shift ability decreased   deviates to the left during gait  -KJ     Comment (Gait/Stairs) deviates and sways to left during gait. Decreased balance and safety awarness. Cueing for  gait and postural correction.  Min assist to recover.  -KJ cueing for walking straight path; which is very difficult  -KJ     Recorded by [KJ] Karla Lozada, PTA 09/13/18 1533 [KJ] Karla Lozada, [facility] 09/13/18 1151     Row Name 09/13/18 1016             ADL Assessment/Intervention    BADL Assessment/Intervention upper body dressing;lower body dressing;grooming;toileting  -MM      Recorded by [MM] Nancy Andrea OTA 09/13/18 1118      Row Name 09/13/18 1016             Upper Body Dressing Assessment/Training    Upper Body Dressing [place] Level doff;don;supervision   gown  -MM      Upper Body Dressing Position edge of bed sitting  -MM      Recorded by [MM] Nancy Andrea OTA 09/13/18 1118      Row Name 09/13/18 1016             Lower Body Dressing Assessment/Training    Lower Body Dressing [place] Level doff;don;socks;contact guard assist  -MM      Lower Body Dressing Position edge of bed sitting  -MM      Comment (Lower Body Dressing) CGA due to decreased sitting balance   -MM      Recorded by [MM] Nancy Andrea OTA 09/13/18 1118      Row Name 09/13/18 1016             Grooming Assessment/Training    [place] Level (Grooming) wash face, hands;contact guard assist  -MM      Grooming Position supported standing  -MM      Recorded by [MM] Nancy Andrea OTA 09/13/18 1118      Row Name 09/13/18 1016             Toileting Assessment/Training    [place] Level (Toileting) adjust/manage clothing;perform perineal hygiene   SBA  -MM      Toileting Position unsupported sitting;unsupported standing  -MM      Comment (Toileting) SBA for sitting aspect; CGA during standing   -MM       Recorded by [MM] Nancy Andrea [facility] 09/13/18 1118      Row Name 09/13/18 1016             BADL Safety/Performance    Impairments, BADL Safety/Performance balance;endurance/activity tolerance;trunk/postural control  -MM      Cognitive Impairments, BADL Safety/Performance awareness, need for assistance;insight into deficits/self awareness;safety precaution awareness  -MM      Skilled BADL Treatment/Intervention BADL process/adaptation training;environmental modifications  -MM      Progress in BADL Status independence level;effort and initiation  -MM      Recorded by [MM] Nancy Andrea OTA 09/13/18 1118      Row Name 09/13/18 1506 09/13/18 1130          Therapeutic Exercise    Exercise Type (Therapeutic Exercise) AROM (active range of motion)  -KJ AROM (active range of motion)  -KJ     Position (Therapeutic Exercise) standing  -KJ standing  -KJ     Sets/Reps (Therapeutic Exercise) 10  -KJ 15  -KJ     Recorded by [KJ] Karla Lozada, [facility] 09/13/18 1533 [KJ] Karla Lozada, [facility] 09/13/18 1151     Row Name 09/13/18 1016             Dynamic Standing Balance    Level of [place], Reaches Outside Midline (Standing, Dynamic Balance) contact guard assist  -MM      Time Able to Maintain Position, Reaches Outside Midline (Standing, Dynamic Balance) 30 to 45 seconds  -MM      Comment, Reaches Outside Midline (Standing, Dynamic Balance) At closet reaching for towels/folding washcloths; working on GM and FM movements while outside of PAM. Pt required CGA and VCs to correct back to midline when leaning to the L   -MM      Recorded by [MM] Nancy Andrea OTA 09/13/18 1118      Row Name 09/13/18 1506 09/13/18 1130          Dynamic Balance Activity    Therapeutic Training Performed (Dynamic Balance) backward walking;functional activities with reaching or leaning any direction;side stepping;figure eights  -KJ ambulate backward;figure eights;functional activities with reaching or leaning any direction;side  stepping;360 degree turns  -KJ     Comment (Dynamic Balance Training)  -- decreased balance with figure 8; deviates to the left during activties  -KJ     Recorded by [KJ] Karla Lozada, [facility] 09/13/18 1533 [KJ] Karla Lozada, [facility] 09/13/18 1151     Row Name 09/13/18 1506 09/13/18 1130 09/13/18 1016       Positioning and Restraints    Pre-Treatment Position in bed  -KJ in bed  -KJ in bed  -MM    Post Treatment Position bed  -KJ bed  -KJ bed  -MM    In Bed  --  -- fowlers;call light within reach;encouraged to call for assist;exit alarm on;with family/caregiver;side rails up x2;SCD pump applied  -MM    Recorded by [KJ] Karla Lozada, [facility] 09/13/18 1533 [KJ] Karla Lozada, [facility] 09/13/18 1151 [MM] Nancy Andrea OTA 09/13/18 1118    Row Name 09/13/18 0839             Positioning and Restraints    Pre-Treatment Position in bed  -KJ      Post Treatment Position chair  -KJ      Recorded by [KJ] Karla Lozada, [facility] 09/13/18 0909      Row Name 09/13/18 1506 09/13/18 1130 09/13/18 1016       Pain Scale: Numbers Pre/Post-Treatment    Pain Scale: Numbers, Pretreatment 2/10  -KJ 0/10 - no pain  -KJ 0/10 - no pain  -MM    Pain Scale: Numbers, Post-Treatment 2/10  -KJ  -- 0/10 - no pain  -MM    Pain Location head  -KJ  --  --    Recorded by [KJ] Karla Lozada, [facility] 09/13/18 1533 [KJ] Karla Lozada, [facility] 09/13/18 1151 [MM] Nancy Andrea OTA 09/13/18 1118    Row Name                Wound 09/10/18 1602 Right head incision    Wound - Properties Group Date first assessed: 09/10/18 [MICHAELA] Time first assessed: 1602 [MICHAELA] Side: Right [MICHAELA] Location: head [MICHAELA] Type: incision [MICHAELA] Recorded by:  [MICHAELA] Fanny Obrien RN 09/10/18 1602    Row Name 09/13/18 1016             Plan of Care Review    Plan of Care Reviewed With patient;spouse  -MM      Recorded by [MM] Nancy Andrea OTA 09/13/18 1118      Row Name 09/13/18 1016             Outcome Summary/Treatment Plan (OT)    Daily Summary of Progress (OT) progress toward  functional goals is good  -MM      Recorded by [MM] Nancy Andrea OTA 09/13/18 1118        User Key  (r) = Recorded By, (t) = Taken By, (c) = Cosigned By    Initials Name Effective Dates Discipline    Karla Reynoso, BELKIS 08/02/16 -  PT    Fanny Garcia RN 08/02/16 -  Nurse    MM Nancy Andrea, JAROD 08/01/18 -  OT          Wound 09/10/18 1602 Right head incision (Active)   Dressing Appearance intact;dry 9/12/2018  8:01 PM   Drainage Amount none 9/12/2018  8:01 PM   Dressing Care, Wound dressing changed 9/12/2018 11:58 PM             Physical Therapy Education     Title: PT OT SLP Therapies (Done)     Topic: Physical Therapy (Done)     Point: Mobility training (Done)    Learning Progress Summary     Learner Status Readiness Method Response Comment Documented by    Patient Done Acceptance E VU,NR Pt educated on purpose of PT and POC. Pt educated on safety awareness when transferring and ambulating. Pt educated on gait mechanics.  09/11/18 1358     Active Acceptance E NR Educated on coordination and balance activities  09/09/18 0845     Done Eager E VU,NR Educated patient on improving heel strike  09/08/18 0936     Done Acceptance E,D DU,VU benefits of PT and POC, call for assist OOB  09/07/18 1122    Family Done Acceptance E,D DU,VU benefits of PT and POC, [facility] 09/07/18 [facility]2          Point: Precautions (Done)    Learning Progress Summary     Learner Status Readiness Method Response Comment Documented by    Patient Done Acceptance E,TB VU left side awareness  09/12/18 1251     Done Acceptance E VU,NR Pt educated on purpose of PT and POC. Pt educated on safety awareness when transferring and ambulating. Pt educated on gait mechanics.  09/11/18 1358     Done Acceptance E,D DU,VU benefits of PT and POC, call for assist OOB  09/07/18 1122    Family Done Acceptance E,D DU,VU benefits of PT and POC, [facility] 09/07/18 1122                      User Key      Initials Effective Dates Name Provider Type Discipline     08/02/16 -  Vicky Cosby, PTA Physical Therapy Assistant PT    LH 08/02/16 -  Jose Medrano, PT Physical Therapist PT    RILEY 08/02/16 -  Brennan Nolasco, PTA Physical Therapy Assistant PT    KE 07/30/18 -  Aurea Palacio, PT Student PT Student PT                    PT Recommendation and Plan     Plan of Care Reviewed With: patient  Progress: improving  Outcome Summary: PT tx completed. Pt supine in bed. No c/o. S bed mobility, sit<>stand CG, amb 40' x 2 [place]. Pt has decreased attention to lef UE/LE. Deviates left during walking, unable to correct with cueing. LLE delayed duriing swing phase with narrow base of support. Performed high level balance activites. LOB with balance challanged.Benefit from rehab for cont'd PT/OT.          Outcome Measures     Row Name 09/13/18 1016 09/12/18 1440 09/12/18 1300       How much help from another person do you currently need...    Turning from your back to your side while in flat bed without using bedrails?  --  -- 4  -AH    Moving from lying on back to sitting on the side of a flat bed without bedrails?  --  -- 3  -AH    Moving to and from a bed to a chair (including a wheelchair)?  --  -- 3  -AH    Standing up from a chair using your arms (e.g., wheelchair, bedside chair)?  --  -- 3  -AH    Climbing 3-5 steps with a railing?  --  -- 2  -AH    To walk in hospital room?  --  -- 3  -AH    AM-PAC 6 Clicks Score  --  -- 18  -AH       How much help from another is currently needed...    Putting on and taking off regular lower body clothing? 3  -MM 3  -MM  --    Bathing (including washing, rinsing, and drying) 3  -MM 2  -MM  --    Toileting (which includes using toilet bed pan or urinal) 3  -MM 3  -MM  --    Putting on and taking off regular upper body clothing 3  -MM 3  -MM  --    Taking care of personal grooming (such as brushing teeth) 4  -MM 4  -MM  --    Eating meals 4  -MM 4  -MM  --    Score 20  -MM 19  -MM  --     Row Name 09/11/18 1300             How much help from another person do you currently need...    Turning from your back to your side while in flat bed without using bedrails? 4  -MIMI (r) KE (t) MIMI (c)      Moving from lying on back to sitting on the side of a flat bed without bedrails? 4  -MIMI (r) KE (t) MIMI (c)      Moving to and from a bed to a chair (including a wheelchair)? 3  -MIMI (r) KE (t) MIMI (c)      Standing up from a chair using your arms (e.g., wheelchair, bedside chair)? 3  -MIMI (r) KE (t) MIMI (c)      Climbing 3-5 steps with a railing? 3  -MIMI (r) KE (t) MIMI (c)      To walk in hospital room? 3  -MIMI (r) KE (t) MIMI (c)      AM-PAC 6 Clicks Score 20  -MIMI (r) KE (t)         Functional Assessment    Outcome Measure Options AM-PAC 6 Clicks Basic Mobility (PT)  -MIMI (r) KE (t) MIMI (c)        User Key  (r) = Recorded By, (t) = Taken By, (c) = Cosigned By    Initials Name Provider Type    Vicky Gutierrez, PTA Physical Therapy Assistant    Bam Krishnamurthy, PT DPT Physical Therapist    Aurea Isaac, PT Student PT Student    MM Nancy Andrea OTA Occupational Therapy Assistant           Time Calculation:         PT Charges     Row Name 09/13/18 1533 09/13/18 1151 09/13/18 0839       Time Calculation    Start Time 1506  -KJ 1130  -KJ 0839  -KJ    Stop Time 1533  -KJ 1153  -KJ 0904  -KJ    Time Calculation (min) 27 min  -KJ 23 min  -KJ 25 min  -KJ    PT Received On 09/13/18  -KJ 09/13/18  -KJ 09/13/18  -KJ    PT Goal Re-Cert Due Date 09/21/18  -KJ 09/21/18  -KJ 09/21/18  -KJ       Time Calculation- PT    Total Timed Code Minutes- PT 27 minute(s)  -KJ 23 minute(s)  -KJ 25 minute(s)  -KJ      User Key  (r) = Recorded By, (t) = Taken By, (c) = Cosigned By    Initials Name Provider Type    Karla Reynoso, PTA Physical Therapy Assistant        Therapy Suggested Charges     Code   Minutes Charges    79924 (CPT®) Hc Pt Neuromusc Re Education Ea 15 Min      05496 (CPT®) Hc Pt Ther Proc Ea 15 Min      34419  (CPT®) Hc Gait Training Ea 15 Min 15 1    06519 (CPT®) Hc Pt Therapeutic Act Ea 15 Min 15 1    74665 (CPT®) Hc Pt Manual Therapy Ea 15 Min      30096 (CPT®) Hc Pt Iontophoresis Ea 15 Min      88297 (CPT®) Hc Pt Elec Stim Ea-Per 15 Min      93369 (CPT®) Hc Pt Ultrasound Ea 15 Min      73201 (CPT®) Hc Pt Self Care/Mgmt/Train Ea 15 Min      25716 (CPT®) Hc Pt Prosthetic (S) Train Initial Encounter, Each 15 Min      89396 (CPT®) Hc Pt Orthotic(S)/Prosthetic(S) Encounter, Each 15 Min      64368 (CPT®) Hc Orthotic(S) Mgmt/Train Initial Encounter, Each 15min      Total  30 2        Therapy Charges for Today     Code Description Service Date Service Provider Modifiers Qty    59437138100 HC GAIT TRAINING EA 15 MIN 9/13/2018 Karla Lozada, PTA GP, KX 1    76337175383 HC PT THER PROC EA 15 MIN 9/13/2018 Karla Lozada, PTA GP, KX 1    64506723975 HC GAIT TRAINING EA 15 MIN 9/13/2018 Karla Lozada, PTA GP, KX 1    13214776619 HC PT THER PROC EA 15 MIN 9/13/2018 Karla Lozada, PTA GP, KX 1          PT G-Codes  PT Professional Judgement Used?: Yes  Outcome Measure Options: AM-PAC 6 Clicks Basic Mobility (PT)  AM-PAC 6 Clicks Score: 18  Score: 20  Functional Limitation: Mobility: Walking and moving around  Mobility: Walking and Moving Around Current Status (): At least 20 percent but less than 40 percent impaired, limited or restricted  Mobility: Walking and Moving Around Goal Status (): At least 1 percent but less than 20 percent impaired, limited or restricted    Karla Lozada PTA  9/13/2018

## 2018-09-13 NOTE — THERAPY TREATMENT NOTE
Acute Care - Physical Therapy Treatment Note  [facility]     Patient Name: Dwight Cobian  : 1940  MRN: [id number]  Today's Date: 2018  Onset of Illness/Injury or Date of Surgery: 18  Date of Referral to PT: 18  Referring Physician: Dr. Ervin     Admit Date: 2018    Visit Dx:    ICD-10-CM ICD-9-CM   1. Neoplasm, brain (CMS/HCC) D49.6 239.6   2. Impaired mobility Z74.09 799.89   3. Impaired mobility and ADLs Z74.09 799.89   4. Malignant neoplasm of brain (CMS/HCC) C71.9 191.9   5. 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)   • Unsteady gait   • Forgetfulness   • Neoplasm, brain (CMS/HCC)   • Malignant neoplasm of brain (CMS/HCC)       Therapy Treatment          Rehabilitation Treatment Summary     Row Name 18 1130 18 1016 18 0839       Treatment Time/Intention    Discipline physical therapy assistant  -KJ occupational therapy assistant  -MM physical therapy assistant  -KJ    Document Type therapy note (daily note)  -KJ therapy note (daily note)  -MM  --    Subjective Information no complaints  -KJ no complaints  -MM  --    Mode of Treatment physical therapy  -KJ occupational therapy  -MM  --    Patient/Family Observations  -- wife present   -MM  --    Patient Effort excellent  -KJ excellent  -MM  --    Comment decreased attenttion to left side; deviates to left during walking  -KJ2 decreased awareness to L side during functional mobility   -MM  --    Existing Precautions/Restrictions fall  -KJ fall  -MM  --    Treatment Considerations/Comments  -- leans to the L  -MM pt reports dizziness is better today  -KJ    Recorded by [KJ] Karla Lozada, PTA 18 1144  [KJ2] Karla Lozada, PTA 18 1151 [MM] Nancy Andrea OTA 18 1118 [KJ] Karla Lozada, PTA 18 0909    Row Name  09/13/18 1016             Safety Issues, Functional Mobility    Safety Issues Affecting Function (Mobility) insight into deficits/self awareness;safety precaution awareness  -MM      Impairments Affecting Function (Mobility) balance;endurance/activity tolerance;postural/trunk control  -MM      Comment, Safety Issues/Impairments (Mobility) leans to left; unsteady; VCs to return to midline   -MM      Recorded by [MM] Nancy Andrea OTA 09/13/18 1118      Row Name 09/13/18 1130 09/13/18 1016          Bed Mobility Assessment/Treatment    Bed Mobility Assessment/Treatment  -- bed mobility (all) activities  -MM     [place] Level (Bed Mobility)  -- supervision;standby assist  -MM     Supine-Sit [place] (Bed Mobility) supervision  -KJ  --     Sit-Supine [place] (Bed Mobility) supervision  -KJ  --     Bed Mobility, Safety Issues  -- impaired trunk control for bed mobility  -MM     Recorded by [KJ] Karla Lozada, PTA 09/13/18 1151 [MM] Nancy Andrea OTA 09/13/18 1118     Row Name 09/13/18 1016             Functional Mobility    Functional Mobility- Ind. Level verbal cues required;minimum assist (75% patient effort)  -MM      Functional Mobility- Safety Issues balance decreased during turns;step length decreased  -MM      Functional Mobility- Comment in room>bathroom; in hallway to closet; in hallway simulating community mobility activity   -MM      Recorded by [MM] Nancy Andrea OTA 09/13/18 1118      Row Name 09/13/18 1016             Transfer Assessment/Treatment    Transfer Assessment/Treatment sit-stand transfer;stand-sit transfer;toilet transfer  -MM      Comment (Transfers) leaning to the L; VCs to maintain/return to midline   -MM      Recorded by [MM] Nancy Andrea OTA 09/13/18 1118      Row Name 09/13/18 1130 09/13/18 1016          Sit-Stand Transfer    Sit-Stand [place] (Transfers) contact guard;supervision  -KJ contact guard  -MM     Recorded by [KJ] Karla Lozada, PTA  09/13/18 1151 [MM] Nancy Andrea [facility] 09/13/18 1118     Row Name 09/13/18 1130 09/13/18 1016          Stand-Sit Transfer    Stand-Sit [place] (Transfers) contact guard;supervision  -KJ contact guard  -MM     Recorded by [KJ] Karla Lozada, [facility] 09/13/18 1151 [MM] Nancy Andrea [facility] 09/13/18 1118     Row Name 09/13/18 1016             Toilet Transfer    Type (Toilet Transfer) sit-stand;stand-sit  -MM      [place] Level (Toilet Transfer) contact guard  -MM      Assistive Device (Toilet Transfer) grab bars/safety frame  -MM      Recorded by [MM] Nancy Andrea [facility] 09/13/18 1118      Row Name 09/13/18 1130             Gait/Stairs Assessment/Training    [place] Level (Gait) minimum assist (75% patient effort)  -KJ      Distance in Feet (Gait) 40' x 2  -KJ      Deviations/Abnormal Patterns (Gait) left sided deviations;base of support, narrow;jocelyne decreased;gait speed decreased;stride length decreased  -KJ      Left Sided Gait Deviations weight shift ability decreased   deviates to the left during gait  -KJ      Comment (Gait/Stairs) cueing for walking straight path; which is very difficult  -KJ      Recorded by [KJ] Karla Lozada, [facility] 09/13/18 1151      Row Name 09/13/18 1016             ADL Assessment/Intervention    BADL Assessment/Intervention upper body dressing;lower body dressing;grooming;toileting  -MM      Recorded by [MM] Nancy Andrea [facility] 09/13/18 1118      Row Name 09/13/18 1016             Upper Body Dressing Assessment/Training    Upper Body Dressing [place] Level doff;don;supervision   gown  -MM      Upper Body Dressing Position edge of bed sitting  -MM      Recorded by [MM] Nancy Andrea [facility] 09/13/18 1118      Row Name 09/13/18 1016             Lower Body Dressing Assessment/Training    Lower Body Dressing [place] Level doff;don;socks;contact guard assist  -MM      Lower Body Dressing Position edge of bed sitting  -MM      Comment (Lower Body Dressing)  CGA due to decreased sitting balance   -MM      Recorded by [MM] Nancy Andrea OTA 09/13/18 1118      Row Name 09/13/18 1016             Grooming Assessment/Training    [place] Level (Grooming) wash face, hands;contact guard assist  -MM      Grooming Position supported standing  -MM      Recorded by [MM] Nancy Andrea OTA 09/13/18 1118      Row Name 09/13/18 1016             Toileting Assessment/Training    [place] Level (Toileting) adjust/manage clothing;perform perineal hygiene   SBA  -MM      Toileting Position unsupported sitting;unsupported standing  -MM      Comment (Toileting) SBA for sitting aspect; CGA during standing   -MM      Recorded by [MM] Nancy Andrea OTA 09/13/18 1118      Row Name 09/13/18 1016             BADL Safety/Performance    Impairments, BADL Safety/Performance balance;endurance/activity tolerance;trunk/postural control  -MM      Cognitive Impairments, BADL Safety/Performance awareness, need for assistance;insight into deficits/self awareness;safety precaution awareness  -MM      Skilled BADL Treatment/Intervention BADL process/adaptation training;environmental modifications  -MM      Progress in BADL Status independence level;effort and initiation  -MM      Recorded by [MM] Nancy Andrea OTA 09/13/18 1118      Row Name 09/13/18 1130             Therapeutic Exercise    Exercise Type (Therapeutic Exercise) AROM (active range of motion)  -KJ      Position (Therapeutic Exercise) standing  -KJ      Sets/Reps (Therapeutic Exercise) 15  -KJ      Recorded by [KJ] Karla Lozada, [facility] 09/13/18 1151      Row Name 09/13/18 1016             Dynamic Standing Balance    Level of [place], Reaches Outside Midline (Standing, Dynamic Balance) contact guard assist  -MM      Time Able to Maintain Position, Reaches Outside Midline (Standing, Dynamic Balance) 30 to 45 seconds  -MM      Comment, Reaches Outside Midline (Standing, Dynamic Balance) At closet reaching for  towels/folding washcloths; working on GM and FM movements while outside of PAM. Pt required CGA and VCs to correct back to midline when leaning to the L   -MM      Recorded by [MM] Nancy Andrea OTA 09/13/18 1118      Row Name 09/13/18 1130             Dynamic Balance Activity    Therapeutic Training Performed (Dynamic Balance) ambulate backward;figure eights;functional activities with reaching or leaning any direction;side stepping;360 degree turns  -KJ      Comment (Dynamic Balance Training) decreased balance with figure 8; deviates to the left during activties  -KJ      Recorded by [KJ] Karla Lozada, BELKIS 09/13/18 1151      Row Name 09/13/18 1130 09/13/18 1016 09/13/18 0839       Positioning and Restraints    Pre-Treatment Position in bed  -KJ in bed  -MM in bed  -KJ    Post Treatment Position bed  -KJ bed  -MM chair  -KJ    In Bed  -- fowlers;call light within reach;encouraged to call for assist;exit alarm on;with family/caregiver;side rails up x2;SCD pump applied  -MM  --    Recorded by [KJ] Karla Lozada, PTA 09/13/18 1151 [MM] Nancy Andrea OTA 09/13/18 1118 [KJ] Karla Lozada, [facility] 09/13/18 0909    Row Name 09/13/18 1130 09/13/18 1016          Pain Scale: Numbers Pre/Post-Treatment    Pain Scale: Numbers, Pretreatment 0/10 - no pain  -KJ 0/10 - no pain  -MM     Pain Scale: Numbers, Post-Treatment  -- 0/10 - no pain  -MM     Recorded by [KJ] Karla Lozada, BELKIS 09/13/18 1151 [MM] Nancy Andrea OTA 09/13/18 1118     Row Name                Wound 09/10/18 1602 Right head incision    Wound - Properties Group Date first assessed: 09/10/18 [MICHAELA] Time first assessed: 1602 [MICHAELA] Side: Right [MICHAELA] Location: head [MICHAELA] Type: incision [MICHAELA] Recorded by:  [MICHAELA] Fanny Obrien RN 09/10/18 1602    Row Name 09/13/18 1016             Plan of Care Review    Plan of Care Reviewed With patient;spouse  -MM      Recorded by [MM] Nancy Andrea OTA 09/13/18 1118      Row Name 09/13/18 1016              Outcome Summary/Treatment Plan (OT)    Daily Summary of Progress (OT) progress toward functional goals is good  -MM      Recorded by [MM] Nancy Andrea OTA 09/13/18 1118        User Key  (r) = Recorded By, (t) = Taken By, (c) = Cosigned By    Initials Name Effective Dates Discipline    Karla Reynoso, BELKIS 08/02/16 -  PT    Fanny Garcia RN 08/02/16 -  Nurse    MM Nancy Andrea OTA 08/01/18 -  OT          Wound 09/10/18 1602 Right head incision (Active)   Dressing Appearance intact;dry 9/12/2018  8:01 PM   Drainage Amount none 9/12/2018  8:01 PM   Dressing Care, Wound dressing changed 9/12/2018 11:58 PM             Physical Therapy Education     Title: PT OT SLP Therapies (Done)     Topic: Physical Therapy (Done)     Point: Mobility training (Done)    Learning Progress Summary     Learner Status Readiness Method Response Comment Documented by    Patient Done Acceptance E VU,NR Pt educated on purpose of PT and POC. Pt educated on safety awareness when transferring and ambulating. Pt educated on gait mechanics.  09/11/18 1358     Active Acceptance E NR Educated on coordination and balance activities  09/09/18 0845     Done Eager E VU,NR Educated patient on improving heel strike  09/08/18 0936     Done Acceptance E,D DU,VU benefits of PT and POC, [facility] 09/07/18 1122    Family Done Acceptance E,D DU,VU benefits of PT and POC, [facility] 09/07/18 1122          Point: Precautions (Done)    Learning Progress Summary     Learner Status Readiness Method Response Comment Documented by    Patient Done Acceptance E,TB VU left side awareness  09/12/18 1251     Done Acceptance E VU,NR Pt educated on purpose of PT and POC. Pt educated on safety awareness when transferring and ambulating. Pt educated on gait mechanics.  09/11/18 1358     Done Acceptance E,D DU,VU benefits of PT and POC, [facility] 09/07/18 1122    Family Done Acceptance E,D DU,VU benefits of PT  and POC, call for assist OOB  09/07/18 1122                      User Key     Initials Effective Dates Name Provider Type Discipline     08/02/16 -  Vicky Cosby, PTA Physical Therapy Assistant PT     08/02/16 -  Jose Medrano, PT Physical Therapist PT    RILEY 08/02/16 -  Brennan Nolasco, PTA Physical Therapy Assistant PT     07/30/18 -  Aurea Palacio, PT Student PT Student PT                    PT Recommendation and Plan     Plan of Care Reviewed With: patient  Progress: improving  Outcome Summary: PT tx completed. Pt supine in bed. No c/o. S bed mobility, sit<>stand CG, amb 40' x 2 [place]. Pt has decreased attention to lef UE/LE. Deviates left during walking, unable to correct with cueing. LLE delayed duriing swing phase with narrow base of support. Performed high level balance activites. LOB with balance challanged.Benefit from rehab for cont'd PT/OT.          Outcome Measures     Row Name 09/13/18 1016 09/12/18 1440 09/12/18 1300       How much help from another person do you currently need...    Turning from your back to your side while in flat bed without using bedrails?  --  -- 4  -AH    Moving from lying on back to sitting on the side of a flat bed without bedrails?  --  -- 3  -AH    Moving to and from a bed to a chair (including a wheelchair)?  --  -- 3  -AH    Standing up from a chair using your arms (e.g., wheelchair, bedside chair)?  --  -- 3  -AH    Climbing 3-5 steps with a railing?  --  -- 2  -AH    To walk in hospital room?  --  -- 3  -AH    AM-PAC 6 Clicks Score  --  -- 18  -AH       How much help from another is currently needed...    Putting on and taking off regular lower body clothing? 3  -MM 3  -MM  --    Bathing (including washing, rinsing, and drying) 3  -MM 2  -MM  --    Toileting (which includes using toilet bed pan or urinal) 3  -MM 3  -MM  --    Putting on and taking off regular upper body clothing 3  -MM 3  -MM  --    Taking care of personal grooming (such as brushing teeth) 4   -MM 4  -MM  --    Eating meals 4  -MM 4  -MM  --    Score 20  -MM 19  -MM  --    Row Name 09/11/18 1300             How much help from another person do you currently need...    Turning from your back to your side while in flat bed without using bedrails? 4  -MIMI (r) KE (t) MIMI (c)      Moving from lying on back to sitting on the side of a flat bed without bedrails? 4  -MIMI (r) KE (t) MIMI (c)      Moving to and from a bed to a chair (including a wheelchair)? 3  -MIMI (r) KE (t) MIMI (c)      Standing up from a chair using your arms (e.g., wheelchair, bedside chair)? 3  -MIMI (r) KE (t) MIMI (c)      Climbing 3-5 steps with a railing? 3  -MIMI (r) KE (t) MIMI (c)      To walk in hospital room? 3  -MIMI (r) KE (t) MIMI (c)      AM-PAC 6 Clicks Score 20  -MIMI (r) KE (t)         Functional Assessment    Outcome Measure Options AM-PAC 6 Clicks Basic Mobility (PT)  -MIMI (r) KE (t) MIMI (c)        User Key  (r) = Recorded By, (t) = Taken By, (c) = Cosigned By    Initials Name Provider Type    Vicky Gutierrez, BELKIS Physical Therapy Assistant    Bam Krishnamurthy, PT DPT Physical Therapist    Aurea Isaac, PT Student PT Student    Nancy Smith OTA Occupational Therapy Assistant           Time Calculation:         PT Charges     Row Name 09/13/18 1151 09/13/18 0839          Time Calculation    Start Time 1130  -KJ 0839  -KJ     Stop Time 1153  -KJ 0904  -KJ     Time Calculation (min) 23 min  -KJ 25 min  -KJ     PT Received On 09/13/18  -KJ 09/13/18  -KJ     PT Goal Re-Cert Due Date 09/21/18  -KJ 09/21/18  -KJ        Time Calculation- PT    Total Timed Code Minutes- PT 23 minute(s)  -KJ 25 minute(s)  -KJ       User Key  (r) = Recorded By, (t) = Taken By, (c) = Cosigned By    Initials Name Provider Type    Karla Reynoso, PTA Physical Therapy Assistant        Therapy Suggested Charges     Code   Minutes Charges    73823 (CPT®) Hc Pt Neuromusc Re Education Ea 15 Min      74896 (CPT®) Hc Pt Ther Proc Ea 15 Min      52700 (CPT®)  Hc Gait Training Ea 15 Min 15 1    31850 (CPT®) Hc Pt Therapeutic Act Ea 15 Min 15 1    70233 (CPT®) Hc Pt Manual Therapy Ea 15 Min      37388 (CPT®) Hc Pt Iontophoresis Ea 15 Min      70398 (CPT®) Hc Pt Elec Stim Ea-Per 15 Min      73744 (CPT®) Hc Pt Ultrasound Ea 15 Min      90353 (CPT®) Hc Pt Self Care/Mgmt/Train Ea 15 Min      78900 (CPT®) Hc Pt Prosthetic (S) Train Initial Encounter, Each 15 Min      87897 (CPT®) Hc Pt Orthotic(S)/Prosthetic(S) Encounter, Each 15 Min      12402 (CPT®) Hc Orthotic(S) Mgmt/Train Initial Encounter, Each 15min      Total  30 2        Therapy Charges for Today     Code Description Service Date Service Provider Modifiers Qty    65961410366 HC GAIT TRAINING EA 15 MIN 9/13/2018 Karla Lozada, PTA GP, KX 1    90250930897 HC PT THER PROC EA 15 MIN 9/13/2018 Karla Lozada, PTA GP, KX 1          PT G-Codes  PT Professional Judgement Used?: Yes  Outcome Measure Options: AM-PAC 6 Clicks Basic Mobility (PT)  AM-PAC 6 Clicks Score: 18  Score: 20  Functional Limitation: Mobility: Walking and moving around  Mobility: Walking and Moving Around Current Status (): At least 20 percent but less than 40 percent impaired, limited or restricted  Mobility: Walking and Moving Around Goal Status (): At least 1 percent but less than 20 percent impaired, limited or restricted    Karla Lozada PTA  9/13/2018

## 2018-09-13 NOTE — PROGRESS NOTES
[facility] Medicine Services  INPATIENT PROGRESS NOTE    Length of Stay: 7  Date of Admission: 9/6/2018  Primary Care Physician: Saurav Lima MD    Subjective   Chief Complaint: follow up elevated glucoses  HPI   Lying in bed.  Wife in room.  He denies headache this morning.  He denies nausea, vomiting or abdominal pain.  Reports bowel movement while in ICU.  He denies shortness of breath, palpitations, or chest pain.  He reports ambulating with physical therapy and leaning to the left.  Metformin dose at home is 500 mg with breakfast and 1000 mg with supper.  Steroids weaning per neurosurgery.    Review of Systems   Constitutional: Positive for activity change and fatigue.   HENT: Negative for congestion and trouble swallowing.    Eyes: Negative for photophobia and visual disturbance.   Respiratory: Negative for shortness of breath and wheezing.    Cardiovascular: Negative for chest pain, palpitations and leg swelling.   Gastrointestinal: Negative for abdominal distention, abdominal pain, diarrhea, nausea and vomiting.   Endocrine: Negative for cold intolerance, heat intolerance and polyuria.   Genitourinary: Negative for dysuria and urgency.   Musculoskeletal: Positive for gait problem.   Skin: Negative for wound.   Allergic/Immunologic: Negative for immunocompromised state.   Neurological: Positive for weakness and headaches.   Hematological: Negative for adenopathy. Does not bruise/bleed easily.   Psychiatric/Behavioral: Negative for agitation and behavioral problems.      All pertinent negatives and positives are as above. All other systems have been reviewed and are negative unless otherwise stated.     Objective    Temp:  [97.7 °F (36.5 °C)-98.6 °F (37 °C)] 97.7 °F (36.5 °C)  Heart Rate:  [59-77] 64  Resp:  [16] 16  BP: (130-151)/(49-67) 140/60  Physical Exam   Constitutional: He is oriented to person, place, and time. He appears well-developed and well-nourished.   HENT:    Head: Normocephalic and atraumatic.   Eyes: Pupils are equal, round, and reactive to light. EOM are normal.   Neck: Normal range of motion. Neck supple.   Cardiovascular: Normal rate, regular rhythm, normal heart sounds and intact distal pulses.  Exam reveals no gallop and no friction rub.    No murmur heard.  Normal sinus rhythm 58-74   Pulmonary/Chest: Effort normal and breath sounds normal. No respiratory distress. He has no wheezes. He has no rales.   No oxygen in use   Abdominal: Soft. Bowel sounds are normal. He exhibits no distension. There is no tenderness.   Genitourinary:   Genitourinary Comments: Voiding   Musculoskeletal: He exhibits no edema or deformity.   SCDs bilateral lower extremities   Neurological: He is alert and oriented to person, place, and time.   Skin: Skin is warm and dry.    craniotomy incision clean and dry   Psychiatric: He has a normal mood and affect. His behavior is normal. Judgment and thought content normal.     Results Review:  I have reviewed the labs, radiology results, and diagnostic studies.    Laboratory Data:     Results from last 7 days  Lab Units 09/13/18  0408 09/12/18  0403 09/11/18  0330   WBC 10*3/mm3 12.23* 12.01* 13.56*   HEMOGLOBIN g/dL 11.7* 11.7* 11.7*   HEMATOCRIT % 34.5* 34.8* 34.2*   PLATELETS 10*3/mm3 164 169 175          Results from last 7 days  Lab Units 09/13/18  0408 09/12/18  0403 09/11/18  0330 09/10/18  0729  09/06/18  1308   SODIUM mmol/L 137 137 137 138  < > 135   POTASSIUM mmol/L 4.3 4.5 3.9 4.3  < > 4.6   CHLORIDE mmol/L 103 104 105 103  < > 98   CO2 mmol/L 25.0 25.0 24.0 27.0  < > 27.0   BUN mg/dL 20 18 20 24*  < > 22*   CREATININE mg/dL 0.88 0.88 0.90 0.90  < > 1.01   CALCIUM mg/dL 8.4 8.1* 7.9* 8.4  < > 9.1   BILIRUBIN mg/dL  --   --   --  0.5  --  0.5   ALK PHOS U/L  --   --   --  60  --  94   ALT (SGPT) U/L  --   --   --  34  --  29   AST (SGOT) U/L  --   --   --  24  --  27   GLUCOSE mg/dL 258* 242* 185* 224*  < > 469*   < > = values in  this interval not displayed.     Imaging Results (all)     Procedure Component Value Units Date/Time    MRI Brain With & Without Contrast [[id number]] Collected:  09/11/18 1351     Updated:  09/11/18 1402    Narrative:       HISTORY: Status post craniotomy     MRI BRAIN: Multiplanar imaging the brain is performed pre- and post-IV  contrast.     COMPARISON: 9/6/2018     FINDINGS: There are right craniotomy changes. There is pneumocephalus  with greatest amount of air along the anterior right frontal lobe. There  are noted within the frontal horn of the right lateral ventricle. There  is a very small right subdural fluid, likely hemorrhage with surgery.  There is no midline shifting. Blood products are seen within the  operative cavity of the right posterior parietal lobe. There is residual  enhancement tumor seen circumferentially adjacent the operative cavity.  Tumor resected from the posterior medial aspect of the tumor.       Impression:       1. Status post craniotomy with partial resection of the enhancing tumor  of the right posterior parietal lobe. Residual enhancing tumor present  creating mass effect on the posterior horn right lateral ventricle.  Pneumocephalus and small right subdural collection related to recent  surgery. No midline shifting. Basilar cisterns remain visible.  This report was finalized on 09/11/2018 13:59 by Dr. Radha Whitehead MD.    US Renal Bilateral [[id number]] Collected:  09/07/18 1423     Updated:  09/07/18 1427    Narrative:       RENAL ULTRASOUND COMPLETE 9/7/2018 1:16 PM CDT     REASON FOR EXAM: renal cyst; Z74.09-Other reduced mobility; Z74.09-Other  reduced mobility       COMPARISON: None       TECHNIQUE: Multiple longitudinal and transverse realtime sonographic  images of the kidneys and urinary bladder are obtained.      FINDINGS:      RIGHT KIDNEY: 11.9 cm. Normal in size, shape, contour and position. No  solid or cystic masses. The central echo complex is compact with  no  evidence for hydronephrosis. No nephrolithiasis or abnormal perinephric  fluid collections . No hydroureter.      LEFT KIDNEY: 11.7 cm. Normal in size, shape, contour and position. There  is a large cyst involving the upper pole of left kidney measuring 8.9 x  5.9 x 6.9 cm in size.. The central echo complex is compact with no  evidence for hydronephrosis. No nephrolithiasis or abnormal perinephric  fluid collections . No hydroureter.      PELVIS: The bladder is mildly distended with anechoic urine and  demonstrates no significant wall thickening or internal echogenicities.  There is no surrounding ascites.        Impression:       1. Large cortical cyst involving the upper pole of the left kidney.  Otherwise normal renal ultrasound..        This report was finalized on 09/07/2018 14:24 by Dr. Domingo Richey MD.    MRI Brain With & Without Contrast [[id number]] Collected:  09/06/18 1715     Updated:  09/07/18 0858    Narrative:       EXAM: MR BRAIN WITHOUT AND WITH IV CONTRAST 9/7/2018  COMPARISON: MRI brain dated 1/19/2017.      HISTORY: 78 years-old Male. Neoplasm of unspecified behavior of brain     TECHNIQUE:   Routine pulse sequences were obtained of the brain before and after the  administration of IV contrast.      REPORT:   Motion degraded examination.        There is a right supratentorial mass measuring 5.2 x 3.3 x 4.6 cm which  is inseparable from the corpus callosum and the posterior falx..  However, it is unclear if this mass is intra-axial or extra-axial in  location. The corpus callosum appears displaced posteriorly and  laterally. The mass shows diffusion restriction and T2 hypointensity  peripherally with a thick rind, reflecting high cellularity. There are  numerous vessels coursing through the mass. No obvious hemorrhagic  component although. There is T2 hyperintense signal at the center,  nonenhancing that does not suppress on FLAIR, consistent with a  complicated fluid. Of note, the extent  of T2/FLAIR abnormal signal  associated with the mass is underwhelming for a GBM or a parenchymal  metastasis.        Mass effect is noted on the right lateral ventricle, with partial  effacement of the posterior horn, atrium and posterior body of the right  lateral ventricle. The left lateral ventricle is unchanged in size when  compared to MRI of brain dated 1/90/17. Abnormal T2/FLAIR signal in the  bilateral posterior periventricular regions were present on 1/19/2017  and therefore acute hydrocephalus is felt to be less likely. There is no  significant midline shift. The basal cisterns appear preserved at this  time. The flow-voids of the [place] appear maintained  centrally. Grossly, the dural sinuses are patent.          Impression:       1.  Motion degraded examination.  2.  Right parieto-occipital mass as described above. It is unclear if  this is intra-axial (cortical based) or extra-axial. However, the extent  of vasogenic edema is underwhelming for a GBM or an intra-axial  metastasis. Differential consideration does include dural based masses  including, atypical meningioma hemangiopericytoma and dural metastasis.  Aggressive cortical-based glial tumors remain in the differential.  This report was finalized on 09/07/2018 08:55 by Dr. Sharlene Morales MD.    CT Abdomen Pelvis With Contrast [[id number]] Collected:  09/06/18 1536     Updated:  09/06/18 1550    Narrative:       EXAM: CT Abdomen and Pelvis with contrast      9/6/2018 3:36 PM CDT  INDICATION: Neoplasm: abdomen, metastatic, suspected  COMPARISON: None available.  TECHNIQUE:  Abdomen and pelvis were scanned utilizing a multidetector helical  scanner from the diaphragm to the ischial tuberosities after  administration of IV contrast. Coronal and sagittal reformations were  obtained. [Routine protocol is performed.]     Radiation dose equals  mGy-cm.  Automated exposure control dose  reduction technique was implemented.         FINDINGS:     LINES and TUBES: None.     LOWER THORAX: Please see report from CT chest dated same day.     HEPATOBILIARY:  The liver is incompletely visualize. No focal hepatic  lesions.   No liver laceration.   No biliary ductal dilatation.      GALLBLADDER:  No radiopaque stones or sludge.   No wall thickening.      SPLEEN:  No splenomegaly.    No splenic laceration.      PANCREAS:  No focal masses or ductal dilatation.      ADRENALS:  No adrenal nodules.      KIDNEYS/URETERS:  Kidneys enhance symmetrically.   No hydronephrosis.    There is a 7.2 cm well-circumscribed lesion within the left kidney..  No  stones.      GI TRACT:  No abnormal distention, wall thickening, or evidence of bowel  obstruction.   No acute appendicitis. Colonic diverticulosis, without  evidence of acute diverticulitis..      PELVIC ORGANS/BLADDER:  Unremarkable.      LYMPH NODES:  No lymphadenopathy.      VESSELS:  Atherosclerotic disease..      PERITONEUM / RETROPERITONEUM:  No free air or fluid.      BONES:  Unremarkable.      SOFT TISSUES:  Unremarkable.        Impression:       1.  No discrete mass identified in the abdomen or pelvis.  2.  Left renal cyst.  This report was finalized on 09/06/2018 15:47 by Dr. Sharlene Morales MD.    CT Chest With Contrast [[id number]] Collected:  09/06/18 1526     Updated:  09/06/18 1537    Narrative:       CT CHEST W CONTRAST- 9/6/2018 3:05 PM CDT      HISTORY: Neoplasm: head/neck, staging       COMPARISON: None.      DOSE LENGTH PRODUCT: 227 mGy cm. Automated exposure control was also  utilized to decrease patient radiation dose.     TECHNIQUE: Following the intravenous administration of Isovue contrast,  helical CT tomographic images of the chest were acquired. Multiplanar  reformatted images were also provided for review.      FINDINGS:      Neck base: The imaged portion of the neck and thyroid gland is  unremarkable.      Lungs: There is a triangular-shaped, 5 mm nodule in the LEFT upper lobe  on  axial image 55. The lungs are otherwise clear. No pleural effusion is  present. The trachea and bronchial tree are patent.      Heart: Normal in size and appearance. There is no pericardial effusion.      Vasculature: There is aortic atherosclerosis without stenosis or  aneurysm. No coronary arteriosclerosis identified. The great vessels are  normal in appearance. The pulmonary arteries are normal in appearance.     Mediastinum and Lymph nodes: No enlarged axillary, hilar, or mediastinal  lymph nodes.      Bones and soft tissues: No acute osseous or soft tissue abnormality is  seen. There are no worrisome osseous lesions.     Upper abdomen: Findings in the upper abdomen are described in a separate  dictation.        Impression:       1. No evidence of acute cardiopulmonary process.  2. There is a triangular-shaped 5 mm nodule in the LEFT upper lobe. This  most likely represents an intrapulmonary lymph node. This can be  followed on subsequent exams.        This report was finalized on [id number] by Dr. Truman Lynn MD.        Intake/Output    Intake/Output Summary (Last 24 hours) at 09/13/18 0650  Last data filed at 09/13/18 0337   Gross per 24 hour   Intake                0 ml   Output             1800 ml   Net            -1800 ml       Scheduled Meds    atorvastatin 10 mg Oral Nightly   dexamethasone 4 mg Oral Q12H   insulin detemir 20 Units Subcutaneous Nightly   insulin lispro 0-24 Units Subcutaneous 4x Daily With Meals & Nightly   insulin lispro 5 Units Subcutaneous TID With Meals   levETIRAcetam 500 mg Oral Q12H   metFORMIN 500 mg Oral BID With Meals   pantoprazole 40 mg Oral Q AM   sertraline 50 mg Oral Daily       I have reviewed the patient current medications.     Assessment/Plan     Assessment:  1.  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  Type II diabetes, with hyperglycemia, A1C 10.9  (9/8/18)  3.  Gait disturbance  4.  Acute cognitive impairment, resolving  5.  Large cortical cyst involving the upper pole of the left kidney  6.  5 mm nodule in the left upper lobe    Plan:  1.  Glucoses 258, 326, 388, 263.  Received 46 units sliding scale insulin coverage past 24 hours.  2.  Levemir 20 units at at bedtime  3.  Metformin 500 mg twice daily.  Will increase metformin to 1000 mg with supper, home dose.  4.  Decadron 4 mg twice daily per neurosurgery   5.  Keppra per neurosurgery    6.  SCDs for deep vein thrombosis prophylaxis    7.  Continue high-dose sliding scale insulin coverage  8.  Physical therapy.  Patient ambulated in hallway.  Reports leans to left with ambulation.    His wife is his surrogate decision maker  The above documentation resulted from a face-to-face encounter by me Lucita SANCHEZ, [facility].      Discharge Planning: I expect patient to be discharged per neurosurgery     LAURA Valdez   09/13/18   6:50 AM      Chart reviewed.  Patient examined.  Agree with assessment and plan.   Discussed with patient, wife, DERECK Xiong DO  09/13/18  12:33 PM

## 2018-09-14 NOTE — PROGRESS NOTES
[facility] Medicine Services  INPATIENT PROGRESS NOTE    Length of Stay: 8  Date of Admission: 9/6/2018  Primary Care Physician: Saurav Lima MD    Subjective   Chief Complaint: follow up elevated glucoses  HPI   Lying in bed.  Wife in room.  Patient denies nausea, vomiting or abdominal pain.  Had bowel movement this morning.  Denies headache.  Denies chest pain, palpitations or shortness of breath.  Ambulated in the [place] yesterday with physical therapy.  Patient reports leaning to the left with ambulation.  Steroids per neurosurgery.  Adjusted metformin yesterday.    Review of Systems   Constitutional: Positive for activity change and fatigue.   HENT: Negative for congestion and trouble swallowing.    Eyes: Negative for photophobia and visual disturbance.   Respiratory: Negative for shortness of breath and wheezing.    Cardiovascular: Negative for chest pain, palpitations and leg swelling.   Gastrointestinal: Negative for abdominal distention, abdominal pain, diarrhea, nausea and vomiting.   Endocrine: Negative for cold intolerance, heat intolerance and polyuria.   Genitourinary: Negative for dysuria and urgency.   Musculoskeletal: Positive for gait problem.   Skin: Negative for wound.   Allergic/Immunologic: Negative for immunocompromised state.   Neurological: Positive for weakness and headaches.   Hematological: Negative for adenopathy. Does not bruise/bleed easily.   Psychiatric/Behavioral: Negative for agitation and behavioral problems.     All pertinent negatives and positives are as above. All other systems have been reviewed and are negative unless otherwise stated.     Objective    Temp:  [97.6 °F (36.4 °C)-98.1 °F (36.7 °C)] 97.7 °F (36.5 °C)  Heart Rate:  [52-98] 52  Resp:  [16] 16  BP: (112-152)/(42-57) 133/54  Physical Exam  Constitutional: He is oriented to person, place, and time. He appears well-developed and well-nourished.   HENT:   Head: Normocephalic and  atraumatic.   Eyes: Pupils are equal, round, and reactive to light. EOM are normal.   Neck: Normal range of motion. Neck supple.   Cardiovascular: Normal rate, regular rhythm, normal heart sounds and intact distal pulses.  Exam reveals no gallop and no friction rub.    No murmur heard.  Normal sinus rhythm 53-68  Pulmonary/Chest: Effort normal and breath sounds normal. No respiratory distress. He has no wheezes. He has no rales.   No oxygen in use   Abdominal: Soft. Bowel sounds are normal. He exhibits no distension. There is no tenderness.   Genitourinary:   Genitourinary Comments: Voiding   Musculoskeletal: He exhibits no edema or deformity.   SCDs bilateral lower extremities   Neurological: He is alert and oriented to person, place, and time.   Skin: Skin is warm and dry.    craniotomy incision clean and dry   Psychiatric: He has a normal mood and affect. His behavior is normal. Judgment and thought content normal.     Results Review:  I have reviewed the labs, radiology results, and diagnostic studies.    Laboratory Data:     Results from last 7 days  Lab Units 09/13/18  0408 09/12/18  0403 09/11/18  0330   WBC 10*3/mm3 12.23* 12.01* 13.56*   HEMOGLOBIN g/dL 11.7* 11.7* 11.7*   HEMATOCRIT % 34.5* 34.8* 34.2*   PLATELETS 10*3/mm3 164 169 175          Results from last 7 days  Lab Units 09/13/18  0408 09/12/18  0403 09/11/18  0330 09/10/18  0729   SODIUM mmol/L 137 137 137 138   POTASSIUM mmol/L 4.3 4.5 3.9 4.3   CHLORIDE mmol/L 103 104 105 103   CO2 mmol/L 25.0 25.0 24.0 27.0   BUN mg/dL 20 18 20 24*   CREATININE mg/dL 0.88 0.88 0.90 0.90   CALCIUM mg/dL 8.4 8.1* 7.9* 8.4   BILIRUBIN mg/dL  --   --   --  0.5   ALK PHOS U/L  --   --   --  60   ALT (SGPT) U/L  --   --   --  34   AST (SGOT) U/L  --   --   --  24   GLUCOSE mg/dL 258* 242* 185* 224*        Imaging Results (all)     Procedure Component Value Units Date/Time    MRI Brain With & Without Contrast [[id number]] Collected:  09/11/18 4231     Updated:   09/11/18 1402    Narrative:       HISTORY: Status post craniotomy     MRI BRAIN: Multiplanar imaging the brain is performed pre- and post-IV  contrast.     COMPARISON: 9/6/2018     FINDINGS: There are right craniotomy changes. There is pneumocephalus  with greatest amount of air along the anterior right frontal lobe. There  are noted within the frontal horn of the right lateral ventricle. There  is a very small right subdural fluid, likely hemorrhage with surgery.  There is no midline shifting. Blood products are seen within the  operative cavity of the right posterior parietal lobe. There is residual  enhancement tumor seen circumferentially adjacent the operative cavity.  Tumor resected from the posterior medial aspect of the tumor.       Impression:       1. Status post craniotomy with partial resection of the enhancing tumor  of the right posterior parietal lobe. Residual enhancing tumor present  creating mass effect on the posterior horn right lateral ventricle.  Pneumocephalus and small right subdural collection related to recent  surgery. No midline shifting. Basilar cisterns remain visible.  This report was finalized on 09/11/2018 13:59 by Dr. Radha Whitehead MD.    US Renal Bilateral [[id number]] Collected:  09/07/18 1423     Updated:  09/07/18 1427    Narrative:       RENAL ULTRASOUND COMPLETE 9/7/2018 1:16 PM CDT     REASON FOR EXAM: renal cyst; Z74.09-Other reduced mobility; Z74.09-Other  reduced mobility       COMPARISON: None       TECHNIQUE: Multiple longitudinal and transverse realtime sonographic  images of the kidneys and urinary bladder are obtained.      FINDINGS:      RIGHT KIDNEY: 11.9 cm. Normal in size, shape, contour and position. No  solid or cystic masses. The central echo complex is compact with no  evidence for hydronephrosis. No nephrolithiasis or abnormal perinephric  fluid collections . No hydroureter.      LEFT KIDNEY: 11.7 cm. Normal in size, shape, contour and position. There  is a  large cyst involving the upper pole of left kidney measuring 8.9 x  5.9 x 6.9 cm in size.. The central echo complex is compact with no  evidence for hydronephrosis. No nephrolithiasis or abnormal perinephric  fluid collections . No hydroureter.      PELVIS: The bladder is mildly distended with anechoic urine and  demonstrates no significant wall thickening or internal echogenicities.  There is no surrounding ascites.        Impression:       1. Large cortical cyst involving the upper pole of the left kidney.  Otherwise normal renal ultrasound..        This report was finalized on 09/07/2018 14:24 by Dr. Domingo Richey MD.    MRI Brain With & Without Contrast [[id number]] Collected:  09/06/18 1715     Updated:  09/07/18 0858    Narrative:       EXAM: MR BRAIN WITHOUT AND WITH IV CONTRAST 9/7/2018  COMPARISON: MRI brain dated 1/19/2017.      HISTORY: 78 years-old Male. Neoplasm of unspecified behavior of brain     TECHNIQUE:   Routine pulse sequences were obtained of the brain before and after the  administration of IV contrast.      REPORT:   Motion degraded examination.        There is a right supratentorial mass measuring 5.2 x 3.3 x 4.6 cm which  is inseparable from the corpus callosum and the posterior falx..  However, it is unclear if this mass is intra-axial or extra-axial in  location. The corpus callosum appears displaced posteriorly and  laterally. The mass shows diffusion restriction and T2 hypointensity  peripherally with a thick rind, reflecting high cellularity. There are  numerous vessels coursing through the mass. No obvious hemorrhagic  component although. There is T2 hyperintense signal at the center,  nonenhancing that does not suppress on FLAIR, consistent with a  complicated fluid. Of note, the extent of T2/FLAIR abnormal signal  associated with the mass is underwhelming for a GBM or a parenchymal  metastasis.        Mass effect is noted on the right lateral ventricle, with partial  effacement  of the posterior horn, atrium and posterior body of the right  lateral ventricle. The left lateral ventricle is unchanged in size when  compared to MRI of brain dated 1/90/17. Abnormal T2/FLAIR signal in the  bilateral posterior periventricular regions were present on 1/19/2017  and therefore acute hydrocephalus is felt to be less likely. There is no  significant midline shift. The basal cisterns appear preserved at this  time. The flow-voids of the [place] appear maintained  centrally. Grossly, the dural sinuses are patent.          Impression:       1.  Motion degraded examination.  2.  Right parieto-occipital mass as described above. It is unclear if  this is intra-axial (cortical based) or extra-axial. However, the extent  of vasogenic edema is underwhelming for a GBM or an intra-axial  metastasis. Differential consideration does include dural based masses  including, atypical meningioma hemangiopericytoma and dural metastasis.  Aggressive cortical-based glial tumors remain in the differential.  This report was finalized on 09/07/2018 08:55 by Dr. Sharlene Morales MD.    CT Abdomen Pelvis With Contrast [[id number]] Collected:  09/06/18 1536     Updated:  09/06/18 1550    Narrative:       EXAM: CT Abdomen and Pelvis with contrast      9/6/2018 3:36 PM CDT  INDICATION: Neoplasm: abdomen, metastatic, suspected  COMPARISON: None available.  TECHNIQUE:  Abdomen and pelvis were scanned utilizing a multidetector helical  scanner from the diaphragm to the ischial tuberosities after  administration of IV contrast. Coronal and sagittal reformations were  obtained. [Routine protocol is performed.]     Radiation dose equals  mGy-cm.  Automated exposure control dose  reduction technique was implemented.        FINDINGS:     LINES and TUBES: None.     LOWER THORAX: Please see report from CT chest dated same day.     HEPATOBILIARY:  The liver is incompletely visualize. No focal hepatic  lesions.   No liver  laceration.   No biliary ductal dilatation.      GALLBLADDER:  No radiopaque stones or sludge.   No wall thickening.      SPLEEN:  No splenomegaly.    No splenic laceration.      PANCREAS:  No focal masses or ductal dilatation.      ADRENALS:  No adrenal nodules.      KIDNEYS/URETERS:  Kidneys enhance symmetrically.   No hydronephrosis.    There is a 7.2 cm well-circumscribed lesion within the left kidney..  No  stones.      GI TRACT:  No abnormal distention, wall thickening, or evidence of bowel  obstruction.   No acute appendicitis. Colonic diverticulosis, without  evidence of acute diverticulitis..      PELVIC ORGANS/BLADDER:  Unremarkable.      LYMPH NODES:  No lymphadenopathy.      VESSELS:  Atherosclerotic disease..      PERITONEUM / RETROPERITONEUM:  No free air or fluid.      BONES:  Unremarkable.      SOFT TISSUES:  Unremarkable.        Impression:       1.  No discrete mass identified in the abdomen or pelvis.  2.  Left renal cyst.  This report was finalized on 09/06/2018 15:47 by Dr. Sharlene Morales MD.    CT Chest With Contrast [[id number]] Collected:  09/06/18 1526     Updated:  09/06/18 1537    Narrative:       CT CHEST W CONTRAST- 9/6/2018 3:05 PM CDT      HISTORY: Neoplasm: head/neck, staging       COMPARISON: None.      DOSE LENGTH PRODUCT: 227 mGy cm. Automated exposure control was also  utilized to decrease patient radiation dose.     TECHNIQUE: Following the intravenous administration of Isovue contrast,  helical CT tomographic images of the chest were acquired. Multiplanar  reformatted images were also provided for review.      FINDINGS:      Neck base: The imaged portion of the neck and thyroid gland is  unremarkable.      Lungs: There is a triangular-shaped, 5 mm nodule in the LEFT upper lobe  on axial image 55. The lungs are otherwise clear. No pleural effusion is  present. The trachea and bronchial tree are patent.      Heart: Normal in size and appearance. There is no pericardial effusion.       Vasculature: There is aortic atherosclerosis without stenosis or  aneurysm. No coronary arteriosclerosis identified. The great vessels are  normal in appearance. The pulmonary arteries are normal in appearance.     Mediastinum and Lymph nodes: No enlarged axillary, hilar, or mediastinal  lymph nodes.      Bones and soft tissues: No acute osseous or soft tissue abnormality is  seen. There are no worrisome osseous lesions.     Upper abdomen: Findings in the upper abdomen are described in a separate  dictation.        Impression:       1. No evidence of acute cardiopulmonary process.  2. There is a triangular-shaped 5 mm nodule in the LEFT upper lobe. This  most likely represents an intrapulmonary lymph node. This can be  followed on subsequent exams.        This report was finalized on [id number] by Dr. Truman Lynn MD.          Intake/Output    Intake/Output Summary (Last 24 hours) at 09/14/18 0656  Last data filed at 09/13/18 0943   Gross per 24 hour   Intake              200 ml   Output                0 ml   Net              200 ml       Scheduled Meds    atorvastatin 10 mg Oral Nightly   dexamethasone 4 mg Oral Q12H   insulin detemir 20 Units Subcutaneous Nightly   insulin lispro 0-24 Units Subcutaneous 4x Daily With Meals & Nightly   insulin lispro 8 Units Subcutaneous TID With Meals   levETIRAcetam 500 mg Oral Q12H   metFORMIN 1,000 mg Oral Daily With Dinner   metFORMIN 500 mg Oral Daily With Breakfast   pantoprazole 40 mg Oral Q AM   sertraline 50 mg Oral Daily       I have reviewed the patient current medications.     Assessment/Plan   Assessment:  1.  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  Type II diabetes, with hyperglycemia, A1C 10.9 (9/8/18)  3.  Gait disturbance  4.  Acute cognitive impairment, resolving  5.  Large cortical cyst involving the upper pole of the left kidney  6.  5  mm nodule in the left upper lobe    Plan:  1.  Glucoses 229, 219, 290, 258.  2.  Continue Levemir 20 units at bedtime.  8 units lispro with meals.  3.  Sliding scale insulin coverage in addition to scheduled insulin with meals.  4.  Metformin increased to home dose 500 mg with breakfast and 1000 mg with dinner on 9/14  5.  Suspect glucoses will trend downward as Decadron begins to be weaned per neurosurgery  6.  Decadron 4 mg twice daily per neurosurgery  7.  Keppra per neurosurgery  8.  SCDs for deep vein thrombosis prophylaxis  9.  Physical therapy.  Patient reports leaning to the left with ambulation.  10.  Referral to [facility] rehabilitation.. Awaiting precertification and acceptance.    The above documentation resulted from a face-to-face encounter by me Lucita SANCHEZ, [facility].      Discharge Planning: I expect patient to be discharged to [facility] rehab if accepted.    LAURA Valdez   09/14/18   6:56 AM    Chart reviewed. Patient examined.   Agree with assessment and plan.  Catie Xiong DO  09/14/18  12:06 PM

## 2018-09-14 NOTE — PLAN OF CARE
Problem: Patient Care Overview  Goal: Plan of Care Review  Outcome: Ongoing (interventions implemented as appropriate)   09/14/18 1038   Coping/Psychosocial   Plan of Care Reviewed With patient;spouse   Plan of Care Review   Progress improving   OTHER   Outcome Summary Pt in bed; agreeable to OT. Pt performed bed mobility with SBA; VCs to remove SCDs prior to sitting EOB and to slow speed due ot sitting to quickly. Pt performed functional mobility bed<>bathroom with SBA/CGA for safety. Pt t/f to shower/tub bench to perform LB dressing with SBA; CGA for standing aspect. Pt performed dynamic standing/reaching balance activity while unsupported standing at sink (SBA/CGA) required. Pt performed leaning/reaching side to side; required cues for returning to midline 3/20 reaches. Pt able to self correct when in front of a mirror; (leans L when not in front of mirror). Pt then performed community mobility activity in room; bathroom<>door 2x focusing on walking around objects/furniture (CGA required). Pt walked into chair on his L side and required VCs/tactile to stop; anticipate w/o cues pt would have fallen over chair. Continue OT POC.Recommend inpatient rehab upon d/c.

## 2018-09-14 NOTE — PLAN OF CARE
Problem: Patient Care Overview  Goal: Plan of Care Review  Outcome: Ongoing (interventions implemented as appropriate)   09/14/18 0444   Coping/Psychosocial   Plan of Care Reviewed With patient   Plan of Care Review   Progress no change   OTHER   Outcome Summary vss; alert and oriented x 4; slightly confused at times; impulsive; bed alarm on; no c/o pain; right head incision florina with no drainage; spouse at bedside; gait unsteady; urinary urgency; up x 1 to br; continue to monitor     Goal: Individualization and Mutuality  Outcome: Ongoing (interventions implemented as appropriate)    Goal: Discharge Needs Assessment  Outcome: Ongoing (interventions implemented as appropriate)    Goal: Interprofessional Rounds/Family Conf  Outcome: Ongoing (interventions implemented as appropriate)      Problem: Fall Risk (Adult)  Goal: Absence of Fall  Outcome: Ongoing (interventions implemented as appropriate)      Problem: Pain, Acute (Adult)  Goal: Acceptable Pain Control/Comfort Level  Outcome: Ongoing (interventions implemented as appropriate)

## 2018-09-14 NOTE — PROGRESS NOTES
Continued Stay Note   [place]     Patient Name: Dwight Cobian  MRN: [id number]  Today's Date: 9/14/2018    Admit Date: 9/6/2018          Discharge Plan     Row Name 09/14/18 1146       Plan    Plan BOAZ spoke with Shaunna Lucio [facility]. Still no word from [facility]. pending precert. She will notify as soon as she gets word. Precert could be completed as late as 1600 hr today and still be able to transfer to [facility] this evening or tomorrow. Md to be notified when precert is complete. Patient anxious to move to [facility]. Eddie with [facility] is calling to see if she can expedite process.     Received call from Naveed that precert is complete and patient can admit today. Ava HALL notified of bed availability.              Discharge Codes    No documentation.       Expected Discharge Date and Time     Expected Discharge Date Expected Discharge Time    Sep 14, 2018             Diamond Chen RN

## 2018-09-14 NOTE — PROGRESS NOTES
"Dwight Cobian  78 y.o.      Chief complaint:   No complaints    Subjective  No events    Temp:  [97.6 °F (36.4 °C)-98.1 °F (36.7 °C)] 98.1 °F (36.7 °C)  Heart Rate:  [52-98] 54  Resp:  [16] 16  BP: (112-152)/(42-67) 124/67      Objective:  General Appearance:  Comfortable, well-appearing, in no acute distress and not in pain.    Vital signs: (most recent): Blood pressure 124/67, pulse 54, temperature 98.1 °F (36.7 °C), temperature source Oral, resp. rate 16, height 182.9 cm (72\"), weight 79.5 kg (175 lb 3.2 oz), SpO2 96 %.  Vital signs are normal.  No fever.    Output: Producing urine.    Lungs:  Normal effort and normal respiratory rate.    Heart: Normal rate.  Regular rhythm.    Chest: Symmetric chest wall expansion.   Extremities: Normal range of motion.    Skin:  Warm and dry.        Neurologic Exam     Mental Status   Oriented to person, place, and time.   Attention: normal. Concentration: normal.   Speech: speech is normal   Level of consciousness: alert    Cranial Nerves   Cranial nerves II through XII intact.     CN III, IV, VI   Pupils are equal, round, and reactive to light.  Extraocular motions are normal.     Motor Exam   Muscle bulk: normal    Strength   Strength 5/5 throughout.     Sensory Exam   Light touch normal.       Principal Problem:    Malignant neoplasm of brain (CMS/HCC)  Active Problems:    Neoplasm, brain (CMS/HCC)      Lab Results (last 24 hours)     Procedure Component Value Units Date/Time    POC Glucose Once [[id number]]  (Abnormal) Collected:  09/14/18 0342    Specimen:  Blood Updated:  09/14/18 0353     Glucose 229 (H) mg/dL      Comment: : [id number] Mann ThucMeter ID: [id number]       POC Glucose Once [[id number]]  (Abnormal) Collected:  09/13/18 2125    Specimen:  Blood Updated:  09/13/18 2137     Glucose 219 (H) mg/dL      Comment: : [id number] Mann ThucMeter ID: [id number]       POC Glucose Once [[id number]]  (Abnormal) Collected:  09/13/18 1705    Specimen:  Blood " Updated:  09/13/18 1716     Glucose 162 (H) mg/dL      Comment: : [id number] Infante CarolynMeter ID: [id number]       POC Glucose Once [[id number]]  (Abnormal) Collected:  09/13/18 1155    Specimen:  Blood Updated:  09/13/18 1207     Glucose 290 (H) mg/dL      Comment: : [id number] Infante CarolynMeter ID: [id number]       POC Glucose Once [[id number]]  (Abnormal) Collected:  09/13/18 0819    Specimen:  Blood Updated:  09/13/18 0830     Glucose 266 (H) mg/dL      Comment: : [id number] Infante CarolynMeter ID: [id number]                 Plan:   Continue with physical therapy and occupational therapy.  Waiting for rehabilitation placement.  Continue with hospitalist plan of care    King Laguna, APRN

## 2018-09-14 NOTE — PROGRESS NOTES
Case Management Discharge Note    Final Note: NOTIFIED ORAL OF PT'S D/C TODAY.  PT. WILL TRANSPORT TO [facility] VIA FAMILY CAR.     Destination     No service has been selected for the patient.      Durable Medical Equipment     No service has been selected for the patient.      Dialysis/Infusion     No service has been selected for the patient.      Home Medical Care     No service has been selected for the patient.      Social Care     No service has been selected for the patient.             Final Discharge Disposition Code: 62 - inpatient rehab facility

## 2018-09-14 NOTE — PROGRESS NOTES
Continued Stay Note   [place]     Patient Name: Dwight Cobian  MRN: [id number]  Today's Date: 9/14/2018    Admit Date: 9/6/2018          Discharge Plan     Row Name 09/14/18 1146       Plan    [facility]. pending precert. She will notify as soon as she gets word. Precert could be completed as late as 1600 hr today and still be able to transfer to [facility] this evening or tomorrow. Md to be notified when precert is complete. Patient anxious to move to [facility]. Eddie with [facility] is calling to see if she can expedite process.               Discharge Codes    No documentation.       Expected Discharge Date and Time     Expected Discharge Date Expected Discharge Time    Sep 14, 2018             Diamond Chen RN

## 2018-09-14 NOTE — PROGRESS NOTES
69year old male arrived to room 827. Oriented patient and wife on room, unit and rehab routines. Instructed not to get up out of bed without calling for assistance of nursing staff. Verbalized an understanding.

## 2018-09-14 NOTE — DISCHARGE SUMMARY
Date of Discharge:  9/14/2018    Discharge Diagnosis: Brain tumor    Presenting Problem/History of Present Illness  Neoplasm, brain (CMS/HCC) [D49.6]       Hospital Course  Patient is a 78 y.o. male presented with history of dizziness and progressive cognitive issues and confusion.  There is also some irrational behavior noted and difficulty with ambulation.  He is having difficulty with performing simple day-to-day tasks.  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  The patient was directly admit to the hospital on 09/06/2018.  The patient had other medical issues including diabetes that was out of control upon admission to the hospital.  His blood sugars was over 500.  We did have hospitalist to see the patient and make sure the patient was suitable hence stable for surgery.  The patient did go to the operating room on 09/10/2018 for craniotomy and tumor resection.  The patient tolerated procedure well.  He is taking some improvement but is still having some cognitive and ambulation needs.  It is felt at this time the patient is stable and suitable to be discharged to [facility] for further rehabilitation.  He is ambulating.  Denies any bowel or bladder incontinence.  He is tolerating by mouth.  He is voiding spontaneously.  He can be transferred via private vehicle to the rehabilitation facility      Procedures Performed  Procedure(s):  CRANIOTOMY FOR TUMOR RIGHT       Consults:   Consults     Date and Time Order Name Status Description    9/7/2018 1356 Inpatient Hospitalist Consult Completed               Condition on Discharge:  Stable   Vital Signs  Temp:  [97.7 °F (36.5 °C)-98.1 °F (36.7 °C)] 97.7 °F (36.5 °C)  Heart Rate:  [52-98] 66  Resp:  [16] 16  BP: (112-133)/(42-67) 116/48    Physical Exam:   Physical Exam   Constitutional: He is oriented to person, place, and  time. He appears well-developed and well-nourished.   HENT:   Head: Normocephalic.   Eyes: Pupils are equal, round, and reactive to light. Conjunctivae, EOM and lids are normal.   Neck: Normal range of motion.   Cardiovascular: Normal rate, regular rhythm and normal heart sounds.    Pulmonary/Chest: Effort normal and breath sounds normal.   Abdominal: Normal appearance.   Musculoskeletal: Normal range of motion.   Neurological: He is alert and oriented to person, place, and time. He has normal strength and normal reflexes. He displays normal reflexes. No cranial nerve deficit or sensory deficit. GCS eye subscore is 4. GCS verbal subscore is 5. GCS motor subscore is 6.   Skin: Skin is warm.   Scalp incision clean dry and intact   Psychiatric: He has a normal mood and affect. His speech is normal and behavior is normal. Thought content normal. Cognition and memory are normal.        Neurologic Exam     Mental Status   Oriented to person, place, and time.   Attention: normal. Concentration: normal.   Speech: speech is normal   Level of consciousness: alert    Cranial Nerves   Cranial nerves II through XII intact.     CN III, IV, VI   Pupils are equal, round, and reactive to light.  Extraocular motions are normal.     Motor Exam   Muscle bulk: normal    Strength   Strength 5/5 throughout.     Sensory Exam   Light touch normal.          Discharge Disposition  Rehab Facility or Unit (DC - External)    Discharge Medications     Discharge Medications      New Medications      Instructions Start Date   dexamethasone 4 MG tablet  Commonly known as:  DECADRON   2 mg, Oral, Every 12 Hours Scheduled, For 2 days then DC      HYDROcodone-acetaminophen 5-325 MG per tablet  Commonly known as:  NORCO   1 tablet, Oral, Every 8 Hours PRN      insulin detemir 100 UNIT/ML injection  Commonly known as:  LEVEMIR   20 Units, Subcutaneous, Nightly      insulin lispro 100 UNIT/ML injection  Commonly known as:  humaLOG   0-24 Units,  Subcutaneous, 4 Times Daily With Meals & Nightly      insulin lispro 100 UNIT/ML injection  Commonly known as:  humaLOG   8 Units, Subcutaneous, 3 Times Daily With Meals      pantoprazole 40 MG EC tablet  Commonly known as:  PROTONIX   40 mg, Oral, Daily         Continue These Medications      Instructions Start Date   levETIRAcetam 500 MG tablet  Commonly known as:  KEPPRA   500 mg, Oral, Every 12 Hours Scheduled      levocetirizine 5 MG tablet  Commonly known as:  XYZAL   5 mg, Oral, Every Evening      LORazepam 1 MG tablet  Commonly known as:  ATIVAN   0.5 mg, Oral, Every 8 Hours PRN      metFORMIN 500 MG tablet  Commonly known as:  GLUCOPHAGE   500 mg, Oral, Daily With Breakfast      metFORMIN 500 MG tablet  Commonly known as:  GLUCOPHAGE   1,000 mg, Oral, Daily With Dinner      pravastatin 40 MG tablet  Commonly known as:  PRAVACHOL   40 mg, Oral, Nightly      sertraline 50 MG tablet  Commonly known as:  ZOLOFT   50 mg, Oral, Daily      traZODone 50 MG tablet  Commonly known as:  DESYREL   50 mg, Oral, Nightly         Stop These Medications    meclizine 25 MG tablet  Commonly known as:  ANTIVERT            Discharge Diet:     Activity at Discharge:   Activity Instructions     Activity as Tolerated             Follow-up Appointments  No future appointments.  Additional Instructions for the Follow-ups that You Need to Schedule     Call MD With Problems / Concerns    As directed      Instructions: If pt has increase in back or leg pain, loss of motor strength or sensation, drainage from wound, fever, loss of consciousness, seizures, neck or arm pain, N/T, N/V, difficulty swallowing or breathing, call office or go to ER    Order Comments:  Instructions: If pt has increase in back or leg pain, loss of motor strength or sensation, drainage from wound, fever, loss of consciousness, seizures, neck or arm pain, N/T, N/V, difficulty swallowing or breathing, call office or go to ER          Discharge Follow-up with PCP     As directed      Currently Documented PCP:  Saurav Lima MD  PCP Phone Number:  [phone]    Follow Up Details:  follow up after dc from rehab         Discharge Follow-up with Specialty: Aziza; 3 Weeks    As directed      Specialty:  Aziza    Follow Up:  3 Weeks               Test Results Pending at Discharge   Order Current Status    Tissue Pathology Exam In process           [place], LAURA  09/14/18  1:03 PM    Time: Discharge 30 min

## 2018-09-14 NOTE — THERAPY TREATMENT NOTE
Acute Care - Occupational Therapy Treatment Note  [facility]     Patient Name: Dwight Cobian  : 1940  MRN: [id number]  Today's Date: 2018  Onset of Illness/Injury or Date of Surgery: 18  Date of Referral to OT: 09/10/18  Referring Physician: Dr. Ervin     Admit Date: 2018       ICD-10-CM ICD-9-CM   1. Neoplasm, brain (CMS/HCC) D49.6 239.6   2. Impaired mobility Z74.09 799.89   3. Impaired mobility and ADLs Z74.09 799.89   4. Malignant neoplasm of brain (CMS/HCC) C71.9 191.9   5. 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)   • Unsteady gait   • Forgetfulness   • Neoplasm, brain (CMS/HCC)   • Malignant neoplasm of brain (CMS/HCC)     Past Medical History:   Diagnosis Date   • Arthritis    • Diabetes mellitus (CMS/HCC)    • Fractured skull (CMS/HCC) 2016    garage door fell on him     Past Surgical History:   Procedure Laterality Date   • CRANIOTOMY FOR TUMOR Right 9/10/2018    Procedure: CRANIOTOMY FOR TUMOR RIGHT;  Surgeon: Mika Ervin MD;  Location: [facility];  Service: Neurosurgery       Therapy Treatment          Rehabilitation Treatment Summary     Row Name 18 0940             Treatment Time/Intention    Discipline occupational therapy assistant  -MM      Document Type therapy note (daily note)  -MM      Subjective Information complains of;fatigue  -MM      Mode of Treatment occupational therapy  -MM      Patient/Family Observations wife present; pt in bed   -MM      Care Plan Review care plan/treatment goals reviewed  -MM2      Comment decreased awareness to L side during functional mobility   -MM2      Existing Precautions/Restrictions fall  -MM2      Recorded by [MM] Nancy Andrea OTA 18 0946  [MM2] Nancy Andrea OTA 18 1032      Row Name 18 0940             Safety  Issues, Functional Mobility    Safety Issues Affecting Function (Mobility) insight into deficits/self awareness  -MM      Impairments Affecting Function (Mobility) balance;visual/perceptual  -MM      Comment, Safety Issues/Impairments (Mobility) leans to L; no LOB this date   -MM      Recorded by [MM] Nancy Andrea OTA 09/14/18 1032      Row Name 09/14/18 0940             Bed Mobility Assessment/Treatment    Bed Mobility Assessment/Treatment bed mobility (all) activities  -MM      [place] Level (Bed Mobility) standby assist  -MM      Comment (Bed Mobility) HOB elevated; VCs due to wanting to get up with SCDs on; sits up quickly   -MM      Recorded by [MM] Nancy Andrea OTA 09/14/18 1032      Row Name 09/14/18 0940             Functional Mobility    Functional Mobility- Ind. Level verbal cues required;contact guard assist   SBA  -MM      Functional Mobility- Safety Issues loses balance backward;balance decreased during turns   leans to L  -MM      Functional Mobility- Comment SBA/CGA; bed<>bathroom; In room to and from door 2x focusing on avoiding objects working on dynamic balance for community mobility activities; pt required to be stopped due to bumping into chair; anticipate w/o VCs and tactile cues to stop pt would have tripped and fallen  -MM      Recorded by [MM] Nancy Andrea OTA 09/14/18 1032      Row Name 09/14/18 0940             Transfer Assessment/Treatment    Transfer Assessment/Treatment sit-stand transfer;stand-sit transfer;shower transfer  -MM      Comment (Transfers) Transfer to shower/tub bench to change clothes due to having visitors in room   -MM      Recorded by [MM] Nancy Andrea OTA 09/14/18 1032      Row Name 09/14/18 0940             Sit-Stand Transfer    Sit-Stand [place] (Transfers) stand by assist;contact guard  -MM      Recorded by [MM] Nancy Andrea OTA 09/14/18 1032      Row Name 09/14/18 0940             Stand-Sit Transfer    Stand-Sit [place]  (Transfers) stand by assist;contact guard  -MM      Recorded by [MM] Nancy Andrea OTA 09/14/18 1032      Row Name 09/14/18 0940             Shower Transfer    Type (Shower Transfer) sit-stand;stand-sit  -MM      [place] Level (Shower Transfer) stand by assist;contact guard  -MM      Recorded by [MM] Nancy Andrea OTA 09/14/18 1032      Row Name 09/14/18 0940             ADL Assessment/Intervention    BADL Assessment/Intervention lower body dressing  -MM      Recorded by [MM] Nancy Andrea OTA 09/14/18 1032      Row Name 09/14/18 0940             Lower Body Dressing Assessment/Training    Lower Body Dressing [place] Level doff;don;pants/bottoms;socks;contact guard assist  -MM      Lower Body Dressing Position supported sitting;unsupported standing  -MM      Comment (Lower Body Dressing) SBA when seated; CGA when standing   -MM      Recorded by [MM] Nancy Andrea OTA 09/14/18 1032      Row Name 09/14/18 0940             BADL Safety/Performance    Impairments, BADL Safety/Performance balance;trunk/postural control;coordination  -MM      Cognitive Impairments, BADL Safety/Performance awareness, need for assistance;impulsivity;insight into deficits/self awareness;judgment;safety precaution awareness  -MM      Skilled BADL Treatment/Intervention BADL process/adaptation training;cognitive/safety deficit modifications;compensatory training;environmental modifications  -MM      Progress in BADL Status cueing required;effort and initiation;use of compensatory strategies  -MM      Recorded by [MM] Nancy Andrea OTA 09/14/18 1032      Row Name 09/14/18 0940             Dynamic Standing Balance    Level of [place], Reaches Outside Midline (Standing, Dynamic Balance) contact guard assist  -MM      Time Able to Maintain Position, Reaches Outside Midline (Standing, Dynamic Balance) 30 to 45 seconds  -MM      Comment, Reaches Outside Midline (Standing, Dynamic Balance) unsupported standing in  front of mirror; reaching/leaning side to side and then returning to midline; performed for 10 minutes to increase act long and also to increase patient's awareness of leaning to the L side/balance difficulties. Pt required VCs 3/20 attempts to return to midline. Pt presented with L lean at times; pt able to identify when leaning to the L while in front of a mirror   -MM      Recorded by [MM] Nancy Andrea OTA 09/14/18 1032      Row Name 09/14/18 0940             Positioning and Restraints    Pre-Treatment Position in bed  -MM      Post Treatment Position bed  -MM      In Bed fowlers;call light within reach;encouraged to call for assist;exit alarm on;with family/caregiver;side rails up x2;SCD pump applied  -MM      Recorded by [MM] Nancy Andrea OTA 09/14/18 1032      Row Name 09/14/18 0940             Pain Scale: Numbers Pre/Post-Treatment    Pain Scale: Numbers, Pretreatment 0/10 - no pain  -MM      Pain Scale: Numbers, Post-Treatment 0/10 - no pain  -MM      Recorded by [MM] Nancy Andrea OTA 09/14/18 1032      Row Name                Wound 09/10/18 1602 Right head incision    Wound - Properties Group Date first assessed: 09/10/18 [MICHAELA] Time first assessed: 1602 [MICHAELA] Side: Right [MICHAELA] Location: head [MICHAELA] Type: incision [MICHAELA] Recorded by:  [MICHAELA] Fanny Obrien RN 09/10/18 1602    Row Name 09/14/18 0940             Plan of Care Review    Plan of Care Reviewed With patient  -MM      Recorded by [MM] Nancy Andrea OTA 09/14/18 1032      Row Name 09/14/18 0940             Outcome Summary/Treatment Plan (OT)    Daily Summary of Progress (OT) progress toward functional goals is good  -MM      Recorded by [MM] Nancy Andrea OTA 09/14/18 1032        User Key  (r) = Recorded By, (t) = Taken By, (c) = Cosigned By    Initials Name Effective Dates Discipline    MICHAELA Fanny Obrien RN 08/02/16 -  Nurse    MM Nancy Andrea OTA 08/01/18 -  OT        Wound 09/10/18 1602 Right head incision (Active)    Dressing Appearance dry;intact;no drainage 9/13/2018  9:00 PM   Closure Open to air 9/13/2018  9:00 PM   Drainage Amount none 9/13/2018  9:00 PM   Dressing Care, Wound open to air 9/13/2018  9:00 PM         Occupational Therapy Education     Title: PT OT SLP Therapies (Done)     Topic: Occupational Therapy (Done)     Point: ADL training (Done)     Description: Instruct learner(s) on proper safety adaptation and remediation techniques during self care or transfers.   Instruct in proper use of assistive devices.   Learning Progress Summary     Learner Status Readiness Method Response Comment Documented by    Patient Done Acceptance E,TB VU OT POC, benefits of activity, ADL retraining, t/f training  09/14/18 1035     Done Acceptance E,TB VU t/f training, home mods, OT POC, dynamic reaching tasks, GM/FM tasks and benefits, benefits of activity  09/13/18 1119     Done Acceptance E,TB VU OT POC, benefits of activity, inpatient rehab/reasons for continued therapy, ADL retraining, dynamic balance training, t/f training  09/12/18 1541     Done Acceptance E VU safery awareness, balance, ADL, OT POC  09/11/18 1611     Done Acceptance E VU,DU,NR Pt and family educated regarding OT POC, fall precautions, purpose/use of a gait belt, and body mechanics to increase safety/independence in completing ADL's.  09/07/18 1359    Significant Other Done Acceptance E,TB VU OT POC, benefits of activity, ADL retraining, t/f training  09/14/18 1035          Point: Precautions (Done)     Description: Instruct learner(s) on prescribed precautions during self-care and functional transfers.   Learning Progress Summary     Learner Status Readiness Method Response Comment Documented by    Patient Done Acceptance E,TB VU OT POC, benefits of activity, ADL retraining, t/f training  09/14/18 1035     Done Acceptance E,TB VU t/f training, home mods, OT POC, dynamic reaching tasks, GM/FM tasks and benefits, benefits of activity  09/13/18  1119     Done Acceptance E,TB VU OT POC, benefits of activity, inpatient rehab/reasons for continued therapy, ADL retraining, dynamic balance training, t/f training  09/12/18 1541     Done Acceptance E VU safery awareness, balance, ADL, OT POC  09/11/18 1611     Done Acceptance E VU,DU,NR Pt and family educated regarding OT POC, fall precautions, purpose/use of a gait belt, and body mechanics to increase safety/independence in completing ADL's.  09/07/18 1359    Significant Other Done Acceptance E,TB VU OT POC, benefits of activity, ADL retraining, t/f training  09/14/18 1035          Point: Body mechanics (Done)     Description: Instruct learner(s) on proper positioning and spine alignment during self-care, functional mobility activities and/or exercises.   Learning Progress Summary     Learner Status Readiness Method Response Comment Documented by    Patient Done Acceptance E,TB VU t/f training, home mods, OT POC, dynamic reaching tasks, GM/FM tasks and benefits, benefits of activity  09/13/18 1119     Done Acceptance E,TB VU OT POC, benefits of activity, inpatient rehab/reasons for continued therapy, ADL retraining, dynamic balance training, t/f training  09/12/18 1541     Done Acceptance E VU safery awareness, balance, ADL, OT POC  09/11/18 1611     Done Acceptance E VU,DU,NR Pt and family educated regarding OT POC, fall precautions, purpose/use of a gait belt, and body mechanics to increase safety/independence in completing ADL's.  09/07/18 1359                      User Key     Initials Effective Dates Name Provider Type Discipline     08/28/18 -  Mariama Dwyer, OTR/L Occupational Therapist OT     07/03/18 -  Kirstie Hernandez OT Student OT Student OT     08/01/18 -  Nancy Andrea OTA Occupational Therapy Assistant OT                OT Recommendation and Plan  Outcome Summary/Treatment Plan (OT)  Daily Summary of Progress (OT): progress toward functional goals is good  Daily  Summary of Progress (OT): progress toward functional goals is good  Plan of Care Review  Plan of Care Reviewed With: patient, spouse  Plan of Care Reviewed With: patient, spouse  Outcome Summary: Pt in bed; agreeable to OT. Pt performed bed mobility with SBA; VCs to remove SCDs prior to sitting EOB and to slow speed due ot sitting to quickly. Pt performed functional mobility bed<>bathroom with SBA/CGA for safety. Pt t/f to shower/tub bench to perform LB dressing with SBA; CGA for standing aspect. Pt performed dynamic standing/reaching balance activity while unsupported standing at sink (SBA/CGA) required. Pt performed leaning/reaching side to side; required cues for returning to midline 3/20 reaches. Pt able to self correct when in front of a mirror; more difficulty when not(leans L). Pt then performed community mobility activity in room; bathroom<>door 2x focusing on walking around objects/furniture (CGA required). Pt walked into chair on his L side and required VCs/tactile to stop; anticipate w/o cues pt would have fallen over chair. Continue OT POC.Recommend inpatient rehab upon d/c.          Outcome Measures     Row Name 09/14/18 0940 09/13/18 1016 09/12/18 1440       How much help from another is currently needed...    Putting on and taking off regular lower body clothing? 3  -MM 3  -MM 3  -MM    Bathing (including washing, rinsing, and drying) 3  -MM 3  -MM 2  -MM    Toileting (which includes using toilet bed pan or urinal) 3  -MM 3  -MM 3  -MM    Putting on and taking off regular upper body clothing 4  -MM 3  -MM 3  -MM    Taking care of personal grooming (such as brushing teeth) 4  -MM 4  -MM 4  -MM    Eating meals 4  -MM 4  -MM 4  -MM    Score 21  -MM 20  -MM 19  -MM    Row Name 09/12/18 1300 09/11/18 1300          How much help from another person do you currently need...    Turning from your back to your side while in flat bed without using bedrails? 4  -AH 4  -MIMI (r) KE (t) MIMI (c)     Moving from lying  on back to sitting on the side of a flat bed without bedrails? 3  - 4  -MIMI (r) KE (t) MIMI (c)     Moving to and from a bed to a chair (including a wheelchair)? 3  - 3  -MIMI (r) KE (t) MIMI (c)     Standing up from a chair using your arms (e.g., wheelchair, bedside chair)? 3  - 3  -MIMI (r) KE (t) MIMI (c)     Climbing 3-5 steps with a railing? 2  - 3  -MIMI (r) KE (t) MIMI (c)     To walk in hospital room? 3  - 3  -MIMI (r) KE (t) MIMI (c)     AM-PAC 6 Clicks Score 18  - 20  -MIMI (r) KE (t)        Functional Assessment    Outcome Measure Options  — AM-PAC 6 Clicks Basic Mobility (PT)  -MIMI (r) KE (t) MIMI (c)       User Key  (r) = Recorded By, (t) = Taken By, (c) = Cosigned By    Initials Name Provider Type     Vicky Cosby, PTA Physical Therapy Assistant    Bam Krishnamurthy, PT DPT Physical Therapist    Aurea Isaac, PT Student PT Student    Nancy Smith OTA Occupational Therapy Assistant           Time Calculation:         Time Calculation- OT     Row Name 09/14/18 0940             Time Calculation- OT    OT Start Time 0940  -MM      OT Stop Time 1008  -MM      OT Time Calculation (min) 28 min  -MM      Total Timed Code Minutes- OT 28 minute(s)  -MM      OT Received On 09/14/18  -MM         Timed Charges    77700 - OT Therapeutic Activity Minutes 10  -MM      47337 - OT Self Care/Mgmt Minutes 18  -MM        User Key  (r) = Recorded By, (t) = Taken By, (c) = Cosigned By    Initials Name Provider Type    MM Nancy Andrea OTA Occupational Therapy Assistant           Therapy Suggested Charges     Code   Minutes Charges    29734 (CPT®) Hc Ot Neuromusc Re Education Ea 15 Min      59370 (CPT®) Hc Ot Ther Proc Ea 15 Min      48435 (CPT®) Hc Ot Therapeutic Act Ea 15 Min 10 1    65570 (CPT®) Hc Ot Manual Therapy Ea 15 Min      21299 (CPT®) Hc Ot Iontophoresis Ea 15 Min      25800 (CPT®) Hc Ot Elec Stim Ea-Per 15 Min      66254 (CPT®) Hc Ot Ultrasound Ea 15 Min      16624 (CPT®) Hc Ot Self  Care/Mgmt/Train Ea 15 Min 18 1    Total  28 2        Therapy Charges for Today     Code Description Service Date Service Provider Modifiers Qty    97621432269 HC OT THERAPEUTIC ACT EA 15 MIN 9/13/2018 Nancy Andrea OTA GO, NATIVIDADX 1    82069653391 HC OT SELF CARE/MGMT/TRAIN EA 15 MIN 9/13/2018 Nancy Andrea OTA GO, KX 2    76679635300 HC OT THERAPEUTIC ACT EA 15 MIN 9/14/2018 Nancy Andrea OTA GO, KX 1    09820972845 HC OT SELF CARE/MGMT/TRAIN EA 15 MIN 9/14/2018 Nancy Andrea OTA GO KX 1          OT G-codes  OT Professional Judgement Used?: Yes  OT Functional Scales Options: AM-PAC 6 Clicks Daily Activity (OT)  Score: 18  Functional Limitation: Self care  Self Care Current Status (): At least 40 percent but less than 60 percent impaired, limited or restricted  Self Care Goal Status (): At least 20 percent but less than 40 percent impaired, limited or restricted    JAROD Waite  9/14/2018

## 2018-09-15 PROBLEM — G47.00 INSOMNIA: Status: ACTIVE | Noted: 2018-01-01

## 2018-09-15 NOTE — PROGRESS NOTES
Physical Therapy EVALUATION Note  DATE:  9/15/2018  NAME:  Jovan rDiscoll  :    (69 y. o.,male)  MRN:  [id number]    HEIGHT:  Height: 6' (182.9 cm)  WEIGHT:  Weight: 170 lb (77.1 kg)    PATIENT DIAGNOSIS(ES):    Diagnosis: 9/10 RIGHT PARIETAL CRANIOTOMY FOR TUMOR EXCISION  RESTRICTIONS/PRECAUTIONS:                OVERALL  ORIENTATION STATUS:     PAIN:     NEUROLOGICAL     Movements Are Fluid And Coordinated: No        POSTURE  Posture: Fair (LEFT LATERAL LEAN WITH SLIGHT FLEXED FORWARD POSTURE)  STRENGTH  Strength RLE  Strength RLE: WFL  Strength LLE  Strength LLE: Exception  Comment: GROSSLY 4/5  ROM        BALANCE  Sitting - Static: Good, -  Standing - Dynamic: Fair, -     ACTIVITY TOLERANCE  Activity Tolerance: Patient limited by fatigue, Patient limited by endurance     BED MOBILITY  Bed Mobility  Rolling: Stand by assistance  Supine to Sit: Stand by assistance  Sit to Supine: Stand by assistance  TRANSFERS  Transfers  Sit to Stand: Contact guard assistance  Stand to sit: Contact guard assistance  Bed to Chair: Moderate assistance (POSTEROLATERAL LEFT LOB)  WHEELCHAIR PROPULSION  Propulsion 1  Propulsion: Manual  Method: DAYTON JAVIER  Level of Assistance: Moderate assistance  Description/ Details: VEERED LEFT, DIFFICULTY COORDINATING WITH LEFT UE.    DISTANCE: 25 FT  AMBULATION  Ambulation 1  Device: No Device  Assistance: Maximum assistance, Moderate assistance  Quality of Gait: RIGHT TRUNK ROTATION, EXCESSIVE RIGHT STEP LENGTH WITH SHORTENED LEFT STEP. LEFT LATERAL LEAN WITH LEFT DEVIATION EACH CYCLE.   LOB X2 WITH MOD A TO CORRECT  Distance: 25 FT  Comments: PATIENT STATED HE DID NOT NEED TO AMBULATION WITH LOULOU Lance   N/T  FIM SCORES    CURRENT  Bed, Chair, Wheel Chair: 3 - Requires 25-49% assistance to transfer  Walk: 1 - Total Assistance Walks < 50 feet OR requires two or more people OR patient performs < 25% of locomotion effort  Wheel Chair: 1 - Total Assistance Operates wheelchair < 50 feet OR requires two or more people OR patient performs < 25% of locomotion effort  Stairs: 0 - Activity Does not Occur ( 0 only for the admission assessment)  ADMIT  Bed, Chair, Wheel Chair: 3 - Requires 25-49% assistance to transfer   Walk: 1 - Total Assistance Walks < 50 feet OR requires two or more people OR patient performs < 25% of locomotion effort   Wheel Chair: 1 - Total Assistance Operates wheelchair < 50 feet OR requires two or more people OR patient performs < 25% of locomotion effort   Stairs: 0 - Activity Does not Occur ( 0 only for the admission assessment)      GOALS  GOAL: Bed, Chair, Wheel Chair: 6  GOAL: Walk/Wheel Chair: 6  GOAL: Stairs: 5     GOALS:  Short term goals  Time Frame for Short term goals: 2 WEEKS  Short term goal 1: TF FIM 4  Short term goal 2: AMB FIM 4  Short term goal 3: WC FIM 5  Short term goal 4: STEP FIM 1  Short term goal 5: HEP SBA    Long term goals  Time Frame for Long term goals : 4 WEEKS  Long term goal 1: TF FIM 6  Long term goal 2: AMB FIM 6  Long term goal 3: WC FIM 6  Long term goal 4: STEP FIM 5E  Long term goal 5: HEP INDEP  HOME LIVING:                    ASSESSMENT (IMPAIRMENTS/BARRIERS): Body structures, Functions, Activity limitations: Decreased functional mobility , Decreased ADL status, Decreased ROM, Decreased strength, Decreased safe awareness, Decreased endurance, Decreased cognition, Decreased balance, Decreased coordination  Assessment: DECREASED SAFETY AWARENESS WITH DENIAL OF DEFICITS. LEFT FEILD CUT WITH INATTENTION. MAX CUEING FOR PROPER STEP TO GAIT SEQUENCING, STAYING WITHIN ROLLING WALKER, AND USING ENVIRONMENTAL CUES FOR POSTURE  Activity Tolerance: Patient limited by fatigue, Patient limited by endurance  Specific instructions for Next Treatment: DYNAMIC BALANCE EXERCISES  EDUCATION:  Patient Education: 1. 3 POINT GAIT  2.  FALL PREVENTION  Barriers to Learning: DECREASED SAFETY AWARENESS  PLAN:  Plan  Times per week: 5-6  Times per day: FT UNEVEN SURFACES  Walking 10 Feet on Uneven Surfaces  Reason if not Attempted: Safety concerns  CARE Score: 88  1 STEP  1 Step (Curb)  Reason if not Attempted: Safety concerns  CARE Score: 88  4 STEPS  4 Steps  Reason if not Attempted: Safety concerns  CARE Score: 88  12 STEPS  12 Steps  Reason if not Attempted: Safety concerns  CARE Score: 88  PICKING UP OBJECT  Picking Up Object  Reason if not Attempted: Safety concerns  CARE Score: 88  WHEELCHAIR 50 FT  Wheel 50 Feet with Two Turns  Comment: 25 FT  Reason if not Attempted: Safety concerns  CARE Score: 88  WHEELCHAIR 150 FT         LAST TREATMENT TIME  PT Individual Minutes  Time In: 1000  Time Out: 1100  Minutes: 60

## 2018-09-15 NOTE — PLAN OF CARE
Problem: Falls - Risk of:  Goal: Will remain free from falls  Will remain free from falls   Outcome: Ongoing  Patient  remained free from falls this shift. Fall precautions in place. Bed alarm in use. [employer] monitor in room. Will continue to monitor. Goal: Absence of physical injury  Absence of physical injury   Outcome: Ongoing      Problem: Serum Glucose Level - Abnormal:  Goal: Ability to maintain appropriate glucose levels has stabilized  Ability to maintain appropriate glucose levels has stabilized  Outcome: Not Met This Shift  Glucose not within normal range prior to lunch requiring sliding scale Humalog insulin coverage. Problem: Mood - Altered:  Goal: Mood stable  Mood stable  Outcome: Ongoing  Patient interacts with staff appropriately today. Use of diversion activities in order to decrease depression. Problem: Pain:  Goal: Pain level will decrease  Pain level will decrease  Outcome: Ongoing  Patient denies pain at this time. Problem: Anxiety:  Goal: Level of anxiety will decrease  Level of anxiety will decrease  Outcome: Ongoing   Maintained a calm environment. Avoided caffeine, sugar and other stimulants today. Problem: Infection - Surgical Site:  Goal: Will show no infection signs and symptoms  Will show no infection signs and symptoms  Outcome: Ongoing  Patient incision on right side of head remains free from infection with no redness or drainage noted.

## 2018-09-15 NOTE — PROGRESS NOTES
Occupational Therapy   Occupational Therapy Initial Assessment  Date: 9/15/2018   Patient Name: Camilla Sellers  MRN: [id number]     : 1940    Date of Service: 9/15/2018    Discharge Recommendations:  IP Rehab         Patient Diagnosis(es): There were no encounter diagnoses. has a past medical history of Chronic allergic rhinitis; Gastroesophageal reflux disease without esophagitis; Hyperlipidemia; Hypertension; Lumbar disc disease; and Type 2 diabetes mellitus without complication ([facility] [address] has no past surgical history on file.     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)  Pain Assessment  Patient Currently in Pain: No  Height and Weight  Weight: 172 lb (78 kg)  Weight Method: Actual;Standing scale  BMI (Calculated): 23.4  Oxygen Therapy  SpO2: 96 %  O2 Device: None (Room air)  Social/Functional History  Social/Functional History  Lives With: Spouse  Type of Home: House  Home Layout: Two level       Objective   Vision: Impaired  Hearing: Within functional limits          Balance  Sitting Balance: Supervision  Standing Balance: Minimal assistance  Standing Balance  Sit to stand: Minimal assistance  Stand to sit: Minimal assistance  Functional Mobility  Functional - Mobility Device: Rolling Walker  Activity: To/from bathroom  Assist Level: Minimal assistance  Functional Mobility Comments: assistance required for positioning on RW, left inattention         09/15/18 1115   SELF-CARE   Eating 5   GOAL: Eating 7   Grooming 5   GOAL: Grooming 7   Bathing 4   GOAL: Bathing 6   Dressing-Upper 4   GOAL: Dressing-Upper 7   Dressing-Lower 3   GOAL: Dressing-Lower 6   Toileting 4   GOAL: Toileting 6   TRANSFERS   Toilet Transfer 4 term goal 2: complete LB dressing with supervision  Short term goal 3: complete overall bathing with supervision  Short term goal 4: complete visual scanning acts using BITS system x 5 occasions to increase awareness of left side during ADLs  Short term goal 5: complete 1-2 handed static standing task for 3 mins with supervision  Short term goal 6: complete ambulatory home making task with CGA  Long term goals  Time Frame for Long term goals :   2 weeks  Long term goal 1: complete overall toileting with modified independence  Long term goal 2: complete overall dressing with modified independence  Long term goal 3: complete overall bathing with modified independence   Long term goal 4: complete simple ambulatory home making task with modified independence  Long term goal 5: complete HEP with independence  Patient Goals   Patient goals : return home       Therapy Time   Individual Concurrent Group Co-treatment   Time In 1115         Time Out 1215         Minutes 60         Timed Code Treatment Minutes: 53 Minutes     Electronically signed by Lucille Medina OT on 9/15/2018 at 4:42 PM

## 2018-09-15 NOTE — PLAN OF CARE
Problem: Patient Care Overview  Goal: Plan of Care Review  Outcome: Ongoing (interventions implemented as appropriate)   09/14/18 1500   Coping/Psychosocial   Plan of Care Reviewed With patient   Plan of Care Review   Progress improving   OTHER   Outcome Summary Pt oriented to all but time, but impulsive at times. Gait unsteady. Denies pain. IV d/c'ed. D/c'ed to inpatient rehab facility. Transported by spouse       Problem: Fall Risk (Adult)  Goal: Absence of Fall  Outcome: Ongoing (interventions implemented as appropriate)      Problem: Pain, Acute (Adult)  Goal: Acceptable Pain Control/Comfort Level  Outcome: Ongoing (interventions implemented as appropriate)

## 2018-09-15 NOTE — H&P
David Sanders MD, 0.5 mg at 09/14/18 2343    metFORMIN (GLUCOPHAGE) tablet 500 mg, 500 mg, Oral, BID WC, David Sanders MD, 500 mg at 09/15/18 0854    pantoprazole (PROTONIX) tablet 40 mg, 40 mg, Oral, Daily, David Sanders MD, 40 mg at 09/15/18 0854    pravastatin (PRAVACHOL) tablet 40 mg, 40 mg, Oral, Nightly, David Sanders MD, 40 mg at 09/14/18 1950    sertraline (ZOLOFT) tablet 50 mg, 50 mg, Oral, Daily, David Sanders MD, 50 mg at 09/15/18 0854    traZODone (DESYREL) tablet 50 mg, 50 mg, Oral, Nightly, David Sanders MD, 50 mg at 09/14/18 1951    acetaminophen (TYLENOL) tablet 650 mg, 650 mg, Oral, Q4H PRN, David Sanders MD    magnesium hydroxide (MILK OF MAGNESIA) 400 MG/5ML suspension 30 mL, 30 mL, Oral, Daily PRN, David Sanders MD    docusate sodium (COLACE) capsule 100 mg, 100 mg, Oral, BID PRN, David Sanders MD    bisacodyl (DULCOLAX) suppository 10 mg, 10 mg, Rectal, Daily PRN, David Sanders MD    insulin lispro (HUMALOG) injection vial 0-6 Units, 0-6 Units, Subcutaneous, TID WC, David Sanders MD, 4 Units at 09/15/18 0855    insulin lispro (HUMALOG) injection vial 0-3 Units, 0-3 Units, Subcutaneous, Nightly, David Sanders MD, 3 Units at 09/14/18 2108    glucose (GLUTOSE) 40 % oral gel 15 g, 15 g, Oral, PRN, David Sanders MD    dextrose 50 % solution 12.5 g, 12.5 g, Intravenous, PRN, David Sanders MD    glucagon (rDNA) injection 1 mg, 1 mg, Intramuscular, PRN, David Sanders MD    dextrose 5 % solution, 100 mL/hr, Intravenous, PRN, David Sanders MD    insulin glargine (LANTUS) injection vial 20 Units, 20 Units, Subcutaneous, Nightly, David Sanders MD, 20 Units at 09/14/18 0435    Allergies:  Patient has no known allergies. Social History:   TOBACCO:   reports that he has never smoked. He has never used smokeless tobacco.  ETOH:   reports that he does not drink alcohol.     Family History:   Family History   Problem Relation Age of Onset    Diabetes Mother            Physical

## 2018-09-15 NOTE — PROGRESS NOTES
[facility]  SPEECH THERAPY  [facility] 8 REHAB UNIT  Speech  Language  Cognitive Evaluation    TIME   Time In: 1300  Time Out: 1400  Minutes: 60  Timed Code Treatment Minutes: 30 Minutes      Name: Heidi Juares  YOB: 1940  Gender: male  Referring Physician: Matt Murillo DIAGNOSIS(ES):    Diagnosis: 9/10 RIGHT PARIETAL CRANIOTOMY FOR TUMOR EXCISION    History of Present Illness/Injury: This 69 y.o. male pt admitted to [facility] on 9/6/18 from Dr. Basia Pruett office. Pt had been referred to Dr. Bernadette Gasca by his PCP, Dr. Marielle Hubbard. Pt had presented at Dr. Dwayne Mariano office w/1 month hx of lowback pain, dizziness and per pt's wife, processing and cognitive issues. He also complained that his L leg wasn't functioning properly causing several LOB's, but was able to catch himself before falling. 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 He was admitted to the hospital for further evaluation. Upon admission he did have some agitation and confusion. CT's of abd/pelvis and chest were done  and were neg. He was started on Keppra & IV Decadron. He is a diabetic and the steroids have been causing his glucose to run from the 600's to 300's. His A1C was 10.9 on 9/8/18. He was only on metforman at home but was started on Levemir 20 units nightly an a mod to high dose sliding scale insulin. On 9/10/18, he underwent a R parietal craniotomy w/ image guided stereotactic navigation. He tolerated the procedure well and was moved out of ICU on 9/12/18. His steroids are being weaned. He is alert and oriented, but does have decreased safety awareness and decreased awareness on the L. Pathology reports are not back at this time. He is now medically stable and is participating w/therapy.   He is ready to begin rehab w/plan of returning home after rehab

## 2018-09-15 NOTE — PROGRESS NOTES
[place] sys camera placed in room at this time. Pt got up to the bathroom without calling for assistance. Gait unsteady and pt leans to left. Explained to pt the reason for monitor.

## 2018-09-15 NOTE — PLAN OF CARE
injury/management      [] Medication Use   [] Surgical intervention   [x] Safety   [] Body mechanics and or joint protection   [] Health maintenance         PHYSICAL THERAPY:  Goals:                  Short term goals  Time Frame for Short term goals: 2 WEEKS  Short term goal 1: TF FIM 4  Short term goal 2: AMB FIM 4  Short term goal 3: WC FIM 5  Short term goal 4: STEP FIM 1  Short term goal 5: HEP SBA            Long term goals  Time Frame for Long term goals : 4 WEEKS  Long term goal 1: TF FIM 6  Long term goal 2: AMB FIM 6  Long term goal 3: WC FIM 6  Long term goal 4: STEP FIM 5E  Long term goal 5: HEP INDEP  These goals were reviewed with this patient at the time of assessment and Ashwini Guzmán is in agreement.      Plan of Care: Frequency:  [] 5 days per week, 90 minutes per day                             [x]  5 days per week, 60 minutes per day               Current Treatment Recommendations: Strengthening, ROM, Balance Training, Functional Mobility Training, Transfer Training, Endurance Training, Wheelchair Mobility Training, Gait Training, Stair training, Home Exercise Program, Patient/Caregiver Education & Training, Safety Education & Training, Equipment Evaluation, Education, & procurement      OCCUPATIONAL THERAPY:  Goals:             Short term goals  Time Frame for Short term goals: 1 week  Short term goal 1: complete UB dressing with supervision  Short term goal 2: complete LB dressing with supervision  Short term goal 3: complete overall bathing with supervision  Short term goal 4: complete visual scanning acts using BITS system x 5 occasions to increase awareness of left side during ADLs  Short term goal 5: complete 1-2 handed static standing task for 3 mins with supervision  Short term goal 6: complete ambulatory home making task with CGA :  Long term goals  Time Frame for Long term goals :   2 weeks  Long term goal 1: complete overall toileting with modified independence  Long term goal 2: complete overall dressing with modified independence  Long term goal 3: complete overall bathing with modified independence   Long term goal 4: complete simple ambulatory home making task with modified independence  Long term goal 5: complete HEP with independence : These goals were reviewed with this patient at the time of assessment and Thanh Barboza is in agreement    Plan of Care: Frequency:   [] 5 days per week, 90 minutes per day     [x] 5 days per week, 60 minutes per day        Patient Education: fall       Plan  Specific instructions for Next Treatment: BITs system  Current Treatment Recommendations: Self-Care / ADL, Safety Education & Training, Cognitive/Perceptual Training, Functional Mobility Training, Cognitive Reorientation, Balance Training, Home Management Training, Patient/Caregiver Education & Training          Treatments may include IADL retraining, strengthening, safety education and training, patient/caregiver education and training, equipment evaluation/ training/procurement, neuromuscular reeducation, wheelchair mobility training. SPEECH THERAPY:   Plan of Care and Goals:   LTG Goal 1: Patient will improve cognitive linguistic skills for effective communication in all settings and safety with ADL tasks to return to independent living. FIM Goals: Comprehension: GOAL: Comprehension: 6  Expression: GOAL: Expression: 7  Social: GOAL: Social Interaction: 7  Problem Solving: GOAL: Problem Solvin  Memory: GOAL: Memory: 5                    Short-term Goals  Timeframe for Short-term Goals: 2-3 weeks  Goal 1: Patient will complete visual scanning tasks to improve safety for functional ADL's by completing cancellation, tracking, reading, etc tasks with 100% accuracy. Goal 2: Patient will complete orientation tasks (time, place, biographical information) with 100% accuracy given minimal verbal cues and prompts.    Goal 3: Patient will recall 5 facts at paragraph level reading to improve short term recall with minimal prompts/verbal cues. Goal 4: Patient will recall/utilize safety precautions to increase safety with functional ADL tasks to return to independent living. Goal 5: Patient will recall 5 recent/daily events with minimal verbal cues and prompts to improve orientation of environment and situation. Timeframe for Short-term Goals: 2-3 weeks     Plan of Care:  Frequency:   [x] 5 days per week, 60 minutes per day                            [] Not appropriate for Speech Therapy  Treatments may include speech/language/communication therapy, cognitive training, group therapy, education, and/or dysphagia therapy based on the above goals. These goals were reviewed with this patient at the time of assessment and Karrietamara Dunaway is in agreement. CASE MANAGEMENT:  Goals:   Assist patient/family with discharge planning, patient/family counseling,  and coordination with insurance during ARU stay.   Patient Goals: return to what I could do before, no loss of balance, walk myself, take care of self   Current  FIMS and Goals:   SELF-CARE  Eatin - Feeds self with setup/supervision/cues and/or requires only setup/supervision/cues to perform tube feedings  GOAL: Eatin  Groomin - Requires setup/cues to do all tasks  GOAL: Groomin  Bathin - Able to bathe 8-9 areas  GOAL: Bathin  Dressing-Upper: 4 - Requires assist with buttons/zippers only and/or requires assist with one arm only  GOAL: Dressing-Upper: 7  Dressing-Lower: 3 - Requires assist with 2-3 parts of dressing  GOAL: Dressing-Lower: 6  Toiletin - Requires steadying assistance only  GOAL: Toiletin  Bowel Level of Assistance: 4- Requires Minimal assistance to manage or place device, helper inserts suppository without digital stimulation, patient performs 75% or more of the bowel management tasks  TRANSFERS  Bed, Chair, Wheel Chair: 3 - Requires 25-49% assistance to transfer  GOAL: Bed, Expected length of stay prior to the supervised level of   functional discharge to home with home care is 13 days. Assessment and Plan:     1. Status post resection of brain tumoron Decadron/Keppra  2. Diabeteson medicationsmonitor blood sugar  3. Seizure prophylaxison Keppra  4. GIbowel regimen/proton pump inhibitor  5. Hyperlipidemiastatin  6. Mood disorderon Zoloft  7. Insomniaon trazodone  8. Pain control[place] when necessary  9. PT/Ot  10.  DVT prophylaxisSCDs               Case Mgmt: Electronically signed by DEREK Gray on 9/17/2018 at 3:27 PM      OT: Electronically signed by Nikkie Murillo OT on 9/15/2018 at 4:41 PM    PT:Electronically signed by Isael Palomino PT on 9/15/2018 at 12:04 PM      RN:   Electronically signed by Duane Anglin RN on 9/15/2018 at 1:39 PM      ST: Electronically signed by GOYO Rebollar on 9/15/2018 at 4:27 PM

## 2018-09-15 NOTE — THERAPY DISCHARGE NOTE
Acute Care - Physical Therapy Discharge Summary  [facility]       Patient Name: Dwight Cobian  : 1940  MRN: [id number]    Today's Date: 9/15/2018  Onset of Illness/Injury or Date of Surgery: 18    Date of Referral to PT: 18  Referring Physician: Dr. Ervin       Admit Date: 2018      PT Recommendation and Plan    Visit Dx:    ICD-10-CM ICD-9-CM   1. Neoplasm, brain (CMS/HCC) D49.6 239.6   2. Impaired mobility Z74.09 799.89   3. Impaired mobility and ADLs Z74.09 799.89   4. Malignant neoplasm of brain (CMS/HCC) C71.9 191.9   5. Impaired functional mobility, balance, gait, and endurance Z74.09 V49.89             Outcome Measures     Row Name 18 1100 18 0940 18 1016       How much help from another person do you currently need...    Turning from your back to your side while in flat bed without using bedrails? 3  -NW  --  --    Moving from lying on back to sitting on the side of a flat bed without bedrails? 3  -NW  --  --    Moving to and from a bed to a chair (including a wheelchair)? 3  -NW  --  --    Standing up from a chair using your arms (e.g., wheelchair, bedside chair)? 3  -NW  --  --    Climbing 3-5 steps with a railing? 3  -NW  --  --    To walk in hospital room? 3  -NW  --  --    AM-PAC 6 Clicks Score 18  -NW  --  --       How much help from another is currently needed...    Putting on and taking off regular lower body clothing?  -- 3  -MM 3  -MM    Bathing (including washing, rinsing, and drying)  -- 3  -MM 3  -MM    Toileting (which includes using toilet bed pan or urinal)  -- 3  -MM 3  -MM    Putting on and taking off regular upper body clothing  -- 4  -MM 3  -MM    Taking care of personal grooming (such as brushing teeth)  -- 4  -MM 4  -MM    Eating meals  -- 4  -MM 4  -MM    Score  -- 21  -MM 20  -MM       Functional Assessment    Outcome Measure Options AM-PAC 6 Clicks Basic Mobility (PT)  -NW  --  --    Row Name 18 1440 18 1300          How much  help from another person do you currently need...    Turning from your back to your side while in flat bed without using bedrails?  -- 4  -AH     Moving from lying on back to sitting on the side of a flat bed without bedrails?  -- 3  -AH     Moving to and from a bed to a chair (including a wheelchair)?  -- 3  -AH     Standing up from a chair using your arms (e.g., wheelchair, bedside chair)?  -- 3  -AH     Climbing 3-5 steps with a railing?  -- 2  -AH     To walk in hospital room?  -- 3  -AH     AM-PAC 6 Clicks Score  -- 18  -AH        How much help from another is currently needed...    Putting on and taking off regular lower body clothing? 3  -MM  --     Bathing (including washing, rinsing, and drying) 2  -MM  --     Toileting (which includes using toilet bed pan or urinal) 3  -MM  --     Putting on and taking off regular upper body clothing 3  -MM  --     Taking care of personal grooming (such as brushing teeth) 4  -MM  --     Eating meals 4  -MM  --     Score 19  -MM  --       User Key  (r) = Recorded By, (t) = Taken By, (c) = Cosigned By    Initials Name Provider Type     Vicky Cosby, BELKIS Physical Therapy Assistant    Mary Ann Headley PTA Physical Therapy Assistant    MM Nancy Andrea OTA Occupational Therapy Assistant            Therapy Suggested Charges     Code   Minutes Charges    32570 (CPT®) Hc Pt Neuromusc Re Education Ea 15 Min      67934 (CPT®) Hc Pt Ther Proc Ea 15 Min 10 1    11337 (CPT®) Hc Gait Training Ea 15 Min 15 1    15755 (CPT®) Hc Pt Therapeutic Act Ea 15 Min      91527 (CPT®) Hc Pt Manual Therapy Ea 15 Min      09673 (CPT®) Hc Pt Iontophoresis Ea 15 Min      20987 (CPT®) Hc Pt Elec Stim Ea-Per 15 Min      97446 (CPT®) Hc Pt Ultrasound Ea 15 Min      43573 (CPT®) Hc Pt Self Care/Mgmt/Train Ea 15 Min      70068 (CPT®) Hc Pt Prosthetic (S) Train Initial Encounter, Each 15 Min      13127 (CPT®) Hc Pt Orthotic(S)/Prosthetic(S) Encounter, Each 15 Min      58401 (CPT®) Hc Orthotic(S)  Mgmt/Train Initial Encounter, Each 15min      Total  25 2                PT Rehab Goals     Row Name 09/15/18 0825             Transfer Goal 1 (PT)    Progress/Outcome (Transfer Goal 1, PT) goal not met  -NW         Gait Training Goal 1 (PT)    Activity/Assistive Device (Gait Training Goal 1, PT) gait (walking locomotion);turning, left;turning, right;diminish gait deviation  -NW      [place] (Gait Training Goal 1, PT) standby assist  -NW      Distance (Gait Goal 1, PT) 200 feet  -NW      Time Frame (Gait Training Goal 1, PT) long term goal (LTG);by discharge  -NW      Progress/Outcome (Gait Training Goal 1, PT) goal not met  -NW        User Key  (r) = Recorded By, (t) = Taken By, (c) = Cosigned By    Initials Name Provider Type Discipline    NW Mary Ann Stone PTA Physical Therapy Assistant PT          Therapy Charges for Today     Code Description Service Date Service Provider Modifiers Qty    86939413998 HC GAIT TRAINING EA 15 MIN 9/14/2018 Mary Ann Stone PTA GP, KX 1    83698400560 HC PT THER PROC EA 15 MIN 9/14/2018 Mary Ann Stone PTA GP, KX 1          PT Discharge Summary  Anticipated Discharge Disposition (PT): inpatient rehabilitation facility  Reason for Discharge: Discharge from facility  Outcomes Achieved: Refer to plan of care for updates on goals achieved  Discharge Destination: Inpatient rehabilitation facility      Mary Ann Stone PTA   9/15/2018

## 2018-09-16 NOTE — THERAPY DISCHARGE NOTE
Acute Care - Occupational Therapy Discharge Summary  [facility]     Patient Name: Dwight Cobian  : 1940  MRN: [id number]    Today's Date: 2018  Onset of Illness/Injury or Date of Surgery: 18    Date of Referral to OT: 09/10/18  Referring Physician: Dr. Ervin       Admit Date: 2018        OT Recommendation and Plan    Visit Dx:    ICD-10-CM ICD-9-CM   1. Neoplasm, brain (CMS/HCC) D49.6 239.6   2. Impaired mobility Z74.09 799.89   3. Impaired mobility and ADLs Z74.09 799.89   4. Malignant neoplasm of brain (CMS/HCC) C71.9 191.9   5. Impaired functional mobility, balance, gait, and endurance Z74.09 V49.89                     OT Rehab Goals     Row Name 18 1258             Dressing Goal 1 (OT)    Activity/Assistive Device (Dressing Goal 1, OT) dressing skills, all  -TS      [place]/Cues Needed (Dressing Goal 1, OT) minimum assist (75% or more patient effort)  -TS      Time Frame (Dressing Goal 1, OT) by discharge  -TS      Progress/Outcome (Dressing Goal 1, OT) goal not met  -TS         Toileting Goal 1 (OT)    Activity/Device (Toileting Goal 1, OT) toileting skills, all  -TS      [place] Level/Cues Needed (Toileting Goal 1, OT) contact guard assist  -TS      Time Frame (Toileting Goal 1, OT) by discharge  -TS      Progress/Outcome (Toileting Goal 1, OT) goal not met  -TS          Coordination Goal 1 (OT)     Activity/Assistive Device (Coordination Goal 1, OT)   --  FM task;GM task  -MW  [place] Level/Cues Needed (Coordination Goal 1, OT)   --  supervision required;verbal cues required  -MW  Time Frame (Coordination Goal 1, OT)   --  by discharge  -MW  Progress/Outcomes (Coordination Goal 1, OT)   --  goal not met TS     User Key  (r) = Recorded By, (t) = Taken By, (c) = Cosigned By    Initials Name Provider Type Discipline    TS Qiana Heart, SMITH/L Occupational Therapy Assistant OT                Outcome Measures     Row Name 18 1100 18 0940           How much help from another person do you currently need...    Turning from your back to your side while in flat bed without using bedrails? 3  -NW  --     Moving from lying on back to sitting on the side of a flat bed without bedrails? 3  -NW  --     Moving to and from a bed to a chair (including a wheelchair)? 3  -NW  --     Standing up from a chair using your arms (e.g., wheelchair, bedside chair)? 3  -NW  --     Climbing 3-5 steps with a railing? 3  -NW  --     To walk in hospital room? 3  -NW  --     AM-PAC 6 Clicks Score 18  -NW  --        How much help from another is currently needed...    Putting on and taking off regular lower body clothing?  -- 3  -MM     Bathing (including washing, rinsing, and drying)  -- 3  -MM     Toileting (which includes using toilet bed pan or urinal)  -- 3  -MM     Putting on and taking off regular upper body clothing  -- 4  -MM     Taking care of personal grooming (such as brushing teeth)  -- 4  -MM     Eating meals  -- 4  -MM     Score  -- 21  -MM        Functional Assessment    Outcome Measure Options AM-PAC 6 Clicks Basic Mobility (PT)  -NW  --       User Key  (r) = Recorded By, (t) = Taken By, (c) = Cosigned By    Initials Name Provider Type    NW Mary Ann Stone PTA Physical Therapy Assistant    MM Nancy Andrea OTA Occupational Therapy Assistant          Therapy Suggested Charges     Code   Minutes Charges    58971 (CPT®) Hc Ot Neuromusc Re Education Ea 15 Min      66464 (CPT®) Hc Ot Ther Proc Ea 15 Min      62715 (CPT®) Hc Ot Therapeutic Act Ea 15 Min 10 1    98466 (CPT®) Hc Ot Manual Therapy Ea 15 Min      45961 (CPT®) Hc Ot Iontophoresis Ea 15 Min      27240 (CPT®) Hc Ot Elec Stim Ea-Per 15 Min      89592 (CPT®) Hc Ot Ultrasound Ea 15 Min      29473 (CPT®) Hc Ot Self Care/Mgmt/Train Ea 15 Min 18 1    Total  28 2              OT Discharge Summary  Reason for Discharge: Discharge from facility  Outcomes Achieved: Refer to plan of care for updates on goals  achieved  Discharge Destination: Inpatient rehabilitation facility      WILLIAM Marcial  9/16/2018

## 2018-09-16 NOTE — PROGRESS NOTES
BID PRN, Ranjit Nava MD    bisacodyl (DULCOLAX) suppository 10 mg, 10 mg, Rectal, Daily PRN, Ranjit Nava MD    insulin lispro (HUMALOG) injection vial 0-6 Units, 0-6 Units, Subcutaneous, TID WC, Ranjit Nava MD, 3 Units at 09/16/18 0752    insulin lispro (HUMALOG) injection vial 0-3 Units, 0-3 Units, Subcutaneous, Nightly, Ranjit Nava MD, 3 Units at 09/14/18 2108    glucose (GLUTOSE) 40 % oral gel 15 g, 15 g, Oral, PRN, Ranjit Nava MD    dextrose 50 % solution 12.5 g, 12.5 g, Intravenous, PRN, Ranjit Nava MD    glucagon (rDNA) injection 1 mg, 1 mg, Intramuscular, PRN, Ranjit Nava MD    dextrose 5 % solution, 100 mL/hr, Intravenous, PRN, Ranjit Nava MD    insulin glargine (LANTUS) injection vial 20 Units, 20 Units, Subcutaneous, Nightly, Ranjit Nava MD, 20 Units at 09/15/18 2028    Allergies:  Patient has no known allergies. Social History:   TOBACCO:   reports that he has never smoked. He has never used smokeless tobacco.  ETOH:   reports that he does not drink alcohol. Family History:   Family History   Problem Relation Age of Onset    Diabetes Mother          PHYSICAL EXAM:  /61   Pulse 57   Temp 96.6 °F (35.9 °C)   Resp 16   Ht 6' (1.829 m)   Wt 172 lb (78 kg)   SpO2 94%   BMI 23.33 kg/m²     Constitutional  well developed, well nourished. Eyes  conjunctiva normal.   Ear, nose, throat - No scars, masses, or lesions over external nose or ears, no atrophy of tongue  Neck-symmetric, no masses noted, no jugular vein distension  Respiration- chest wall appears symmetric, good expansion,   normal effort without use of accessory muscles  Musculoskeletal  no significant wasting of muscles noted, no bony deformities  Extremities-no clubbing, cyanosis or edema  Skin  warm, dry, and intact. No rash, erythema, or pallor.   Psychiatric  mood, affect, and behavior appear normal.      Neurological exam  Awake, alert, fluent oriented appropriate affect  Attention and concentration appear appropriate  Recent and remote memory appears unremarkable  Speech normal without dysarthria  No clear issues with language of fund of knowledge     Cranial Nerve Exam     CN III, IV,VI-EOMI, No nystagmus, conjugate eye movements, no ptosis    CN VII-no facial assymetry       Motor Exam  antigravity throughout upper and lower extremities bilaterally      Tremors- no tremors in hands or head noted     Gait  Not tested    Nursing/pcp notes, imaging,labs and vitals reviewed. PT,OT and/or speech notes reviewed    Lab Results   Component Value Date    WBC 7.4 09/15/2018    HGB 11.5 (L) 09/15/2018    HCT 36.8 (L) 09/15/2018    MCV 92.0 09/15/2018     09/15/2018     Lab Results   Component Value Date     09/15/2018    K 4.1 09/15/2018     09/15/2018    CO2 25 09/15/2018    BUN 25 (H) 09/15/2018    CREATININE 0.9 09/15/2018    GLUCOSE 262 (H) 09/15/2018    CALCIUM 8.6 (L) 09/15/2018    PROT 5.2 (L) 09/15/2018    LABALBU 3.1 (L) 09/15/2018    BILITOT 0.3 09/15/2018    ALKPHOS 72 09/15/2018    AST 14 09/15/2018    ALT 26 09/15/2018    LABGLOM >60 09/15/2018   No results found for: INR, PROTIME      9/15/2018 11:57 AM         []Manual[]Template  []Copied     Physical Therapy EVALUATION Note  DATE:  9/15/2018  NAME:  Matt Anand  :  1940  (69 y. o.,male)  MRN:  [id number]     HEIGHT:  Height: 6' (182.9 cm)  WEIGHT:  Weight: 170 lb (77.1 kg)     PATIENT DIAGNOSIS(ES):    Diagnosis: 9/10 RIGHT PARIETAL CRANIOTOMY FOR TUMOR EXCISION  RESTRICTIONS/PRECAUTIONS:    OVERALL  ORIENTATION STATUS:  PAIN:  NEUROLOGICAL  Movements Are Fluid And Coordinated: No  POSTURE  Posture: Fair (LEFT LATERAL LEAN WITH SLIGHT FLEXED FORWARD POSTURE)  STRENGTH  Strength RLE  Strength RLE: WFL  Strength LLE  Strength LLE: Exception  Comment: GROSSLY 4/5  ROM  BALANCE  Sitting - Static: Good, -  Standing - Dynamic: Fair, -  ACTIVITY TOLERANCE  Activity Tolerance: Patient limited by fatigue, Patient limited by endurance  BED MOBILITY  Bed Mobility  Rolling: Stand by assistance  Supine to Sit: Stand by assistance  Sit to Supine: Stand by assistance  TRANSFERS  Transfers  Sit to Stand: Contact guard assistance  Stand to sit: Contact guard assistance  Bed to Chair: Moderate assistance (POSTEROLATERAL LEFT LOB)  WHEELCHAIR PROPULSION  Propulsion 1  Propulsion: Manual  Method: RUE, LUE  Level of Assistance: Moderate assistance  Description/ Details: VEERED LEFT, DIFFICULTY COORDINATING WITH LEFT UE.    DISTANCE: 25 FT  AMBULATION  Ambulation 1  Device: No Device  Assistance: Maximum assistance, Moderate assistance  Quality of Gait: RIGHT TRUNK ROTATION, EXCESSIVE RIGHT STEP LENGTH WITH SHORTENED LEFT STEP. LEFT LATERAL LEAN WITH LEFT DEVIATION EACH CYCLE.   LOB X2 WITH MOD A TO CORRECT  Distance: 25 FT  Comments: PATIENT STATED HE DID NOT NEED TO AMBULATION WITH AMALU Santizo   N/T  FIM SCORES     CURRENT  Bed, Chair, Wheel Chair: 3 - Requires 25-49% assistance to transfer  Walk: 1 - Total Assistance Walks < 50 feet OR requires two or more people OR patient performs < 25% of locomotion effort  Wheel Chair: 1 - Total Assistance Operates wheelchair < 50 feet OR requires two or more people OR patient performs < 25% of locomotion effort  Stairs: 0 - Activity Does not Occur ( 0 only for the admission assessment)  ADMIT  Bed, Chair, Wheel Chair: 3 - Requires 25-49% assistance to transfer   Walk: 1 - Total Assistance Walks < 50 feet OR requires two or more people OR patient performs < 25% of locomotion effort   Wheel Chair: 1 - Total Assistance Operates wheelchair < 50 feet OR requires two or more people OR patient performs < 25% of locomotion effort   Stairs: 0 - Activity Does not Occur ( 0 only for the admission assessment)   GOALS  GOAL: Bed, Chair, Wheel Chair: 6  GOAL: Walk/Wheel Chair: 6  GOAL: Stairs: 5  GOALS:  Short term goals  Time Frame for Short term goals: 2 WEEKS  Short term goal 1: TF FIM 4  Short term goal PLANS:  Discharge Recommendations: Continue to assess pending progress  ELOS:  4 WEEKS           IRF/KRYSTA     ROLLING  Roll Left and Right  Assistance Needed: Supervision  CARE Score: 4  SIT TO SUPINE  Sit to Lying  Assistance Needed: Supervision  CARE Score: 4  SUPINE TO SIT  Lying to Sitting on Side of Bed  Assistance Needed: Supervision  CARE Score: 4  SIT TO STAND  Sit to Stand  Assistance Needed: Incidental touching  Physical Assistance Level: No physical assistance  CARE Score: 4  TRANSFERS  Chair/Bed-to-Chair Transfer  Assistance Needed: Incidental touching  Physical Assistance Level: No physical assistance  CARE Score: 4  Discharge Goal: Independent  CAR TRANSFERS  Car Transfer  Reason if not Attempted: Safety concerns  CARE Score: 88  WALK 10 FT  Walk 10 Feet  Assistance Needed: Physical assistance  Physical Assistance Level: 50%-74%  Comment: WITHOUT ASSISTIVE DEVICE  CARE Score: 2  WALK 50 FT  Walk 50 Feet with Two Turns  Assistance Needed: Physical assistance  Physical Assistance Level: 25%-49%  Comment: WITH RW  CARE Score: 3  WALK 150 FT  Walk 150 Feet  Reason if not Attempted: Safety concerns  CARE Score: 88  WALK 10 FT UNEVEN SURFACES  Walking 10 Feet on Uneven Surfaces  Reason if not Attempted: Safety concerns  CARE Score: 88  1 STEP  1 Step (Curb)  Reason if not Attempted: Safety concerns  CARE Score: 88  4 STEPS  4 Steps  Reason if not Attempted: Safety concerns  CARE Score: 88  12 STEPS  12 Steps  Reason if not Attempted: Safety concerns  CARE Score: 80  PICKING UP OBJECT  Picking Up Object  Reason if not Attempted: Safety concerns  CARE Score: 88  WHEELCHAIR 50 FT  Wheel 50 Feet with Two Turns  Comment: 25 FT  Reason if not Attempted: Safety concerns  CARE Score: 88  WHEELCHAIR 150 FT        LAST TREATMENT TIME  PT Individual Minutes  Time In: 1000  Time Out: 1100  Minutes: 60                                              RECORD REVIEW: Previous medical records, medications were reviewed at

## 2018-09-16 NOTE — CONSULTS
Units, Subcutaneous, TID AC  levETIRAcetam (KEPPRA) tablet 500 mg, 500 mg, Oral, BID  LORazepam (ATIVAN) tablet 0.5 mg, 0.5 mg, Oral, Q8H PRN  metFORMIN (GLUCOPHAGE) tablet 500 mg, 500 mg, Oral, BID WC  pantoprazole (PROTONIX) tablet 40 mg, 40 mg, Oral, Daily  pravastatin (PRAVACHOL) tablet 40 mg, 40 mg, Oral, Nightly  sertraline (ZOLOFT) tablet 50 mg, 50 mg, Oral, Daily  traZODone (DESYREL) tablet 50 mg, 50 mg, Oral, Nightly  acetaminophen (TYLENOL) tablet 650 mg, 650 mg, Oral, Q4H PRN  magnesium hydroxide (MILK OF MAGNESIA) 400 MG/5ML suspension 30 mL, 30 mL, Oral, Daily PRN  docusate sodium (COLACE) capsule 100 mg, 100 mg, Oral, BID PRN  bisacodyl (DULCOLAX) suppository 10 mg, 10 mg, Rectal, Daily PRN  insulin lispro (HUMALOG) injection vial 0-6 Units, 0-6 Units, Subcutaneous, TID WC  insulin lispro (HUMALOG) injection vial 0-3 Units, 0-3 Units, Subcutaneous, Nightly  glucose (GLUTOSE) 40 % oral gel 15 g, 15 g, Oral, PRN  dextrose 50 % solution 12.5 g, 12.5 g, Intravenous, PRN  glucagon (rDNA) injection 1 mg, 1 mg, Intramuscular, PRN  dextrose 5 % solution, 100 mL/hr, Intravenous, PRN  insulin glargine (LANTUS) injection vial 20 Units, 20 Units, Subcutaneous, Nightly    Allergies: No Known Allergies    Social History:    Social History     Social History    Marital status:      Spouse name: Jovon De Paz    Number of children: 0    Years of education: 21     Occupational History    retired educator      Social History Main Topics    Smoking status: Never Smoker    Smokeless tobacco: Never Used    Alcohol use No    Drug use: No    Sexual activity: Yes     Partners: Female      Comment: wife     Other Topics Concern    Not on file     Social History Narrative    No narrative on file       Family History:   Family History   Problem Relation Age of Onset    Diabetes Mother        REVIEW OF SYSTEMS:    Constitutional: no fever, no night sweats;  Positive for insomnia  HEENT: no blurring of vision, no double vision     Lungs: no cough, no shortness of breath, no wheeze  CVS: no palpitation, no chest pain, no shortness of breath  GI: no abdominal pain, no nausea , no vomiting, no constipation  LACIE: no dysuria, frequency and urgency, no hematuria  Musculoskeletal: no joint pain, swelling , stiffness  Endocrine: no polyuria, polydypsia, no cold or heat intolerence; positive for DM  Hematology: no anemia, no easy brusing or bleeding, no hx of clotting disorder  Dermatology: no skin rash, no eczema, no pruritis; positive for craniotomy  Psychiatry: no suicide ideation  Neurology: positive for resected brain lesion, persistent balance issues - he wants to lean to the left when trying to ambulate.     Vitals:    /61   Pulse 57   Temp 96.6 °F (35.9 °C)   Resp 16   Ht 6' (1.829 m)   Wt 172 lb (78 kg)   SpO2 94%   BMI 23.33 kg/m²     PHYSICAL EXAM:    CONSTITUTIONAL: Alert, appropriate, no acute distress  EYES: Non icteric, EOM intact, pupils equal round   ENT: Mucus membranes moist, no oral pharyngeal lesions   NECK: Supple, no masses   CHEST/LUNGS: CTA bilaterally, normal respiratory effort   CARDIOVASCULAR: RRR, no murmurs  ABDOMEN: soft non-tender, active bowel sounds, no HSM  EXTREMITIES: warm, moves all fours  SKIN: Right parietal craniotomy site clean  LYMPH: No cervical, clavicular, axillary, or inguinal lymphadenopathy  NEUROLOGIC: follows commands, non focal   PSYCH: mood and affect appropriate      Recent Labs      09/15/18   0716  08/31/18   1308   WBC  7.4  8.7   HGB  11.5*  14.2   HCT  36.8*  44.3   MCV  92.0  88.6   PLT  155  231       Lab Results   Component Value Date     09/15/2018    K 4.1 09/15/2018     09/15/2018    CO2 25 09/15/2018    BUN 25 (H) 09/15/2018    CREATININE 0.9 09/15/2018    GLUCOSE 262 (H) 09/15/2018    CALCIUM 8.6 (L) 09/15/2018    PROT 5.2 (L) 09/15/2018    LABALBU 3.1 (L) 09/15/2018    BILITOT 0.3 09/15/2018    ALKPHOS 72 09/15/2018    AST 14 09/15/2018    ALT 26 09/15/2018    LABGLOM >60 09/15/2018       No results found for: INR, PROTIME    30 Day lookback of cultures:    Blood Culture Recent: No results for input(s): BC in the last 720 hours. Gram Stain Recent: No results for input(s): LABGRAM in the last 720 hours. Resp Culture Recent: No results for input(s): CULTRESP in the last 720 hours. Body Fluid Recent : No results for input(s): BFCX in the last 720 hours. MRSA Recent : No results for input(s): [address] in the last 720 hours. Urine Culture Recent :   Recent Labs      09/14/18   1930   LABURIN  <10,000 CFU/ml*  Rare growth     Organism Recent :   Recent Labs      09/14/18   1930   ORG  Escherichia coli*        ASSESSMENT/PLAN:  1.  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 Jose De Jesus Michel 9/11/2018. Frozen section revealed neoplastic tissue present with FINAL PATHOLOGY pending. Discussed with Wale and his wife, we're awaiting final pathology discuss further recommendations. Sarah Giron    09/16/18  8:58 AM     I will follow-up on path results when available and proceed with recommendations accordingly. I personally saw and examined this patient, performing a face-to-face diagnostic evaluation with plan of care reviewed and developed with Graciela Kelsey [facility] and nursing staff. I have addended and/or modified the above history of present illness, physical examination, and assessment and plan to reflect my findings and impressions. Essential elements of the care plan were discussed with Graciela Cole PAC . Agree with findings and assessment/plan as documented above. Questions were encouraged, asked and answered to their understanding and satisfaction.       Electronically signed by Charli Loya MD on 9/16/18 at 10:44 AM

## 2018-09-16 NOTE — PLAN OF CARE
Problem: Falls - Risk of:  Goal: Will remain free from falls  Will remain free from falls   Outcome: Ongoing  Bed alarm set, call light within reach, side rails up times two  Goal: Absence of physical injury  Absence of physical injury   Outcome: Ongoing  Bed alarm set, call light within reach, side rails up times two    Problem: Serum Glucose Level - Abnormal:  Goal: Ability to maintain appropriate glucose levels has stabilized  Ability to maintain appropriate glucose levels has stabilized   Outcome: Ongoing      Problem: Mood - Altered:  Goal: Mood stable  Mood stable   Outcome: Ongoing  Patients mood stable this shift    Problem: Pain:  Goal: Pain level will decrease  Pain level will decrease   Outcome: Ongoing  Patient encouraged to ask for pain med as needed to control acute pain    Problem: Anxiety:  Goal: Level of anxiety will decrease  Level of anxiety will decrease   Outcome: Ongoing      Problem: Infection - Surgical Site:  Goal: Will show no infection signs and symptoms  Will show no infection signs and symptoms   No complications or infection noted on this shift. Vital signs stable.

## 2018-09-17 NOTE — PROGRESS NOTES
Minimal assistance  Functional Mobility Comments: decreased L attention requiring min Verbal cues to correct. Toilet Transfers  Toilet - Technique: Stand pivot  Equipment Used: Standard bedside commode  Toilet Transfer: Minimal assistance  Bed mobility  Rolling to Left: Supervision  Rolling to Right: Supervision  Supine to Sit: Supervision  Sit to Supine: Supervision  Scooting: Supervision  Transfers  Sit to stand: Stand by assistance  Stand to sit: Stand by assistance        Cognition  Cognition Comment: alert, follows simple commands, required Min Verbal Cues to initiate management of clothing during toileting. Type of ROM/Therapeutic Exercise  Type of ROM/Therapeutic Exercise: Georgina  Comment: Georgina: BUE 15# x 10 reps x 5 sets      Assessment   Assessment: Min A and Min Verbal Cues to initiate clothing management during toileting. Min Physical and Verbal Cues to correct posterior lean in both sitting and standing. Met STG of standing 3-4 minutes during static tasks. Min->Occassional Verbal cues to attend to left side.   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)  Prognosis: Good  Patient Education: fall  Activity Tolerance  Activity Tolerance: Patient Tolerated treatment well  Safety Devices  Type of devices: Left in chair;Chair alarm in place (Transported to physical therapy and staff notified.)          Plan   Plan  Times per week: 5  Times per day: Daily  Specific instructions for Next Treatment: BITs system  Current Treatment Recommendations: Self-Care / ADL, Safety Education & Training, Cognitive/Perceptual Training, Functional Mobility Training, Cognitive Reorientation, Balance Training, Home Management Training, Patient/Caregiver Education & Training  OutComes Score  SELF-CARE   Toileting 4  (Physical assist and verbal cues to initiate management of clothing.)     Goals  Short term goals  Time Frame for Short term goals: 1 week  Short term goal 1: complete UB

## 2018-09-17 NOTE — PROGRESS NOTES
disorderon Zoloft  7. Insomniaon trazodone  8. Pain controlBrooks when necessary  9. PT/Ot  10.  DVT prophylaxisSCDs    Continue current care    Expected duration and frequency therapy: 180 minutes per day, 5 days per week    [address] [facility]  [phone] CELL  Dr Sandy Jobs

## 2018-09-17 NOTE — PROGRESS NOTES
[facility]  INPATIENT SPEECH THERAPY  [facility] [address] [facility] UNIT  TIME    1300 1400        [x]Daily Note  []Progress Note    Date: 2018  Patient Name: Rosalie Noe        MRN: [id number]    Account #: [de-identified]  : 1940  (80 y.o.)  Gender: male   Primary Provider: Vernell Davenport MD  Precautions: Fall    Subjective: Patient alert and cooperative with all therapy tasks. Objective: Patient completed an immediate recall task with 4/5 units with 80% accuracy. Patient requiring mild/moderate verbal cues. Patient oriented to time, place and biographical information this date with no cues required. Patient completed visual scanning task utilizing the BITS system. Patient completed the bell cancellation task with no verbal cues with only one missed. It was noted patient demonstrating with decreased scanning and increased time to complete task. Patient did require extra time to look to the left as well. Patient also completed number tracking exercise. Patient completed with no verbal cues required, however increased time to complete task and scan to the left were noted. Patient completed clock drawing test to compare to evaluation. Patient was able to fill in all numbers and attend to the left side, however when asked to put the time on the clock he had difficulty. Patient was then asked to place time on 4 different clocks and only one clock the time was accurate. Patient made a list of related 5 related items and then was asked to recall them after 3 minute delay. Patient recalling 5/5 items. Patient completed a number task related to visual scanning. Patient had significant difficulty completing this task. SLP discussed errors and patient demonstrating some understanding of errors. Patient able to recalls safety precautions however has difficulty utilizing them. Patient educated again on the importance of not getting up by himself. Patient demonstrating understanding.         SHORT TERM GOAL care    []Modify current plan of care as follows:    []Discharge patient    Discharge Location:    Services/Supervision Recommended:      [x]Patient continues to require treatment by a licensed therapist to address functional deficits as outlined in the established plan of care.

## 2018-09-17 NOTE — PATIENT CARE CONFERENCE
term goals: 2 WEEKS  Short term goal 1: TF FIM 4  Short term goal 2: AMB FIM 4  Short term goal 3: WC FIM 5  Short term goal 4: STEP FIM 1  Short term goal 5: HEP SBA    Long term goals  Time Frame for Long term goals : 4 WEEKS  Long term goal 1: TF FIM 6  Long term goal 2: AMB FIM 6  Long term goal 3: WC FIM 6  Long term goal 4: STEP FIM 5E  Long term goal 5: HEP INDEP    ASSESSMENT:  Assessment: Pt needed VC's for proper 3 pt gait pattern. Pt showed inconsistent step length on LLE. Pt started leaning to L side towards the end of amb due to LUE weakness. Pt showing inattention on L side. Pt's L hand would start to slip back on RW at times. Pt continues to show decreased safety awarness and L side inattention. Pt tolerated sitting ther ex well. SPEECH THERAPY  Precautions: Fall     Subjective: Patient alert and cooperative with all therapy tasks.      Objective: Patient completed an immediate recall task with 4/5 units with 80% accuracy. Patient requiring mild/moderate verbal cues. Patient oriented to time, place and biographical information this date with no cues required. Patient completed visual scanning task utilizing the BITS system. Patient completed the bell cancellation task with no verbal cues with only one missed. It was noted patient demonstrating with decreased scanning and increased time to complete task. Patient did require extra time to look to the left as well. Patient also completed number tracking exercise. Patient completed with no verbal cues required, however increased time to complete task and scan to the left were noted.      Patient completed clock drawing test to compare to evaluation. Patient was able to fill in all numbers and attend to the left side, however when asked to put the time on the clock he had difficulty.    Patient was then asked to place time on 4 different clocks and only one clock the time was accurate.      Patient made a list of related 5 related items and then was asked to recall them after 3 minute delay. Patient recalling 5/5 items.      Patient completed a number task related to visual scanning. Patient had significant difficulty completing this task. SLP discussed errors and patient demonstrating some understanding of errors.      Patient able to recalls safety precautions however has difficulty utilizing them. Patient educated again on the importance of not getting up by himself.  Patient demonstrating understanding.         SHORT TERM GOAL #1:  Goal 1: Patient will complete visual scanning tasks to improve safety for functional ADL's by completing cancellation, tracking, reading, etc tasks with 100% accuracy.       SHORT TERM GOAL #2:  Goal 2: Patient will complete orientation tasks (time, place, biographical information) with 100% accuracy given minimal verbal cues and prompts.      SHORT TERM GOAL #3:  Goal 3: Patient will recall 5 facts at paragraph level reading to improve short term recall with minimal prompts/verbal cues.      SHORT TERM GOAL #4:  Goal 4: Patient will recall/utilize safety precautions to increase safety with functional ADL tasks to return to independent living.      SHORT TERM GOAL #5:  Goal 5: Patient will recall 5 recent/daily events with minimal verbal cues and prompts to improve orientation of environment and situation.         Comprehension: 4 - Patient understands basic needs 75-90%+ of the time  Expression: 5 - Expresses basic ideas/needs only (hungry/hot/pain)  Social Interaction: 4 - Patient appropriate 75-90%+ of the time  Problem Solving: 3 - Patient solves simple/routine tasks 50%-74%  Memory: 3 - Patient remembers 50%-74% of the time     ASSESSMENT:  Assessment: [x]Progressing towards goals               []Not Progressing towards goals       GOALS MET: 0/5 STG 0/1 LTG    OCCUPATIONAL THERAPY    OT FIM PROGRESS   ADMIT SCORE CURRENT SCORE GOAL   EATING Eatin - Feeds self with setup/supervision/cues and/or requires only setup/supervision/cues to perform tube feedings Eatin - Feeds self with setup/supervision/cues and/or requires only setup/supervision/cues to perform tube feedings GOAL: Eatin   GROOMING Groomin - Requires setup/cues to do all tasks Groomin - Requires setup/cues to do all tasks GOAL: Groomin   BATHING Bathin - Able to bathe 8-9 areas Bathin - Able to bathe 8-9 areas GOAL: Bathin   UPPER EXTREMITY DRESSING Dressing-Upper: 4 - Requires assist with buttons/zippers only and/or requires assist with one arm only Dressing-Upper: 4 - Requires assist with buttons/zippers only and/or requires assist with one arm only GOAL: Dressing-Upper: 7   LOWER EXTREMITY DRESSING Dressing-Lower: 3 - Requires assist with 2-3 parts of dressing Dressing-Lower: 3 - Requires assist with 2-3 parts of dressing GOAL: Dressing-Lower: 6   TOILETING Toiletin - Requires steadying assistance only Toiletin - Requires steadying assistance only (Physical assist and verbal cues to initiate management of clothing.) GOAL: Toiletin   TOILET TRANSFER Toilet Transfer: 4 - Requires steadying assistance only < 25% assist Toilet Transfer: 4 - Requires steadying assistance only < 25% assist GOAL: Toilet: 6   TUB/SHOWER TRANSFER Primary Mode: Shower     Shower Transfer: 0 - Activity does not occur   Primary Mode: Shower     Shower Transfer: 0 - Activity does not occur     Primary Mode: Shower  GOAL: Tub, Shower: 6         Cognition:  Comprehension: 4 - Patient understands basic needs 75-90%+ of the time   Expression: 5 - Expresses basic ideas/needs only (hungry/hot/pain)  Social Interaction: 5 - Patient is appropriate with supervision/cues  Problem Solvin - Patient solves simple/routine tasks 25%-49%  Memory: 3 - Patient remembers 50%-74% of the time    UE Functioning:  WNLs    STGs:  Short term goals  Time Frame for Short term goals: 1 week  Short term goal 1: complete UB dressing with supervision  Short term goal 2: management tasks  Frequency of Accidents: Bowel Frequency of Accidents: 6 - No accidents, uses device like bedpan, diaper, bedside commode colostomy, or requires medication to manage bowels    Wounds/Incisions/Ulcers: Incision healing well     Marco Scale Score: 19    Pain: No pain concerns to address    Consultations/Labs/X-rays: STERLING Heredia-   Frozen section revealed neoplastic tissue present with FINAL PATHOLOGY pending. Discussed with Wale and his wife, we're awaiting final pathology discuss further recommendations. Betito Courser    Family Education: Family available and participating in education     Fall Risk:  Esha Boone Score: 48    Fall in the last week? No      Other Nursing Issues: Alert and oriented but impulsive. Kenyatta Sys monitor in room. Gait unsteady and leans to left. Transfers with assist of one. No complaints of pain. Continent of bowel and bladder but has urgency. Last BM 9-16. Incision to right side of head open to air with sutures intact. Blood glucose elevated. Takes lantus and sliding scale insulin. Also on oral prednisone. SOCIAL WORK/CASE MANAGEMENT  Assessment:Wife at bedside and participates in care, anxious about his needs    Discharge Plan   Estimated Length of Stay: 9/26/18  Destination: undetermined at this time    Pass: No    Services at Discharge: rehabilitative services will be necessary; other services to be recommended by Oncology    Equipment at Discharge: to be determined    Progress made in the prior week:  1. No prior staffing  2.  3.  4.  5.      Goals for following week:  1. Min A for transfers  2. Improve safety with ADL's   3.  Complete overall dressing with supervision  4.   5.     Factors facilitating achievement of predicted outcomes: Family support    Barriers to the achievement of predicted outcomes: inattention to left side, decreased safety awareness    Team Members Present at Conference:  : [facility]   Occupational

## 2018-09-17 NOTE — PROGRESS NOTES
35.9*  36.8*  44.3   MCV  87.8  92.0  88.6   PLT  163  155  231       Lab Results   Component Value Date     09/17/2018    K 4.5 09/17/2018    CL 99 09/17/2018    CO2 27 09/17/2018    BUN 22 09/17/2018    CREATININE 0.9 09/17/2018    GLUCOSE 275 (H) 09/17/2018    CALCIUM 8.6 (L) 09/17/2018    PROT 5.5 (L) 09/17/2018    LABALBU 3.3 (L) 09/17/2018    BILITOT <0.2 09/17/2018    ALKPHOS 79 09/17/2018    AST 13 09/17/2018    ALT 26 09/17/2018    LABGLOM >60 09/17/2018       ASSESSMENT/PLAN:    1.  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 Pheobe Fleischer 9/11/2018.     Frozen section revealed neoplastic tissue present with FINAL PATHOLOGY pending.           [address] [facility]    09/17/18  6:40 AM

## 2018-09-17 NOTE — PROGRESS NOTES
Jovan Yamila  [id number]     09/17/18 1115 09/17/18 1116 09/17/18 1119   General   Response To Previous Treatment Patient with no complaints from previous session. --  --    Family / Caregiver Present Yes --  --    Subjective   Subjective Pt agreed to therapy this morning. --  --    Pain Screening   Patient Currently in Pain No --  --    Pre Treatment Pain Screening   Pain at present 0 --  --    Scale Used Numeric Score --  --    Intervention List Patient able to continue with treatment --  --    Transfers   Sit to Stand Contact guard assistance --  --    Stand to sit Contact guard assistance --  --    Ambulation   Ambulation? --  Yes --    Ambulation 1   Surface --  level tile --    Device --  Rolling Walker --    Other Apparatus --  Wheelchair follow --    Assistance --  Minimal assistance --    Quality of Gait --  Pt needed VC's for proper 3 pt gait pattern. Pt showed inconsistent step length on LLE. Pt started leaning to L side towards the end of amb due to LUE weakness. Pt showing inattention on L side. Pt's L hand would start to slip back on RW at times. --    Distance --  48' --    Wheelchair Activities   Left Leg Rest Level of Assistance --  Contact guard assistance --    Right Leg Rest Level of Assistance --  Contact guard assistance --    Left Brakes Level of Assistance --  Stand by assistance --    Right Brakes Level of Assistance --  Stand by Assist --    Exercises   Comments --  --  Sitting Néstor LE ther ex x 20 reps   Conditions Requiring Skilled Therapeutic Intervention   Body structures, Functions, Activity limitations --  --  Decreased functional mobility ; Decreased ADL status; Decreased strength;Decreased safe awareness;Decreased endurance;Decreased balance   Assessment --  --  Pt needed VC's for proper 3 pt gait pattern. Pt showed inconsistent step length on LLE. Pt started leaning to L side towards the end of amb due to LUE weakness. Pt showing inattention on L side.   Pt's L hand would

## 2018-09-18 NOTE — PROGRESS NOTES
stiffness  Respiratory: No shortness of breath  Cardiovascular: No chest pain No palpitations  Gastrointestinal: No abdominal pain    Genitourinary: No Dysuria  Neurological: No headache, no confusion    Past Medical History:      Diagnosis Date    Chronic allergic rhinitis 11/20/2017    Gastroesophageal reflux disease without esophagitis 11/20/2017    Hyperlipidemia     Hypertension     Lumbar disc disease 9/5/2018    Type 2 diabetes mellitus without complication [facility])        Past Surgical History:  No past surgical history on file.     Medications in Hospital:      Current Facility-Administered Medications:     dexamethasone (DECADRON) tablet 2 mg, 2 mg, Oral, Q12H, Dwayne Phelan MD, 2 mg at 09/18/18 0826    HYDROcodone-acetaminophen (NORCO) 5-325 MG per tablet 1 tablet, 1 tablet, Oral, Q8H PRN, Dwayne Phelan MD    insulin lispro (HUMALOG) injection vial 8 Units, 8 Units, Subcutaneous, TID AC, Dwayne Phelan MD, 8 Units at 09/18/18 0822    levETIRAcetam (KEPPRA) tablet 500 mg, 500 mg, Oral, BID, Dwayne Phelan MD, 500 mg at 09/18/18 0826    LORazepam (ATIVAN) tablet 0.5 mg, 0.5 mg, Oral, Q8H PRN, Dwayne Phelan MD, 0.5 mg at 09/16/18 2038    metFORMIN (GLUCOPHAGE) tablet 500 mg, 500 mg, Oral, BID WC, Dwayne Phelan MD, 500 mg at 09/18/18 0827    pantoprazole (PROTONIX) tablet 40 mg, 40 mg, Oral, Daily, Dwayne Phelan MD, 40 mg at 09/18/18 0827    pravastatin (PRAVACHOL) tablet 40 mg, 40 mg, Oral, Nightly, Dwayne Phelan MD, 40 mg at 09/17/18 2038    sertraline (ZOLOFT) tablet 50 mg, 50 mg, Oral, Daily, Dwayne Phelan MD, 50 mg at 09/18/18 0827    traZODone (DESYREL) tablet 50 mg, 50 mg, Oral, Nightly, Dwayne Phelan MD, 50 mg at 09/17/18 2038    acetaminophen (TYLENOL) tablet 650 mg, 650 mg, Oral, Q4H PRN, Dwayne Phelan MD    magnesium hydroxide (MILK OF MAGNESIA) 400 MG/5ML suspension 30 mL, 30 mL, Oral, Daily PRN, Dwayne Phelan MD, 30 mL at 09/16/18 1600    docusate sodium (COLACE) capsule 100 mg, 100 mg, Oral, BID PRN, Aimee Mix MD    bisacodyl (DULCOLAX) suppository 10 mg, 10 mg, Rectal, Daily PRN, Aimee Mix MD    insulin lispro (HUMALOG) injection vial 0-6 Units, 0-6 Units, Subcutaneous, TID WC, Aimee Mix MD, 2 Units at 09/18/18 0823    insulin lispro (HUMALOG) injection vial 0-3 Units, 0-3 Units, Subcutaneous, Nightly, Aimee Mix MD, 1 Units at 09/17/18 2133    glucose (GLUTOSE) 40 % oral gel 15 g, 15 g, Oral, PRN, Aimee Mix MD    dextrose 50 % solution 12.5 g, 12.5 g, Intravenous, PRN, Aimee Mix MD    glucagon (rDNA) injection 1 mg, 1 mg, Intramuscular, PRN, Aimee Mix MD    dextrose 5 % solution, 100 mL/hr, Intravenous, PRN, Aimee Mix MD    insulin glargine (LANTUS) injection vial 20 Units, 20 Units, Subcutaneous, Nightly, Aimee Mix MD, 20 Units at 09/17/18 2038    Allergies:  Patient has no known allergies. Social History:   TOBACCO:   reports that he has never smoked. He has never used smokeless tobacco.  ETOH:   reports that he does not drink alcohol. Family History:   Family History   Problem Relation Age of Onset    Diabetes Mother          PHYSICAL EXAM:  /68   Pulse 60   Temp 97.4 °F (36.3 °C) (Temporal)   Resp 16   Ht 6' (1.829 m)   Wt 172 lb (78 kg)   SpO2 94%   BMI 23.33 kg/m²     Constitutional  well developed, well nourished. Eyes  conjunctiva normal.   Ear, nose, throat - No scars, masses, or lesions over external nose or ears, no atrophy of tongue  Neck-symmetric, no masses noted, no jugular vein distension  Respiration- chest wall appears symmetric, good expansion,   normal effort without use of accessory muscles  Musculoskeletal  no significant wasting of muscles noted, no bony deformities  Extremities-no clubbing, cyanosis or edema  Skin  warm, dry, and intact. No rash, erythema, or pallor.   Psychiatric  mood, affect, and behavior appear normal.      Neurological exam  Awake, alert, fluent oriented basic ideas/needs only (hungry/hot/pain)  Social Interaction: 5 - Patient is appropriate with supervision/cues  Problem Solvin - Patient solves simple/routine tasks 25%-49%   Memory: 3 - Patient remembers 50%-74% of the time        REHAB POTENTIAL:            [] Excellent        [x] Good  [] Fair                 [] Poor     EDUCATION:              Learner:           [x]Patient [] Significant other         [] Son/Daughter [] Parent                                      [] Other:              Education:       [x] Reviewed results and recommendations of this evaluation                                      [] Reviewed diet and strategies                                      [] Reviewed signs, symptoms and risk of aspiration                                      [] Demonstrated how to thick liquid appropriately. [x] Reviewed goals and Plan of Care                                      [] OTHER:              Method:           [] Discussion      [] Demonstration           [] Hand-out                                      [] OTHER:              Evaluation of Education:                                      [] Verbalizes understanding      [] Demonstrates with assistance                                      [] Demonstrates without assistance      [x]Needs further instruction                                      [] No evidence of learning                     [] Family not present     PLAN / TREATMENT RECOMMENDATIONS:  [] No further speech therapy services indicated. [x]  Further Speech therapy services reccommended         See POC for Goals.                              RECORD REVIEW: Previous medical records, medications were reviewed at today's visit    IMPRESSION:   1. Status post resection of brain tumoron Decadron/Keppra  2. Diabeteson medicationsmonitor blood sugar  3. Seizure prophylaxison Keppra  4. GIbowel regimen/proton pump inhibitor  5. Hyperlipidemiastatin  6.  Mood

## 2018-09-18 NOTE — PROGRESS NOTES
tasks to return to independent living. SHORT TERM GOAL #5:  Goal 5: Patient will recall 5 recent/daily events with minimal verbal cues and prompts to improve orientation of environment and situation. Comprehension: 4 - Patient understands basic needs 75-90%+ of the time  Expression: 4 - Expresses basic ideas/needs 75-90%+ of the time  Social Interaction: 5 - Patient is appropriate with supervision/cues  Problem Solving: 3 - Patient solves simple/routine tasks 50%-74%  Memory: 3 - Patient remembers 50%-74% of the time    ASSESSMENT:  Assessment: [x]Progressing towards goals          []Not Progressing towards goals    Patient Tolerance of Treatment:   [x]Tolerated well []Tolerated fair []Required rest breaks []Fatigued    Education:  Learner:  [x]Patient          []Significant Other          []Other  Education provided regarding:  [x]Goals and POC   []Diet and swallowing precautions    []Home Exercise Program  []Progress and/or discharge information  Method of Education:  [x]Discussion          [x]Demonstration          []Handout          []Other  Evaluation of Education:   []Verbalized understanding   []Demonstrates without assistance  [x]Demonstrates with assistance  [x]Needs further instruction     []No evidence of learning                  []No family present      Plan: [x]Continue with current plan of care    []Modify current plan of care as follows:    []Discharge patient    Discharge Location:    Services/Supervision Recommended:      [x]Patient continues to require treatment by a licensed therapist to address functional deficits as outlined in the established plan of care.

## 2018-09-19 NOTE — PLAN OF CARE
Problem: Falls - Risk of:  Goal: Will remain free from falls  Will remain free from falls   Outcome: Ongoing      Problem: Serum Glucose Level - Abnormal:  Goal: Ability to maintain appropriate glucose levels has stabilized  Ability to maintain appropriate glucose levels has stabilized   Outcome: Ongoing      Problem: Mood - Altered:  Goal: Mood stable  Mood stable   Outcome: Ongoing      Problem: Pain:  Goal: Pain level will decrease  Pain level will decrease   Outcome: Ongoing      Problem: Anxiety:  Goal: Level of anxiety will decrease  Level of anxiety will decrease   Outcome: Ongoing      Problem: Infection - Surgical Site:  Goal: Will show no infection signs and symptoms  Will show no infection signs and symptoms   Outcome: Ongoing

## 2018-09-19 NOTE — PROGRESS NOTES
Enio West  [id number]     09/19/18 1141 09/19/18 1142 09/19/18 1143   General   Response To Previous Treatment Patient with no complaints from previous session. --  --    Family / Caregiver Present Yes --  --    Subjective   Subjective Pt agreed to therapy this morning. --  --    Pain Screening   Patient Currently in Pain No --  --    Pain Assessment   Pain Level 0 --  --    Pre Treatment Pain Screening   Pain at present 0 --  --    Scale Used Numeric Score --  --    Intervention List Patient able to continue with treatment --  --    Orientation   Overall Orientation Status WNL --  --    Transfers   Sit to Stand --  Contact guard assistance --    Stand to sit --  Contact guard assistance --    Bed to Chair --  Contact guard assistance --    Stand Pivot Transfers --  Contact guard assistance --    Ambulation   Ambulation? --  --  Yes   Ambulation 1   Surface --  --  level tile   Device --  --  Rolling Walker   Assistance --  --  Minimal assistance   Quality of Gait --  --  Pt improved and does better w/ continous gait pattern. Pt keeps leaning to L side during amb. Pt also needs VC's to stay inside RW. Distance --  --  150'   Wheelchair Activities   Left Leg Rest Level of Assistance --  --  Stand by assistance   Right Leg Rest Level of Assistance --  --  Stand by assistance   Left Brakes Level of Assistance --  --  Stand by assistance   Right Brakes Level of Assistance --  --  Stand by Assist   Propulsion --  --  Yes   Propulsion 1   Propulsion --  --  Manual   Level --  --  Level Tile   Method --  --  RUE;LUE   Level of Assistance --  --  Contact guard assistance   Description/ Details --  --  Pt needed LUE placed on rim to start propulsion. Pt had some difficulty keeping it straight.    Distance --  --  100'   Exercises   Comments --  --  --    Conditions Requiring Skilled Therapeutic Intervention   Body structures, Functions, Activity limitations --  --  --    Assessment --  --  --    Prognosis --  --  -- Activity Tolerance   Activity Tolerance --  --  --        09/19/18 1144   General   Response To Previous Treatment --    Family / Caregiver Present --    Subjective   Subjective --    Pain Screening   Patient Currently in Pain --    Pain Assessment   Pain Level --    Pre Treatment Pain Screening   Pain at present --    Scale Used --    Intervention List --    Orientation   Overall Orientation Status --    Transfers   Sit to Stand --    Stand to sit --    Bed to Chair --    Stand Pivot Transfers --    Ambulation   Ambulation? --    Ambulation 1   Surface --    Device --    Assistance --    Quality of Gait --    Distance --    Wheelchair Activities   Left Leg Rest Level of Assistance --    Right Leg Rest Level of Assistance --    Left Brakes Level of Assistance --    Right Brakes Level of Assistance --    Propulsion --    Propulsion 1   Propulsion --    Level --    Method --    Level of Assistance --    Description/ Details --    Distance --    Exercises   Comments Standing Néstor LE ther ex x 20 reps in parallel bars. Conditions Requiring Skilled Therapeutic Intervention   Body structures, Functions, Activity limitations Decreased functional mobility ; Decreased ADL status; Decreased strength;Decreased endurance   Assessment Pt continues to have trouble processing commands during TF's and standing ther ex needing constant tactile and verbal cues. Pt tolerated increase in amb distance well w/ minimal fatigue. Pt tolerated standing ther ex well, but have difficulty following commands.    Prognosis Good   Activity Tolerance   Activity Tolerance Patient Tolerated treatment well   Electronically signed by Lennox Shield, PTA on 9/19/2018 at 11:58 AM

## 2018-09-19 NOTE — PROGRESS NOTES
[facility]  INPATIENT SPEECH THERAPY  [facility] 8 REHAB UNIT  TIME   Time In: 1300  Time Out: 1400  Minutes: 60       [x]Daily Note  []Progress Note    Date: 2018  Patient Name: Amy Edwards        MRN: [id number]    Account #: [de-identified]  : 1940  (80 y.o.)  Gender: male   Primary Provider: Kingsley Lewis MD  Precautions: Xiao Zelaya  Swallowing Status/Diet: Carb Control diet, thin liquids      Subjective: The patient was upright in his bed. Patient still has Avasys in his room due to impulsivity. Objective: The patient answered orientation questions (able to state correct year and month day of the week, location). Pt did not know the date. Answered biographical questions at 100%. The patient completed cancellation tasks (single letter) with moderate visual cues. The patient followed written directions with max cues. The patient completed picture recall tasks at 40%. Paragraph recall tasks at 66% up to 100% with moderate cueing. Patient was able to verbalize safety precautions. He was able to state why the Avasys is in his room. He did not remember when he tried to get out of bed by himself. Patient did manage his finances/bills prior to this diagnosis. He also managed 2 medications (in the original bottle and with a pill organizer) per his report. Patient was able to solve simple/basic mathematics presented verbally without assistance. Clock drawing tasks were completed and patient required mod to max cueing to write numbers on the left side of the clock face    Math word problems presented (numbers with decimals) which proved too difficult. Simple/written math problems presented (single digit) and he completed this easily. Written math problems with double digits completed with max cues.      SHORT TERM GOAL #1:  Goal 1: Patient will complete visual scanning tasks to improve safety for functional ADL's by completing cancellation, tracking, reading, etc tasks with 100% Recommended:      [x]Patient continues to require treatment by a licensed therapist to address functional deficits as outlined in the established plan of care.                       Electronically Signed By:  Bhaskar Welch M.S., CCC-SLP  9/19/2018,1:08 PM.

## 2018-09-19 NOTE — PLAN OF CARE
Problem: Falls - Risk of:  Goal: Will remain free from falls  Will remain free from falls   Outcome: Ongoing    Goal: Absence of physical injury  Absence of physical injury   Outcome: Ongoing      Problem: Serum Glucose Level - Abnormal:  Goal: Ability to maintain appropriate glucose levels has stabilized  Ability to maintain appropriate glucose levels has stabilized   Outcome: Ongoing      Problem: Mood - Altered:  Goal: Mood stable  Mood stable   Outcome: Ongoing      Problem: Pain:  Goal: Pain level will decrease  Pain level will decrease   Outcome: Ongoing      Problem: Anxiety:  Goal: Level of anxiety will decrease  Level of anxiety will decrease   Outcome: Ongoing

## 2018-09-19 NOTE — PROGRESS NOTES
docusate sodium (COLACE) capsule 100 mg, 100 mg, Oral, BID PRN, David Sanders MD    bisacodyl (DULCOLAX) suppository 10 mg, 10 mg, Rectal, Daily PRN, David Sanders MD    insulin lispro (HUMALOG) injection vial 0-6 Units, 0-6 Units, Subcutaneous, TID WC, David Sanders MD, 1 Units at 09/18/18 1832    insulin lispro (HUMALOG) injection vial 0-3 Units, 0-3 Units, Subcutaneous, Nightly, David Sandesr MD, 2 Units at 09/18/18 2123    glucose (GLUTOSE) 40 % oral gel 15 g, 15 g, Oral, PRN, David Sanders MD    dextrose 50 % solution 12.5 g, 12.5 g, Intravenous, PRN, David Sanders MD    glucagon (rDNA) injection 1 mg, 1 mg, Intramuscular, PRN, David Sanders MD    dextrose 5 % solution, 100 mL/hr, Intravenous, PRN, David Sanders MD    insulin glargine (LANTUS) injection vial 20 Units, 20 Units, Subcutaneous, Nightly, David Sanders MD, 20 Units at 09/18/18 2119    Allergies:  Patient has no known allergies. Social History:   TOBACCO:   reports that he has never smoked. He has never used smokeless tobacco.  ETOH:   reports that he does not drink alcohol. Family History:   Family History   Problem Relation Age of Onset    Diabetes Mother          PHYSICAL EXAM:  /66   Pulse 63   Temp 97.3 °F (36.3 °C) (Temporal)   Resp 16   Ht 6' (1.829 m)   Wt 172 lb (78 kg)   SpO2 96%   BMI 23.33 kg/m²     Constitutional  well developed, well nourished. Eyes  conjunctiva normal.   Ear, nose, throat - No scars, masses, or lesions over external nose or ears, no atrophy of tongue  Neck-symmetric, no masses noted, no jugular vein distension  Respiration- chest wall appears symmetric, good expansion,   normal effort without use of accessory muscles  Musculoskeletal  no significant wasting of muscles noted, no bony deformities  Extremities-no clubbing, cyanosis or edema  Skin  warm, dry, and intact. No rash, erythema, or pallor.   Psychiatric  mood, affect, and behavior appear normal.      Neurological

## 2018-09-20 NOTE — PROGRESS NOTES
Patient and son instructed on Insulin dosage and injection. Patient able to give own insulin Injections. Voices understanding.

## 2018-09-20 NOTE — PROGRESS NOTES
Patient:   Spenser Kim  MR#:    [id number]   Room:    0827/827-01   YOB: 1940  Date of Progress Note: 9/20/2018  Time of Note                           7:52 AM  Consulting Physician:   Ho Beckham M.D. Attending Physician:  Ho Beckham MD     Chief complaint Status post surgery for brain tumor     S:This 69 y.o. male  pt admitted to [facility] on 9/6/18 from Dr. Viky Duran office. Pt had been referred to Dr. Skyler Velasco by his PCP, Dr. Brenda Garcia. Pt had presented at Dr. Rayna Finney office w/1 month hx of lowback pain, dizziness and per pt's wife, processing and cognitive issues. He also complained that his L leg wasn't functioning properly causing several LOB's, but was able to catch himself before falling. 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 He was admitted to the hospital for further evaluation. Upon admission he did have some agitation and confusion. CT's of abd/pelvis and chest were done  and were neg. He was started on Keppra & IV Decadron. He is a diabetic and the steroids have been causing his glucose to run from the 600's to 300's. His A1C was 10.9 on 9/8/18. He was only on metforman at home but was started on Levemir 20 units nightly an a mod to high dose sliding scale insulin. On 9/10/18, he underwent a R parietal craniotomy w/ image guided stereotactic navigation. He tolerated the procedure well and was moved out of ICU on 9/12/18. His steroids are being weaned. He is alert and oriented, but does have decreased safety awareness and decreased awareness on the L. Pathology reports are not back at this time. He is now medically stable and is participating w/therapy. He is ready to begin rehab w/plan of returning home after rehab dc.  No acute issues    REVIEW OF SYSTEMS:  Constitutional: No fevers No chills  Neck:No docusate sodium (COLACE) capsule 100 mg, 100 mg, Oral, BID PRN, Phoebe Soto MD    bisacodyl (DULCOLAX) suppository 10 mg, 10 mg, Rectal, Daily PRN, Phoebe Soto MD    insulin lispro (HUMALOG) injection vial 0-6 Units, 0-6 Units, Subcutaneous, TID WC, Phoebe Soto MD, 3 Units at 09/19/18 1256    insulin lispro (HUMALOG) injection vial 0-3 Units, 0-3 Units, Subcutaneous, Nightly, Phoebe oSto MD, 2 Units at 09/18/18 2123    glucose (GLUTOSE) 40 % oral gel 15 g, 15 g, Oral, PRN, Phoebe Soto MD    dextrose 50 % solution 12.5 g, 12.5 g, Intravenous, PRN, Phoebe Soto MD    glucagon (rDNA) injection 1 mg, 1 mg, Intramuscular, PRN, Phoebe Soto MD    dextrose 5 % solution, 100 mL/hr, Intravenous, PRN, Phoebe Soto MD    insulin glargine (LANTUS) injection vial 20 Units, 20 Units, Subcutaneous, Nightly, Phoebe Soto MD, 20 Units at 09/19/18 2154    Allergies:  Patient has no known allergies. Social History:   TOBACCO:   reports that he has never smoked. He has never used smokeless tobacco.  ETOH:   reports that he does not drink alcohol. Family History:   Family History   Problem Relation Age of Onset    Diabetes Mother          PHYSICAL EXAM:  /62   Pulse 60   Temp 97.8 °F (36.6 °C) (Temporal)   Resp 16   Ht 6' (1.829 m)   Wt 172 lb (78 kg)   SpO2 96%   BMI 23.33 kg/m²     Constitutional  well developed, well nourished. Eyes  conjunctiva normal.   Ear, nose, throat - No scars, masses, or lesions over external nose or ears, no atrophy of tongue  Neck-symmetric, no masses noted, no jugular vein distension  Respiration- chest wall appears symmetric, good expansion,   normal effort without use of accessory muscles  Musculoskeletal  no significant wasting of muscles noted, no bony deformities  Extremities-no clubbing, cyanosis or edema  Skin  warm, dry, and intact. No rash, erythema, or pallor.   Psychiatric  mood, affect, and behavior appear normal.      Neurological exam  Awake, alert, fluent oriented appropriate affect  Attention and concentration appear appropriate  Recent and remote memory appears unremarkable  Speech normal without dysarthria  No clear issues with language of fund of knowledge     Cranial Nerve Exam     CN III, IV,VI-EOMI, No nystagmus, conjugate eye movements, no ptosis    CN VII-no facial assymetry       Motor Exam  antigravity throughout upper and lower extremities bilaterally      Tremors- no tremors in hands or head noted     Gait  Not tested    Nursing/pcp notes, imaging,labs and vitals reviewed. PT,OT and/or speech notes reviewed    Lab Results   Component Value Date    WBC 9.3 09/20/2018    HGB 11.6 (L) 09/20/2018    HCT 35.1 (L) 09/20/2018    MCV 87.5 09/20/2018     09/20/2018     Lab Results   Component Value Date     09/20/2018    K 4.3 09/20/2018     09/20/2018    CO2 27 09/20/2018    BUN 22 09/20/2018    CREATININE 1.0 09/20/2018    GLUCOSE 222 (H) 09/20/2018    CALCIUM 8.7 (L) 09/20/2018    PROT 5.0 (L) 09/20/2018    LABALBU 3.3 (L) 09/20/2018    BILITOT <0.2 09/20/2018    ALKPHOS 90 09/20/2018    AST 13 09/20/2018    ALT 26 09/20/2018    LABGLOM >60 09/20/2018   No results found for: INR, PROTIME          Khushi Marbury  [id number]       09/18/18 1231 09/18/18 1232 09/18/18 1233   General   Response To Previous Treatment Patient with no complaints from previous session. --  --    Family / Caregiver Present Yes --  --    Subjective   Subjective Pt agreed to therapy this morning.    --  --    Pain Screening   Patient Currently in Pain No --  --    Pre Treatment Pain Screening   Pain at present 0 --  --    Scale Used Numeric Score --  --    Intervention List Patient able to continue with treatment --  --    Orientation   Overall Orientation Status WNL --  --    Bed Mobility   Rolling Independent --  --    Supine to Sit Independent --  --    Scooting Independent --  --    Transfers   Sit to Stand Contact guard assistance --  --    Stand to sit Contact guard assistance --  --    Bed to Chair Contact guard assistance --  --    Stand Pivot Transfers Contact guard assistance --  --    Ambulation   Ambulation? --  Yes --    Ambulation 1   Surface --  level tile --    Device --  Rolling Walker --    Assistance --  Minimal assistance --    Quality of Gait --  Pt improved and does better w/ continous gait pattern. Pt keeps leaning to L side during amb. Pt also needs VC's to stay inside RW. --    Distance --  100'x2 --    Exercises   Comments --  --  Sitting Néstor LE ther ex x 20 reps   Conditions Requiring Skilled Therapeutic Intervention   Body structures, Functions, Activity limitations --  --  Decreased functional mobility ; Decreased ADL status; Decreased strength;Decreased endurance   Assessment --  --  Pt improved and does better w/ continous gait pattern. Pt does keep leaning to L side and needs VC's to stay inside RW. Pt tolerated sitting ther ex well and may benefit from standing ther ex next session. Prognosis --  --  Good   Activity Tolerance   Activity Tolerance --  --  Patient Tolerated treatment well   Electronically signed by Jenifer Whiting PTA on 9/18/2018 at 12:35 PM         Cosigned by: Dea Robertson PT at 9/18/2018  4:34 PM   Revision History                              RECORD REVIEW: Previous medical records, medications were reviewed at today's visit    IMPRESSION:   1. Status post resection of brain tumoron Decadron/Keppra  2. Diabeteson medicationsmonitor blood sugar  3. Seizure prophylaxison Keppra  4. GIbowel regimen/proton pump inhibitor  5. Hyperlipidemiastatin  6. Mood disorderon Zoloft  7. Insomniaon trazodone  8. Pain controlS[place]ac when necessary  9. PT/Ot  10.  DVT prophylaxisSCDs    Continue present care    ELOS sept 26th    Expected duration and frequency therapy: 180 minutes per day, 5 days per week    [address] [facility]  [phone] CELL  Dr Dona العراقي

## 2018-09-20 NOTE — PLAN OF CARE
Problem: Falls - Risk of:  Goal: Will remain free from falls  Will remain free from falls   Outcome: Ongoing      Problem: Serum Glucose Level - Abnormal:  Goal: Ability to maintain appropriate glucose levels has stabilized  Ability to maintain appropriate glucose levels has stabilized   Outcome: Ongoing      Problem: Mood - Altered:  Goal: Mood stable  Mood stable   Outcome: Ongoing      Problem: Pain:  Goal: Pain level will decrease  Pain level will decrease   Outcome: Ongoing      Problem: Anxiety:  Goal: Level of anxiety will decrease  Level of anxiety will decrease   Outcome: Ongoing      Problem: Risk for Impaired Skin Integrity  Goal: Tissue integrity - skin and mucous membranes  Structural intactness and normal physiological function of skin and  mucous membranes.    Outcome: Ongoing

## 2018-09-20 NOTE — PROGRESS NOTES
care    []Modify current plan of care as follows:    []Discharge patient    Discharge Location:    Services/Supervision Recommended:      [x]Patient continues to require treatment by a licensed therapist to address functional deficits as outlined in the established plan of care.

## 2018-09-20 NOTE — CARE COORDINATION
And Mrs. Shaila Dodson were given news of diagnosis this morning, in follow up, asked how we could assist them. They are adamant about going home as soon as possible- setting up family education at this afternoons scheduled times. Mrs. Shaila Dodson was reluctant but explained it is for safety for her and her  to go over techniques to transfers, ambulation, steps, car transfers. She indicates she will hire assistance through local care agency (i.e. [facility])-I explained their role. She agreed and plan discharge for 9/21/18. Notified Oncology office for any further instructions.

## 2018-09-20 NOTE — PROGRESS NOTES
PROGRESS NOTE  Patient name: Lenin Soliman  Patient : 1940      SUBJECTIVE: left sided weakness persists. Doing well with PT, though Magrot Steiner gets \"frustrated\"    INTERVAL HISTORY  Todd Mariano presented to [facility] 2018 with a 1 month history of dizziness, low back pain, confusion.      MRI brain with and without gadolinium on 2018 revealed a 5.2 x 3.3 x 4.6 cm right supratentorial mass.     CT chest with contrast 2018 at [facility] was negative for any obvious primary malignancy but did reveal a 5 mm NJ nodule.     CT abdomen and pelvis with contrast 2018 at [facility] was negative for any obvious primary or metastatic malignancy.     Dr. Arlene Purcell performed a right parietal craniotomy with tumor debulking on 2018. Frozen section revealed neoplastic tissue present. FINAL PATHOLOGY consistent with GBM. Post-operative MRI of the brain on 18 at [facility] revealed residual enhancing tumor of the right posterior parietal lobe creating mass effect on the posterior horn lateral ventricle. Pneumocephalus and small right subdural collection related to recent surgery noted. Margot Steiner was transferred to [facility] on 18 with medical oncology consultation requested. SUBJECTIVE:    REVIEW OF SYSTEMS:    Constitutional: no fever, no night sweats;  Positive for insomnia  HEENT: no blurring of vision, no double vision                                   Lungs: no cough, no shortness of breath, no wheeze  CVS: no palpitation, no chest pain, no shortness of breath  GI: no abdominal pain, no nausea , no vomiting, no constipation  LACIE: no dysuria, frequency and urgency, no hematuria  Musculoskeletal: no joint pain, swelling , stiffness  Endocrine: no polyuria, polydypsia, no cold or heat intolerence; positive for DM  Hematology: no anemia, no easy brusing or bleeding, no hx of clotting disorder  Dermatology: no skin rash, no eczema, no pruritis; positive for craniotomy  Psychiatry: no suicide ideation  Neurology: positive for resected brain lesion, left sided weakness    OBJECTIVE:  Vitals:    09/20/18 0301   BP: 117/62   Pulse: 60   Resp: 16   Temp: 97.8 °F (36.6 °C)   SpO2: 96%       Intake/Output Summary (Last 24 hours) at 09/20/18 0710  Last data filed at 09/20/18 0301   Gross per 24 hour   Intake             1260 ml   Output                0 ml   Net             1260 ml       PHYSICAL EXAM:    CONSTITUTIONAL: Alert, appropriate, no acute distress. Spouse at bedside  EYES: Non icteric, pupils equal round   ENT: Mucus membranes moist, no oral pharyngeal lesions   NECK: Supple, no masses   CHEST/LUNGS: CTA bilaterally, normal respiratory effort   CARDIOVASCULAR: RRR, no murmurs  ABDOMEN: soft non-tender, active bowel sounds  EXTREMITIES: left upper and lower extremity weakness  SKIN: Right parietal craniotomy site clean  LYMPH: No cervical, clavicular, axillary, or inguinal lymphadenopathy  NEUROLOGIC: follows commands, left sided weakness  PSYCH: mood and affect appropriate    Recent Labs      09/20/18   0535  09/17/18   0439  09/15/18   0716   WBC  9.3  12.0*  7.4   HGB  11.6*  11.8*  11.5*   HCT  35.1*  35.9*  36.8*   MCV  87.5  87.8  92.0   PLT  148  163  155       Lab Results   Component Value Date     09/20/2018    K 4.3 09/20/2018     09/20/2018    CO2 27 09/20/2018    BUN 22 09/20/2018    CREATININE 1.0 09/20/2018    GLUCOSE 222 (H) 09/20/2018    CALCIUM 8.7 (L) 09/20/2018    PROT 5.0 (L) 09/20/2018    LABALBU 3.3 (L) 09/20/2018    BILITOT <0.2 09/20/2018    ALKPHOS 90 09/20/2018    AST 13 09/20/2018    ALT 26 09/20/2018    LABGLOM >60 09/20/2018       ASSESSMENT/PLAN:    1. Brain mass  - POD#9 right parietal craniotomy on 9/11/18 by Dr. Tonia Naranjo for debulking of 5.2 x 3.3 x 4.6 right supratentorial intracranial mass.     Frozen section revealed neoplastic tissue present. FINAL PATHOLOGY consistent with GBM.   Lengthy discussion with patient/spouse per Dr. Karissa Murphy re: diagnosis, Treatment recommendations chemo+XRT vs XRT alone pending performance status post rehab. Discussed 2nd opinion at larger tertiary care center if desired. Wale and his wife will discuss further.     2.  Left sided weakness  - continuing PT        Alice Brenner    09/20/18  7:10 AM

## 2018-09-20 NOTE — PROGRESS NOTES
Lemon Scheuermann  [id number]     09/20/18 1149 09/20/18 1150 09/20/18 1151   General   Response To Previous Treatment Patient with no complaints from previous session. --  --    Family / Caregiver Present Yes --  --    Subjective   Subjective Pt agreed to therapy this morning. --  --    Pain Screening   Patient Currently in Pain No --  --    Pain Assessment   Pain Level 0 --  --    Pre Treatment Pain Screening   Pain at present 0 --  --    Scale Used Numeric Score --  --    Intervention List Patient able to continue with treatment --  --    Bed Mobility   Rolling Independent --  --    Supine to Sit Independent --  --    Sit to Supine Independent --  --    Scooting Independent --  --    Transfers   Sit to Stand --  Contact guard assistance --    Stand to sit --  Contact guard assistance --    Bed to Chair --  Contact guard assistance --    Stand Pivot Transfers --  Contact guard assistance --    Car Transfer --  Contact guard assistance --    Ambulation   Ambulation? --  Yes --    Ambulation 1   Surface --  level tile --    Device --  Rolling Walker --    Assistance --  Minimal assistance --    Quality of Gait --  Pt continues to lean to L side, showed forward flex posture, and needed VC's to stay inside RW. --    Distance --  200' --    Stairs/Curb   Stairs? --  Yes --    Stairs   # Steps  --  6 --    Rails --  Bilateral --    Device --  No Device --    Assistance --  Contact guard assistance --    Comment --  Pt amb well up and down stairs. --    Wheelchair Activities   Left Leg Rest Level of Assistance --  Stand by assistance --    Right Leg Rest Level of Assistance --  Stand by assistance --    Left Brakes Level of Assistance --  Stand by assistance --    Right Brakes Level of Assistance --  Stand by Assist --    Exercises   Comments --  --  Performed FTD.    Conditions Requiring Skilled Therapeutic Intervention   Body structures, Functions, Activity limitations --  --  --    Assessment --  --  --    Prognosis --

## 2018-09-21 NOTE — PROGRESS NOTES
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)   Functional Mobility   Functional - Mobility Device Rolling Walker   Activity To/from bathroom   Assist Level Contact guard assistance   Toilet Transfers   Toilet - Technique Ambulating   Toilet Transfer Contact guard assistance   Wheelchair Bed Transfers   Wheelchair/Bed - Technique Ambulating   Level of Asssistance Contact guard assistance   Type of ROM/Therapeutic Exercise   Type of ROM/Therapeutic Exercise Free weights   Comment 2#   Assessment   Performance deficits / Impairments Decreased functional mobility ; Decreased ADL status; Decreased cognition;Decreased safe awareness;Decreased high-level IADLs;Decreased vision/visual deficit; Decreased balance   Timed Code Treatment Minutes 45 Minutes   Activity Tolerance   Activity Tolerance Patient limited by fatigue   Safety Devices   Safety Devices in place Yes   Type of devices Call light within reach; Bed alarm in place

## 2018-09-21 NOTE — PROGRESS NOTES
stiffness  Respiratory: No shortness of breath  Cardiovascular: No chest pain No palpitations  Gastrointestinal: No abdominal pain    Genitourinary: No Dysuria  Neurological: No headache, no confusion    Past Medical History:      Diagnosis Date    Chronic allergic rhinitis 11/20/2017    Gastroesophageal reflux disease without esophagitis 11/20/2017    Hyperlipidemia     Hypertension     Lumbar disc disease 9/5/2018    Type 2 diabetes mellitus without complication [facility])        Past Surgical History:  No past surgical history on file.     Medications in Hospital:      Current Facility-Administered Medications:     miconazole (MICOTIN) 2 % powder, , Topical, BID, Moe Dimas MD    dexamethasone (DECADRON) tablet 2 mg, 2 mg, Oral, Q12H, Moe Dimas MD, 2 mg at 09/20/18 2119    HYDROcodone-acetaminophen (NORCO) 5-325 MG per tablet 1 tablet, 1 tablet, Oral, Q8H PRN, Moe Dimas MD, 1 tablet at 09/18/18 2126    insulin lispro (HUMALOG) injection vial 8 Units, 8 Units, Subcutaneous, TID AC, Moe Dimas MD, 8 Units at 09/20/18 1742    levETIRAcetam (KEPPRA) tablet 500 mg, 500 mg, Oral, BID, Moe Dimas MD, 500 mg at 09/20/18 2119    LORazepam (ATIVAN) tablet 0.5 mg, 0.5 mg, Oral, Q8H PRN, Moe Dimas MD, 0.5 mg at 09/16/18 2038    metFORMIN (GLUCOPHAGE) tablet 500 mg, 500 mg, Oral, BID WC, Moe Dimas MD, 500 mg at 09/20/18 1735    pantoprazole (PROTONIX) tablet 40 mg, 40 mg, Oral, Daily, Moe Dimas MD, 40 mg at 09/20/18 0833    pravastatin (PRAVACHOL) tablet 40 mg, 40 mg, Oral, Nightly, Moe Dimas MD, 40 mg at 09/20/18 2119    sertraline (ZOLOFT) tablet 50 mg, 50 mg, Oral, Daily, Moe Dimas MD, 50 mg at 09/20/18 0833    traZODone (DESYREL) tablet 50 mg, 50 mg, Oral, Nightly, Moe Dimas MD, 50 mg at 09/20/18 2119    acetaminophen (TYLENOL) tablet 650 mg, 650 mg, Oral, Q4H PRN, Moe Dimas MD    magnesium hydroxide (MILK OF MAGNESIA) 400 MG/5ML suspension 30 mL, 30 mL, Oral, Daily PRN, Saeed Croft MD, 30 mL at 09/16/18 1600    docusate sodium (COLACE) capsule 100 mg, 100 mg, Oral, BID PRN, Saeed Croft MD    bisacodyl (DULCOLAX) suppository 10 mg, 10 mg, Rectal, Daily PRN, Saeed Croft MD    insulin lispro (HUMALOG) injection vial 0-6 Units, 0-6 Units, Subcutaneous, TID WC, Saeed Croft MD, 1 Units at 09/20/18 1735    insulin lispro (HUMALOG) injection vial 0-3 Units, 0-3 Units, Subcutaneous, Nightly, Saeed Croft MD, 1 Units at 09/20/18 2130    glucose (GLUTOSE) 40 % oral gel 15 g, 15 g, Oral, PRN, Saeed Croft MD    dextrose 50 % solution 12.5 g, 12.5 g, Intravenous, PRN, Saeed Croft MD    glucagon (rDNA) injection 1 mg, 1 mg, Intramuscular, PRN, Saeed Croft MD    dextrose 5 % solution, 100 mL/hr, Intravenous, PRN, Saeed Croft MD    insulin glargine (LANTUS) injection vial 20 Units, 20 Units, Subcutaneous, Nightly, Saeed Croft MD, 20 Units at 09/20/18 2119    Allergies:  Patient has no known allergies. Social History:   TOBACCO:   reports that he has never smoked. He has never used smokeless tobacco.  ETOH:   reports that he does not drink alcohol. Family History:   Family History   Problem Relation Age of Onset    Diabetes Mother          PHYSICAL EXAM:  BP (!) 115/52   Pulse 56   Temp 97.9 °F (36.6 °C) (Temporal)   Resp 16   Ht 6' (1.829 m)   Wt 172 lb (78 kg)   SpO2 97%   BMI 23.33 kg/m²     Constitutional  well developed, well nourished. Eyes  conjunctiva normal.   Ear, nose, throat - No scars, masses, or lesions over external nose or ears, no atrophy of tongue  Neck-symmetric, no masses noted, no jugular vein distension  Respiration- chest wall appears symmetric, good expansion,   normal effort without use of accessory muscles  Musculoskeletal  no significant wasting of muscles noted, no bony deformities  Extremities-no clubbing, cyanosis or edema  Skin  warm, dry, and intact.  No rash, erythema, or pallor. Psychiatric  mood, affect, and behavior appear normal.      Neurological exam  Awake, alert, fluent oriented appropriate affect  Attention and concentration appear appropriate  Recent and remote memory appears unremarkable  Speech normal without dysarthria  No clear issues with language of fund of knowledge     Cranial Nerve Exam     CN III, IV,VI-EOMI, No nystagmus, conjugate eye movements, no ptosis    CN VII-no facial assymetry       Motor Exam  antigravity throughout upper and lower extremities bilaterally      Tremors- no tremors in hands or head noted     Gait  Not tested    Nursing/pcp notes, imaging,labs and vitals reviewed. PT,OT and/or speech notes reviewed    Lab Results   Component Value Date    WBC 9.3 09/20/2018    HGB 11.6 (L) 09/20/2018    HCT 35.1 (L) 09/20/2018    MCV 87.5 09/20/2018     09/20/2018     Lab Results   Component Value Date     09/20/2018    K 4.3 09/20/2018     09/20/2018    CO2 27 09/20/2018    BUN 22 09/20/2018    CREATININE 1.0 09/20/2018    GLUCOSE 222 (H) 09/20/2018    CALCIUM 8.7 (L) 09/20/2018    PROT 5.0 (L) 09/20/2018    LABALBU 3.3 (L) 09/20/2018    BILITOT <0.2 09/20/2018    ALKPHOS 90 09/20/2018    AST 13 09/20/2018    ALT 26 09/20/2018    LABGLOM >60 09/20/2018   No results found for: INR, PROTIME       [facility]  [id number]       09/20/18 1149 09/20/18 1150 09/20/18 1151   General   Response To Previous Treatment Patient with no complaints from previous session. --  --    Family / Caregiver Present Yes --  --    Subjective   Subjective Pt agreed to therapy this morning.    --  --    Pain Screening   Patient Currently in Pain No --  --    Pain Assessment   Pain Level 0 --  --    Pre Treatment Pain Screening   Pain at present 0 --  --    Scale Used Numeric Score --  --    Intervention List Patient able to continue with treatment --  --    Bed Mobility   Rolling Independent --  --    Supine to Sit Independent --  --    Sit to Supine Independent --  --    Scooting Independent --  --    Transfers   Sit to Stand --  Contact guard assistance --    Stand to sit --  Contact guard assistance --    Bed to Chair --  Contact guard assistance --    Stand Pivot Transfers --  Contact guard assistance --    Car Transfer --  Contact guard assistance --    Ambulation   Ambulation? --  Yes --    Ambulation 1   Surface --  level tile --    Device --  Rolling Walker --    Assistance --  Minimal assistance --    Quality of Gait --  Pt continues to lean to L side, showed forward flex posture, and needed VC's to stay inside RW. --    Distance --  200' --    Stairs/Curb   Stairs? --  Yes --    Stairs   # Steps  --  6 --    Rails --  Bilateral --    Device --  No Device --    Assistance --  Contact guard assistance --    Comment --  Pt amb well up and down stairs. --    Wheelchair Activities   Left Leg Rest Level of Assistance --  Stand by assistance --    Right Leg Rest Level of Assistance --  Stand by assistance --    Left Brakes Level of Assistance --  Stand by assistance --    Right Brakes Level of Assistance --  Stand by Assist --    Exercises   Comments --  --  Performed FTD. Conditions Requiring Skilled Therapeutic Intervention   Body structures, Functions, Activity limitations --  --  --    Assessment --  --  --    Prognosis --  --  --    Activity Tolerance   Activity Tolerance --  --  --         09/20/18 1152   General   Response To Previous Treatment --    Family / Caregiver Present --    Subjective   Subjective --    Pain Screening   Patient Currently in Pain --    Pain Assessment   Pain Level --    Pre Treatment Pain Screening   Pain at present --    Scale Used --    Intervention List --    Bed Mobility   Rolling --    Supine to Sit --    Sit to Supine --    Scooting --    Transfers   Sit to Stand --    Stand to sit --    Bed to Chair --    Stand Pivot Transfers --    Car Transfer --    Ambulation   Ambulation?  --    Ambulation 1   Surface --    Device --    Assistance --    Quality of Gait --    Distance --    Stairs/Curb   Stairs? --    Stairs   # Steps  --    Rails --    Device --    Assistance --    Comment --    Wheelchair Activities   Left Leg Rest Level of Assistance --    Right Leg Rest Level of Assistance --    Left Brakes Level of Assistance --    Right Brakes Level of Assistance --    Exercises   Comments --    Conditions Requiring Skilled Therapeutic Intervention   Body structures, Functions, Activity limitations Decreased functional mobility ; Decreased ADL status; Decreased safe awareness;Decreased cognition   Assessment Performed FTD w/ wife. Pt continues to show decreased safety awareness during TF's and amb. Pt needed constant VC's for handplacement and RW placement during activities. Pt toleated all activities well / minimal fatigue. Prognosis Good   Activity Tolerance   Activity Tolerance Patient limited by cognitive status   Electronically signed by Chika Carvajal PTA on 9/20/2018 at 11:54 AM         Cosigned by: García Weaver PT at 9/20/2018  1:15 PM         RECORD REVIEW: Previous medical records, medications were reviewed at today's visit    IMPRESSION:   1. Status post resection of brain tumoron Decadron/Keppra  2. Diabeteson medicationsmonitor blood sugar  3. Seizure prophylaxison Keppra  4. GIbowel regimen/proton pump inhibitor  5. Hyperlipidemiastatin  6. Mood disorderon Zoloft  7. Insomniaon trazodone  8. Pain controlTemple when necessary  9. PT/Ot  10.  DVT prophylaxisSCDs    Continue current care    ELOS sept 26th    Expected duration and frequency therapy: 180 minutes per day, 5 days per week    Martin Cates  [phone] CELL  Dr Dwayne Phelan

## 2018-09-21 NOTE — PROGRESS NOTES
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 Functional Mobility   Functional - Mobility Device Rolling Walker   Assist Level Contact guard assistance   Functional Mobility Comments modified RW with wheels on the inside to avoid catching on doorjambs    Toilet Transfers   Toilet - Technique Ambulating   Equipment Used Grab bars   Toilet Transfer Contact guard assistance   Toilet Transfers Comments spouse completed   Tub Transfers   Tub - Transfer From Rolling walker   Tub - Transfer Type To and From   Tub - Transfer To Transfer tub bench   Tub - Technique Ambulating   Tub Transfers Contact guard   Tub Transfers Comments max verbal and tactile cues d/t tub and t//f in being on L    Wheelchair Bed Transfers   Wheelchair/Bed - Technique Ambulating   Level of Asssistance Stand by assistance   Perception   Unilateral Attention Cues to attend left visual field   Assessment   Performance deficits / Impairments Decreased functional mobility ; Decreased ADL status; Decreased cognition;Decreased safe awareness;Decreased high-level IADLs;Decreased vision/visual deficit; Decreased balance   Assessment Left inattention to environment main safety concern with mobility tasks.     Patient Education DME needs, t/fs techs for caregiver, environmental modifications for Left Inattention   Timed Code Treatment Minutes 60 Minutes   Activity Tolerance   Activity Tolerance Patient Tolerated treatment well   Safety Devices   Safety Devices in place Yes   Type of devices Left in chair  (with Spouse)   Equipment Recommendations   Equipment Needed Yes   Wheelchair (16\" width)

## 2018-09-21 NOTE — PROGRESS NOTES
understands basic needs 75-90%+ of the time  Expression: 4 - Expresses basic ideas/needs 75-90%+ of the time  Social Interaction: 5 - Patient is appropriate with supervision/cues  Problem Solving: 3 - Patient solves simple/routine tasks 50%-74%  Memory: 3 - Patient remembers 50%-74% of the time    ASSESSMENT:  Assessment: [x]Progressing towards goals          []Not Progressing towards goals    Patient Tolerance of Treatment:   [x]Tolerated well []Tolerated fair []Required rest breaks []Fatigued    Education:  Learner:  [x]Patient          []Significant Other          []Other  Education provided regarding:  [x]Goals and POC   []Diet and swallowing precautions    []Home Exercise Program  []Progress and/or discharge information  Method of Education:  [x]Discussion          []Demonstration          []Handout          []Other  Evaluation of Education:   [x]Verbalized understanding   []Demonstrates without assistance  []Demonstrates with assistance  []Needs further instruction     []No evidence of learning                  []No family present      Plan: [x]Continue with current plan of care    []Modify current plan of care as follows:    []Discharge patient    Discharge Location:    Services/Supervision Recommended:      [x]Patient continues to require treatment by a licensed therapist to address functional deficits as outlined in the established plan of care.           Electronically Signed By:  Althea Parry  0/71/9468,7:31 AM.

## 2018-09-21 NOTE — PROGRESS NOTES
Silvia Macias  [id number]     09/21/18 1149 09/21/18 1150   General   Response To Previous Treatment Patient with no complaints from previous session. --    Family / Caregiver Present Yes --    Subjective   Subjective Pt agreed to therapy this morning.   --    Pain Screening   Patient Currently in Pain No --    Pain Assessment   Pain Level 0 --    Pre Treatment Pain Screening   Pain at present 0 --    Scale Used Numeric Score --    Intervention List Patient able to continue with treatment --    Bed Mobility   Rolling Independent --    Supine to Sit Independent --    Sit to Supine Independent --    Scooting Independent --    Transfers   Sit to Stand Contact guard assistance --    Stand to sit Contact guard assistance --    Bed to Chair Contact guard assistance --    Stand Pivot Transfers Contact guard assistance --    Car Transfer Contact guard assistance --    Ambulation   Ambulation? Yes --    Ambulation 1   Surface level tile --    Device Rolling Walker --    Assistance Contact guard assistance --    Quality of Gait Pt continues to lean to L side, showed forward flex posture, and needed VC's to stay inside RW. --    Distance 200' --    Stairs/Curb   Stairs? Yes --    Stairs   # Steps  6 --    Rails Bilateral --    Device No Device --    Assistance Contact guard assistance --    Comment Pt amb well up and down stairs. --    Wheelchair Activities   Wheelchair Parts Management No --    Propulsion No --    Exercises   Comments --  Performed FTD. Conditions Requiring Skilled Therapeutic Intervention   Body structures, Functions, Activity limitations --  Decreased functional mobility ; Decreased safe awareness;Decreased cognition   Assessment --  Performed FTD w/ Nephew. Pt tolerated all well.     Prognosis --  Good   Activity Tolerance   Activity Tolerance --  Patient limited by cognitive status   Electronically signed by Moisés Strong PTA on 9/21/2018 at 11:51 AM

## 2018-09-21 NOTE — PROGRESS NOTES
PROGRESS NOTE  Patient name: Lenin Soliman  Patient : 1940  Room: 827    SUBJECTIVE: follow-up GBM    INTERVAL HISTORY  Todd Mariano presented to [facility] 2018 with a 1 month history of dizziness, low back pain, confusion.      MRI brain with and without gadolinium on 2018 revealed a 5.2 x 3.3 x 4.6 cm right supratentorial mass.     CT chest with contrast 2018 at [facility] was negative for any obvious primary malignancy but did reveal a 5 mm NJ nodule.     CT abdomen and pelvis with contrast 2018 at [facility] was negative for any obvious primary or metastatic malignancy.     Dr. Arlene Purcell performed a right parietal craniotomy with tumor debulking on 2018. Frozen section revealed neoplastic tissue present. FINAL PATHOLOGY consistent with GBM. Post-operative MRI of the brain on 18 at [facility] revealed residual enhancing tumor of the right posterior parietal lobe creating mass effect on the posterior horn lateral ventricle. Pneumocephalus and small right subdural collection related to recent surgery noted. Margot Steiner was transferred to [facility] on 18 with medical oncology consultation requested.     SUBJECTIVE:    REVIEW OF SYSTEMS:    Constitutional: no fever, no night sweats; generalized weakness  HEENT: no blurring of vision, no double vision                                   Lungs: no cough, no shortness of breath, no wheeze  CVS: no palpitation, no chest pain, no shortness of breath  GI: no abdominal pain, no nausea , no vomiting, no constipation  LACIE: no dysuria, frequency and urgency, no hematuria  Musculoskeletal: no joint pain, swelling , stiffness  Endocrine: no polyuria, polydypsia, no cold or heat intolerence; positive for DM  Hematology: no anemia, no easy brusing or bleeding, no hx of clotting disorder  Dermatology: no skin rash, no eczema, no pruritis; positive for craniotomy  Psychiatry: no suicide ideation  Neurology: positive for resected brain lesion, left sided

## 2018-09-22 NOTE — PLAN OF CARE
Problem: Falls - Risk of:  Goal: Will remain free from falls  Will remain free from falls   Outcome: Ongoing  Bed alarm set, call light within reach, side rails up times two  Goal: Absence of physical injury  Absence of physical injury   Outcome: Ongoing  Bed alarm set, call light within reach, side rails up times two    Problem: Serum Glucose Level - Abnormal:  Goal: Ability to maintain appropriate glucose levels has stabilized  Ability to maintain appropriate glucose levels has stabilized   Outcome: Ongoing      Problem: Mood - Altered:  Goal: Mood stable  Mood stable   Outcome: Ongoing  Patients mood stable this shift    Problem: Pain:  Goal: Pain level will decrease  Pain level will decrease   Outcome: Ongoing  Patient encouraged to ask for pain med as needed to control acute pain    Problem: Anxiety:  Goal: Level of anxiety will decrease  Level of anxiety will decrease   Outcome: Ongoing  No signs of anxiety this shift. Problem: Infection - Surgical Site:  Goal: Will show no infection signs and symptoms  Will show no infection signs and symptoms   Outcome: Ongoing  No complications or infection noted on this shift. Vital signs stable. Problem: Risk for Impaired Skin Integrity  Goal: Tissue integrity - skin and mucous membranes  Structural intactness and normal physiological function of skin and  mucous membranes.    Outcome: Ongoing  Turn and reposition every 2 hours

## 2018-09-23 NOTE — PLAN OF CARE
Problem: Falls - Risk of:  Goal: Will remain free from falls  Will remain free from falls   Outcome: Ongoing    Goal: Absence of physical injury  Absence of physical injury   Outcome: Ongoing      Problem: Serum Glucose Level - Abnormal:  Goal: Ability to maintain appropriate glucose levels has stabilized  Ability to maintain appropriate glucose levels has stabilized   Outcome: Ongoing      Problem: Mood - Altered:  Goal: Mood stable  Mood stable   Outcome: Ongoing      Problem: Pain:  Goal: Pain level will decrease  Pain level will decrease   Outcome: Ongoing      Problem: Anxiety:  Goal: Level of anxiety will decrease  Level of anxiety will decrease   Outcome: Ongoing      Problem: Infection - Surgical Site:  Goal: Will show no infection signs and symptoms  Will show no infection signs and symptoms   Outcome: Ongoing      Problem: Risk for Impaired Skin Integrity  Goal: Tissue integrity - skin and mucous membranes  Structural intactness and normal physiological function of skin and  mucous membranes.    Outcome: Ongoing

## 2018-09-23 NOTE — PROGRESS NOTES
30 mL, Oral, Daily PRN, Lady Mercedes MD, 30 mL at 09/16/18 1600    docusate sodium (COLACE) capsule 100 mg, 100 mg, Oral, BID PRN, Lady Mercedes MD    bisacodyl (DULCOLAX) suppository 10 mg, 10 mg, Rectal, Daily PRN, Lady Mercedes MD    insulin lispro (HUMALOG) injection vial 0-6 Units, 0-6 Units, Subcutaneous, TID WC, Lady Mercedes MD, 1 Units at 09/22/18 0814    insulin lispro (HUMALOG) injection vial 0-3 Units, 0-3 Units, Subcutaneous, Nightly, Lady Mercedes MD, 1 Units at 09/20/18 2130    glucose (GLUTOSE) 40 % oral gel 15 g, 15 g, Oral, PRN, Lady Mercedes MD    dextrose 50 % solution 12.5 g, 12.5 g, Intravenous, PRN, Lady Mercedes MD    glucagon (rDNA) injection 1 mg, 1 mg, Intramuscular, PRN, Lady Mercedes MD    dextrose 5 % solution, 100 mL/hr, Intravenous, PRN, Lady Mercedes MD    insulin glargine (LANTUS) injection vial 20 Units, 20 Units, Subcutaneous, Nightly, Lady Mercedes MD, 20 Units at 09/22/18 2106    Allergies:  Patient has no known allergies. Social History:   TOBACCO:   reports that he has never smoked. He has never used smokeless tobacco.  ETOH:   reports that he does not drink alcohol. Family History:   Family History   Problem Relation Age of Onset    Diabetes Mother          PHYSICAL EXAM:  /62   Pulse 60   Temp 97.4 °F (36.3 °C)   Resp 18   Ht 6' (1.829 m)   Wt 173 lb 3.2 oz (78.6 kg)   SpO2 96%   BMI 23.49 kg/m²     Constitutional  well developed, well nourished. Eyes  conjunctiva normal.   Ear, nose, throat - No scars, masses, or lesions over external nose or ears, no atrophy of tongue  Neck-symmetric, no masses noted, no jugular vein distension  Respiration- chest wall appears symmetric, good expansion,   normal effort without use of accessory muscles  Musculoskeletal  no significant wasting of muscles noted, no bony deformities  Extremities-no clubbing, cyanosis or edema  Skin  warm, dry, and intact. No rash, erythema, or pallor.   Psychiatric  mood, affect, and behavior appear normal.      Neurological exam  Awake, alert, fluent oriented appropriate affect  Attention and concentration appear appropriate  Recent and remote memory appears unremarkable  Speech normal without dysarthria  No clear issues with language of fund of knowledge     Cranial Nerve Exam     CN III, IV,VI-EOMI, No nystagmus, conjugate eye movements, no ptosis    CN VII-no facial assymetry       Motor Exam  antigravity throughout upper and lower extremities bilaterally      Tremors- no tremors in hands or head noted     Gait  Not tested    Nursing/pcp notes, imaging,labs and vitals reviewed. PT,OT and/or speech notes reviewed    Lab Results   Component Value Date    WBC 9.3 09/20/2018    HGB 11.6 (L) 09/20/2018    HCT 35.1 (L) 09/20/2018    MCV 87.5 09/20/2018     09/20/2018     Lab Results   Component Value Date     09/20/2018    K 4.3 09/20/2018     09/20/2018    CO2 27 09/20/2018    BUN 22 09/20/2018    CREATININE 1.0 09/20/2018    GLUCOSE 222 (H) 09/20/2018    CALCIUM 8.7 (L) 09/20/2018    PROT 5.0 (L) 09/20/2018    LABALBU 3.3 (L) 09/20/2018    BILITOT <0.2 09/20/2018    ALKPHOS 90 09/20/2018    AST 13 09/20/2018    ALT 26 09/20/2018    LABGLOM >60 09/20/2018   No results found for: INR, PROTIME       Isa Saver  [id number]       09/20/18 1149 09/20/18 1150 09/20/18 1151   General   Response To Previous Treatment Patient with no complaints from previous session. --  --    Family / Caregiver Present Yes --  --    Subjective   Subjective Pt agreed to therapy this morning.    --  --    Pain Screening   Patient Currently in Pain No --  --    Pain Assessment   Pain Level 0 --  --    Pre Treatment Pain Screening   Pain at present 0 --  --    Scale Used Numeric Score --  --    Intervention List Patient able to continue with treatment --  --    Bed Mobility   Rolling Independent --  --    Supine to Sit Independent --  --    Sit to Supine Independent --  -- Scooting Independent --  --    Transfers   Sit to Stand --  Contact guard assistance --    Stand to sit --  Contact guard assistance --    Bed to Chair --  Contact guard assistance --    Stand Pivot Transfers --  Contact guard assistance --    Car Transfer --  Contact guard assistance --    Ambulation   Ambulation? --  Yes --    Ambulation 1   Surface --  level tile --    Device --  Rolling Walker --    Assistance --  Minimal assistance --    Quality of Gait --  Pt continues to lean to L side, showed forward flex posture, and needed VC's to stay inside RW. --    Distance --  200' --    Stairs/Curb   Stairs? --  Yes --    Stairs   # Steps  --  6 --    Rails --  Bilateral --    Device --  No Device --    Assistance --  Contact guard assistance --    Comment --  Pt amb well up and down stairs. --    Wheelchair Activities   Left Leg Rest Level of Assistance --  Stand by assistance --    Right Leg Rest Level of Assistance --  Stand by assistance --    Left Brakes Level of Assistance --  Stand by assistance --    Right Brakes Level of Assistance --  Stand by Assist --    Exercises   Comments --  --  Performed FTD. Conditions Requiring Skilled Therapeutic Intervention   Body structures, Functions, Activity limitations --  --  --    Assessment --  --  --    Prognosis --  --  --    Activity Tolerance   Activity Tolerance --  --  --         09/20/18 1152   General   Response To Previous Treatment --    Family / Caregiver Present --    Subjective   Subjective --    Pain Screening   Patient Currently in Pain --    Pain Assessment   Pain Level --    Pre Treatment Pain Screening   Pain at present --    Scale Used --    Intervention List --    Bed Mobility   Rolling --    Supine to Sit --    Sit to Supine --    Scooting --    Transfers   Sit to Stand --    Stand to sit --    Bed to Chair --    Stand Pivot Transfers --    Car Transfer --    Ambulation   Ambulation?  --    Ambulation 1   Surface --    Device --    Assistance --

## 2018-09-23 NOTE — PROGRESS NOTES
Pt going out on pass with wife. Taken to front door per transport and assisted to car. Will return later thes evening. Wife and nephrew as been to training to assisit pt. Absence permitsigned per patient.

## 2018-09-24 NOTE — PROGRESS NOTES
#2:  Goal 2: Patient will complete orientation tasks (time, place, biographical information) with 100% accuracy given minimal verbal cues and prompts. MET    SHORT TERM GOAL #3:  Goal 3: Patient will recall 5 facts at paragraph level reading to improve short term recall with minimal prompts/verbal cues. not met     SHORT TERM GOAL #4:  Goal 4: Patient will recall/utilize safety precautions to increase safety with functional ADL tasks to return to independent living. not met    SHORT TERM GOAL #5:  Goal 5: Patient will recall 5 recent/daily events with minimal verbal cues and prompts to improve orientation of environment and situation.        Comprehension: 4 - Patient understands basic needs 75-90%+ of the time  Expression: 5 - Expresses basic ideas/needs only (hungry/hot/pain)  Social Interaction: 5 - Patient is appropriate with supervision/cues  Problem Solving: 3 - Patient solves simple/routine tasks 50%-74%  Memory: 3 - Patient remembers 50%-74% of the time    ASSESSMENT:  Assessment: [x]Progressing towards goals          []Not Progressing towards goals    Patient Tolerance of Treatment:   [x]Tolerated well []Tolerated fair []Required rest breaks []Fatigued    Education:  Learner:  [x]Patient          []Significant Other          []Other  Education provided regarding:  [x]Goals and POC   []Diet and swallowing precautions    []Home Exercise Program  []Progress and/or discharge information  Method of Education:  [x]Discussion          [x]Demonstration          []Handout          []Other  Evaluation of Education:   []Verbalized understanding   []Demonstrates without assistance  [x]Demonstrates with assistance  []Needs further instruction     []No evidence of learning                  []No family present      Plan: [x]Continue with current plan of care    []Modify current plan of care as follows:    []Discharge patient    Discharge Location:    Services/Supervision Recommended:      [x]Patient continues to require treatment by a licensed therapist to address functional deficits as outlined in the established plan of care.

## 2018-09-24 NOTE — PROGRESS NOTES
PROGRESS NOTE  Patient name: Lemon Scheuermann  Patient : 1940  Room: 827    SUBJECTIVE: follow-up GBM, patient with persistent left sided weakness, though doing well with PT. Went home on a pass yesterday. INTERVAL HISTORY  Keri Reilly presented to [facility] 2018 with a 1 month history of dizziness, low back pain, confusion.      MRI brain with and without gadolinium on 2018 revealed a 5.2 x 3.3 x 4.6 cm right supratentorial mass.     CT chest with contrast 2018 at [facility] was negative for any obvious primary malignancy but did reveal a 5 mm NJ nodule.     CT abdomen and pelvis with contrast 2018 at [facility] was negative for any obvious primary or metastatic malignancy.     Dr. Elmira Nicole performed a right parietal craniotomy with tumor debulking on 2018. Frozen section revealed neoplastic tissue present. FINAL PATHOLOGY consistent with GBM. Post-operative MRI of the brain on 18 at [facility] revealed residual enhancing tumor of the right posterior parietal lobe creating mass effect on the posterior horn lateral ventricle. Pneumocephalus and small right subdural collection related to recent surgery noted. Helene Winter was transferred to [facility] on 18 with medical oncology consultation requested.     SUBJECTIVE:    REVIEW OF SYSTEMS:    Constitutional: no fever, no night sweats; generalized weakness  HEENT: no blurring of vision, no double vision                                   Lungs: no cough, no shortness of breath, no wheeze  CVS: no palpitation, no chest pain, no shortness of breath  GI: no abdominal pain, no nausea , no vomiting, no constipation  LACIE: no dysuria, frequency and urgency, no hematuria  Musculoskeletal: no joint pain, swelling , stiffness  Endocrine: no polyuria, polydypsia, no cold or heat intolerence; positive for DM  Hematology: no anemia, no easy brusing or bleeding, no hx of clotting disorder  Dermatology: no skin rash, no eczema, no pruritis; positive for

## 2018-09-24 NOTE — PROGRESS NOTES
stiffness  Respiratory: No shortness of breath  Cardiovascular: No chest pain No palpitations  Gastrointestinal: No abdominal pain    Genitourinary: No Dysuria  Neurological: No headache, no confusion    Past Medical History:      Diagnosis Date    Chronic allergic rhinitis 11/20/2017    Gastroesophageal reflux disease without esophagitis 11/20/2017    Hyperlipidemia     Hypertension     Lumbar disc disease 9/5/2018    Type 2 diabetes mellitus without complication [facility])        Past Surgical History:  No past surgical history on file.     Medications in Hospital:      Current Facility-Administered Medications:     dexamethasone (DECADRON) tablet 1 mg, 1 mg, Oral, Q12H, Vivi Perezington, APRN, 1 mg at 09/23/18 2044    miconazole (MICOTIN) 2 % powder, , Topical, BID, Aimee Mix MD    HYDROcodone-acetaminophen (NORCO) 5-325 MG per tablet 1 tablet, 1 tablet, Oral, Q8H PRN, Aimee Mix MD, 1 tablet at 09/18/18 2126    insulin lispro (HUMALOG) injection vial 8 Units, 8 Units, Subcutaneous, TID AC, Aimee Mix MD, 8 Units at 09/23/18 1721    levETIRAcetam (KEPPRA) tablet 500 mg, 500 mg, Oral, BID, Aimee Mix MD, 500 mg at 09/23/18 2040    LORazepam (ATIVAN) tablet 0.5 mg, 0.5 mg, Oral, Q8H PRN, Aimee Mix MD, 0.5 mg at 09/16/18 2038    metFORMIN (GLUCOPHAGE) tablet 500 mg, 500 mg, Oral, BID WC, Aimee Mix MD, 500 mg at 09/23/18 1716    pantoprazole (PROTONIX) tablet 40 mg, 40 mg, Oral, Daily, Aimee Mix MD, 40 mg at 09/23/18 0834    pravastatin (PRAVACHOL) tablet 40 mg, 40 mg, Oral, Nightly, Aimee Mix MD, 40 mg at 09/23/18 2039    sertraline (ZOLOFT) tablet 50 mg, 50 mg, Oral, Daily, Aimee Mix MD, 50 mg at 09/23/18 0834    traZODone (DESYREL) tablet 50 mg, 50 mg, Oral, Nightly, Aimee Mix MD, 50 mg at 09/23/18 2040    acetaminophen (TYLENOL) tablet 650 mg, 650 mg, Oral, Q4H PRN, Aimee Mix MD    magnesium hydroxide (MILK OF MAGNESIA) 400 MG/5ML suspension 30 mL, bathroom; Other   Assist Level Contact guard assistance   Functional Mobility Comments tends to furniture trail, did well with cane- does need some kind of AD at this time   Perception   Overall Perceptual Status (multiple acts Dynavision and pencil paper tasks)   Assessment   Performance deficits / Impairments Decreased functional mobility ; Decreased ADL status; Decreased cognition;Decreased safe awareness;Decreased high-level IADLs;Decreased vision/visual deficit; Decreased balance   Assessment Improvements made with mobility   Timed Code Treatment Minutes 60 Minutes   Activity Tolerance   Activity Tolerance Patient Tolerated treatment well   Safety Devices   Safety Devices in place Yes   Type of devices Call light within reach; Bed alarm in place                  RECORD REVIEW: Previous medical records, medications were reviewed at today's visit    IMPRESSION:   1. Status post resection of brain tumoron Decadron/Keppra  2. Diabeteson medicationsmonitor blood sugar  3. Seizure prophylaxison Keppra  4. GIbowel regimen/proton pump inhibitor  5. Hyperlipidemiastatin  6. Mood disorderon Zoloft  7. Insomniaon trazodone  8. Pain control[place] when necessary  9. PT/Ot  10.  DVT prophylaxisSCDs    Continue present care    Wife and patient agreeable to staying till Wednesday    Expected duration and frequency therapy: 180 minutes per day, 5 days per week    Queenie Lopez  [phone] CELL  Dr Du Manley

## 2018-09-24 NOTE — PATIENT CARE CONFERENCE
[facility] ACUTE INPATIENT REHABILITATION  TEAM CONFERENCE NOTE    Date: 2018  Patient Name: Tyra Jarrell        MRN: [id number]    : 1940  (69 y.o.)  Gender: male      Diagnosis: 9/10 RIGHT PARIETAL CRANIOTOMY FOR TUMOR EXCISION      PHYSICAL THERAPY  STRENGTH  Strength RLE  Strength RLE: WFL  Strength LLE  Strength LLE: Exception  Comment: GROSSLY 4/5  ROM        BED MOBILITY  Bed Mobility  Rolling: Independent  Supine to Sit: Independent  Sit to Supine: Independent  Scooting: Independent  TRANSFERS  Transfers  Sit to Stand: Contact guard assistance  Stand to sit: Contact guard assistance  Bed to Chair: Contact guard assistance  Stand Pivot Transfers: Contact guard assistance  Car Transfer: Contact guard assistance  WHEELCHAIR PROPULSION  Propulsion 1  Propulsion: Manual  Level: Level Tile  Method: [place]  Level of Assistance: Contact guard assistance  Description/ Details: Pt needed LUE placed on rim to start propulsion. Pt had some difficulty keeping it straight. Distance: 100'  AMBULATION  Ambulation 1  Surface: level tile  Device: Single point cane  Other Apparatus: Wheelchair follow  Assistance: Minimal assistance  Quality of Gait: Attempted amb w/ SPC per OT. Pt kept leaning to L side becoming off balance needing Min A to correct. Pt was very unsteady and had several mis-steps. Pt also had difficulty w/ cane sequencing. On 2nd amb post balance ther ex's Pt continued to shift to L side becoming off balance needing Min A. Pt imrpoved w/ sequencing, but is still very unsteady. ,    Distance: 150'x2  Comments: PATIENT STATED HE DID NOT NEED TO AMBULATION WITH LOULOU Baxter  # Steps : 6  Rails: Bilateral  Device: No Device  Assistance: Contact guard assistance  Comment: Pt amb well up and down stairs.   FIM SCORES  Bed, Chair, Wheel Chair: 4 - Requires steadying assistance only <25% assist  and/or requires assist with one leg only  Walk: 4 - Contact Guard/Minimal Assistance Requires up to Contact Guard or Minimal Assistance to walk at least 150 feet  Wheel Chair: 2 - Maximal Assistance Requires up to [address] requires assistance of one person to operate wheelchair between  feet  Stairs: 2- Maximal Assistance Performs 25-49% of the effort to go up and down 4 to 6 stairs and requires the assistance of one person only  GOALS:  Short term goals  Time Frame for Short term goals: 2 WEEKS  Short term goal 1: TF FIM 4  Short term goal 2: AMB FIM 4  Short term goal 3: WC FIM 5  Short term goal 4: STEP FIM 1  Short term goal 5: HEP SBA    Long term goals  Time Frame for Long term goals : 4 WEEKS  Long term goal 1: TF FIM 6  Long term goal 2: AMB FIM 6  Long term goal 3: WC FIM 6  Long term goal 4: STEP FIM 5E  Long term goal 5: HEP INDEP    ASSESSMENT:  Assessment: Pt continues to show balance deficits. Attempted amb w/ SPC per OT. Pt kept leaning to L side becoming off balance needing Min A to correct. Pt was very unsteady and had several mis-steps. Pt also had difficulty w/ cane sequencing. On 2nd amb post balance ther ex's Pt continued to shift to L side becoming off balance needing Min A. Pt imrpoved w/ sequencing, but is still very unsteady. ,        SPEECH THERAPY  Primary Provider: Shawna Marshall MD  Precautions: Fall     Subjective: Patient alert and cooperative with all therapy tasks this date.      Objective: Patient completed orientation tasks with 100% accuracy. Patient able to recall temporal orientation, place, and biographical information. Patient was able to recall having one doctor come in, however when asked if the other doctor had entered the room, he was not able to recall with moderate verbal cues. Patient able to accurately recall what he ate for breakfast and what his spouse told him to recall about therapy. He was to tell physical therapist he had weights.  Patient able to recall this 20-30 minutes later.      Patient wrote down 5 related items and (hungry/hot/pain)  Social Interaction: 5 - Patient is appropriate with supervision/cues  Problem Solving: 3 - Patient solves simple/routine tasks 50%-74%  Memory: 3 - Patient remembers 50%-74% of the time     ASSESSMENT:  Assessment: [x]Progressing towards goals               []Not Progressing towards goals     GOALS MET:  STG 0 LTG  OCCUPATIONAL THERAPY    OT FIM PROGRESS   ADMIT SCORE CURRENT SCORE GOAL   EATING Eatin - Feeds self with setup/supervision/cues and/or requires only setup/supervision/cues to perform tube feedings Eatin - Patient feeds self GOAL: Eatin   GROOMING Groomin - Requires setup/cues to do all tasks Groomin - Requires setup/cues to do all tasks GOAL: Groomin   BATHING Bathin - Able to bathe 8-9 areas Bathin - Able to bathe 8-9 areas GOAL: Bathin   UPPER EXTREMITY DRESSING Dressing-Upper: 4 - Requires assist with buttons/zippers only and/or requires assist with one arm only Dressing-Upper: 4 - Requires assist with buttons/zippers only and/or requires assist with one arm only (assist to orient shirt ) GOAL: Dressing-Upper: 7   LOWER EXTREMITY DRESSING Dressing-Lower: 3 - Requires assist with 2-3 parts of dressing Dressing-Lower: 4 - Requires assist with buttons/zippers/shoelaces and/or assist with shoes only (CGA) GOAL: Dressing-Lower: 6   TOILETING Toiletin - Requires steadying assistance only Toiletin - Requires steadying assistance only (CGA) GOAL: Toiletin   TOILET TRANSFER Toilet Transfer: 4 - Requires steadying assistance only < 25% assist Toilet Transfer: 4 - Requires steadying assistance only < 25% assist GOAL: Toilet: 6   TUB/SHOWER TRANSFER Primary Mode: Shower     Shower Transfer: 0 - Activity does not occur   Primary Mode: Shower     Shower Transfer: 4 - Minimal contact assistance, pt. expends 75% or more effort     Primary Mode: Shower  GOAL: Tub, Shower: 6         Cognition:  Comprehension: 4 - Patient understands basic needs 75-90%+ of the time   Expression: 5 - Expresses basic ideas/needs only (hungry/hot/pain)  Social Interaction: 5 - Patient is appropriate with supervision/cues  Problem Solving: 3 - Patient solves simple/routine tasks 50%-74%  Memory: 4 - Patient remembers 75-90%+ of the time    UE Functioning:  WFLs    Pain Assessment:   None reported during tx       STGs:  Short term goals  Time Frame for Short term goals: 1 week  Short term goal 1: complete UB dressing with supervision  Short term goal 2: complete LB dressing with supervision  Short term goal 3: complete overall bathing with supervision  Short term goal 4: MET  Short term goal 5: MET  Short term goal 6: MET    LTGs:  Long term goals  Time Frame for Long term goals :   2 weeks  Long term goal 1: complete overall toileting with modified independence  Long term goal 2: complete overall dressing with modified independence  Long term goal 3: complete overall bathing with modified independence   Long term goal 4: complete simple ambulatory home making task with modified independence  Long term goal 5: complete HEP with independence    Assessment:  Decreased functional mobility ; Decreased ADL status; Decreased cognition; Decreased safe awareness; Decreased high-level IADLs; Decreased vision/visual deficit; Decreased balance; Perceptual deficits    NUTRITION  Current Wt: Weight: 173 lb 3.2 oz (78.6 kg) / Body mass index is 23.49 kg/m². Admission Wt: Admission Body Weight: 170 lb (77.1 kg)  Oral Diet Orders: Carb Control 4 Carbs/Meal   Oral Nutrition Supplement (ONS) Orders: None  PO % Please see nutrition note for details.       NURSING    FIMS:  Bladder  Level of Assistance: Bladder Level of Assistance: 4- Requires Minimal assistance to manage or place device, patient performs 75% or more of the bladder management tasks  Frequency of Accidents: Bladder Frequency of Accidents: 6 - No accidents,uses device like urinal, bedpan, absorbant pad, or requires medication Dinora Schmidt [address], [address] [place]  Rehab Director:  Ubaldo Ortega approve the established interdisciplinary plan of care as documented within the medical record of [address] [place] [address]

## 2018-09-24 NOTE — PROGRESS NOTES
Karrie Dunaway  [id number]     09/24/18 1035 09/24/18 1037 09/24/18 1038   General   Response To Previous Treatment Patient with no complaints from previous session. --  --    Family / Caregiver Present No --  --    Subjective   Subjective Pt agreed to therapy this morning. --  --    Pain Screening   Patient Currently in Pain No --  --    Pain Assessment   Pain Level 0 --  --    Pre Treatment Pain Screening   Pain at present 0 --  --    Bed Mobility   Rolling Independent --  --    Supine to Sit Independent --  --    Sit to Supine Independent --  --    Transfers   Sit to Stand Contact guard assistance --  --    Stand to sit Contact guard assistance --  --    Bed to Chair Contact guard assistance --  --    Stand Pivot Transfers Contact guard assistance --  --    Ambulation   Ambulation? Yes --  --    Ambulation 1   Surface level tile --  --    Device Single point cane --  --    Assistance Minimal assistance --  --    Quality of Gait Attempted amb w/ SPC per OT. Pt kept leaning to L side becoming off balance needing Min A to correct. Pt was very unsteady and had several mis-steps. Pt also had difficulty w/ cane sequencing. On 2nd amb post balance ther ex's Pt continued to shift to L side becoming off balance needing Min A. Pt imrpoved w/ sequencing, but is still very unsteady. ,   --  --    Distance 150'x2 --  --    Exercises   Comments --  Pt performed standing static balance ex's facing mirror, feet together w/ eyes open, then slightly apart when reaching and weight shifting. Pt also performed sidestepping and retrowalking. --    Conditions Requiring Skilled Therapeutic Intervention   Body structures, Functions, Activity limitations --  --  Decreased functional mobility ; Decreased safe awareness;Decreased cognition;Decreased balance   Assessment --  --  Pt continues to show balance deficits. Attempted amb w/ SPC per OT. Pt kept leaning to L side becoming off balance needing Min A to correct.   Pt was very unsteady and had several mis-steps. Pt also had difficulty w/ cane sequencing. On 2nd amb post balance ther ex's Pt continued to shift to L side becoming off balance needing Min A. Pt imrpoved w/ sequencing, but is still very unsteady. ,       Prognosis --  --  Good   Activity Tolerance   Activity Tolerance --  --  Patient limited by cognitive status   Electronically signed by Corinne Morrow, PTA on 9/24/2018 at 10:45 AM

## 2018-09-25 NOTE — PROGRESS NOTES
Patient:   Lemon Scheuermann  MR#:    [id number]   Room:    0827/827-01   YOB: 1940  Date of Progress Note: 9/25/2018  Time of Note                           8:29 AM  Consulting Physician:   Gregg Lee M.D. Attending Physician:  Gregg Lee MD     Chief complaint Status post surgery for brain tumor     S:This 69 y.o. male  pt admitted to [facility] on 9/6/18 from Dr. Mckeon Never office. Pt had been referred to Dr. Elmira Nicole by his PCP, Dr. Jenifer Briceno. Pt had presented at Dr. Corcoran Records office w/1 month hx of lowback pain, dizziness and per pt's wife, processing and cognitive issues. He also complained that his L leg wasn't functioning properly causing several LOB's, but was able to catch himself before falling. 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 He was admitted to the hospital for further evaluation. Upon admission he did have some agitation and confusion. CT's of abd/pelvis and chest were done  and were neg. He was started on Keppra & IV Decadron. He is a diabetic and the steroids have been causing his glucose to run from the 600's to 300's. His A1C was 10.9 on 9/8/18. He was only on metforman at home but was started on Levemir 20 units nightly an a mod to high dose sliding scale insulin. On 9/10/18, he underwent a R parietal craniotomy w/ image guided stereotactic navigation. He tolerated the procedure well and was moved out of ICU on 9/12/18. His steroids are being weaned. He is alert and oriented, but does have decreased safety awareness and decreased awareness on the L. Pathology reports are not back at this time. He is now medically stable and is participating w/therapy. He is ready to begin rehab w/plan of returning home after rehab dc.  No new issues    REVIEW OF SYSTEMS:  Constitutional: No fevers No chills  Neck:No stiffness  Respiratory: No shortness of breath  Cardiovascular: No chest pain No palpitations  Gastrointestinal: No abdominal pain    Genitourinary: No Dysuria  Neurological: No headache, no confusion    Past Medical History:      Diagnosis Date    Chronic allergic rhinitis 11/20/2017    Gastroesophageal reflux disease without esophagitis 11/20/2017    Hyperlipidemia     Hypertension     Lumbar disc disease 9/5/2018    Type 2 diabetes mellitus without complication [facility])        Past Surgical History:  No past surgical history on file.     Medications in Hospital:      Current Facility-Administered Medications:     dexamethasone (DECADRON) tablet 1 mg, 1 mg, Oral, Q12H, Jane Brenner APRN, 1 mg at 09/25/18 0752    miconazole (MICOTIN) 2 % powder, , Topical, BID, Vernell Davenport MD    HYDROcodone-acetaminophen (NORCO) 5-325 MG per tablet 1 tablet, 1 tablet, Oral, Q8H PRN, Vernell Davenport MD, 1 tablet at 09/18/18 2126    insulin lispro (HUMALOG) injection vial 8 Units, 8 Units, Subcutaneous, TID AC, Vernell Davenport MD, 8 Units at 09/25/18 0750    levETIRAcetam (KEPPRA) tablet 500 mg, 500 mg, Oral, BID, Vernell Davenport MD, 500 mg at 09/25/18 0752    LORazepam (ATIVAN) tablet 0.5 mg, 0.5 mg, Oral, Q8H PRN, Vernell Davenport MD, 0.5 mg at 09/16/18 2038    metFORMIN (GLUCOPHAGE) tablet 500 mg, 500 mg, Oral, BID WC, Vernell Davenport MD, 500 mg at 09/25/18 0751    pantoprazole (PROTONIX) tablet 40 mg, 40 mg, Oral, Daily, Vernell Davenport MD, 40 mg at 09/25/18 0752    pravastatin (PRAVACHOL) tablet 40 mg, 40 mg, Oral, Nightly, Vernell Davenport MD, 40 mg at 09/24/18 2056    sertraline (ZOLOFT) tablet 50 mg, 50 mg, Oral, Daily, Vernell Davenport MD, 50 mg at 09/25/18 0751    traZODone (DESYREL) tablet 50 mg, 50 mg, Oral, Nightly, Vernell Davenport MD, 50 mg at 09/24/18 2056    acetaminophen (TYLENOL) tablet 650 mg, 650 mg, Oral, Q4H PRN, Vernell Davenport MD    magnesium hydroxide (MILK OF MAGNESIA) 400 MG/5ML suspension 30 mL, 30 mL, Oral, Daily PRN, Brendalyn Sandhoff, MD, 30 mL at 09/16/18 1600    docusate sodium (COLACE) capsule 100 mg, 100 mg, Oral, BID PRN, Brendalyn Sandhoff, MD    bisacodyl (DULCOLAX) suppository 10 mg, 10 mg, Rectal, Daily PRN, Brendalyn Sandhoff, MD    insulin lispro (HUMALOG) injection vial 0-6 Units, 0-6 Units, Subcutaneous, TID WC, Brendalyn Sandhoff, MD, 1 Units at 09/24/18 1657    insulin lispro (HUMALOG) injection vial 0-3 Units, 0-3 Units, Subcutaneous, Nightly, Brendalyn Sandhoff, MD, 1 Units at 09/20/18 2130    glucose (GLUTOSE) 40 % oral gel 15 g, 15 g, Oral, PRN, Brendalyn Sandhoff, MD    dextrose 50 % solution 12.5 g, 12.5 g, Intravenous, PRN, Brendalyn Sandhoff, MD    glucagon (rDNA) injection 1 mg, 1 mg, Intramuscular, PRN, Brendalyn Sandhoff, MD    dextrose 5 % solution, 100 mL/hr, Intravenous, PRN, Brendalyn Sandhoff, MD    insulin glargine (LANTUS) injection vial 20 Units, 20 Units, Subcutaneous, Nightly, Brendalyn Sandhoff, MD, 20 Units at 09/24/18 2057    Allergies:  Patient has no known allergies. Social History:   TOBACCO:   reports that he has never smoked. He has never used smokeless tobacco.  ETOH:   reports that he does not drink alcohol. Family History:   Family History   Problem Relation Age of Onset    Diabetes Mother          PHYSICAL EXAM:  /62   Pulse 53   Temp 97 °F (36.1 °C)   Resp 18   Ht 6' (1.829 m)   Wt 173 lb 3.2 oz (78.6 kg)   SpO2 97%   BMI 23.49 kg/m²     Constitutional  well developed, well nourished. Eyes  conjunctiva normal.   Ear, nose, throat - No scars, masses, or lesions over external nose or ears, no atrophy of tongue  Neck-symmetric, no masses noted, no jugular vein distension  Respiration- chest wall appears symmetric, good expansion,   normal effort without use of accessory muscles  Musculoskeletal  no significant wasting of muscles noted, no bony deformities  Extremities-no clubbing, cyanosis or edema  Skin  warm, dry, and intact. No rash, erythema, or pallor.   Psychiatric 

## 2018-09-25 NOTE — PROGRESS NOTES
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)   Balance   Sitting Balance Supervision   Standing Balance Contact guard assistance   Standing Balance   Time 5 mins   Activity 1-2 hand vsisual scanning act   Sit to stand Stand by assistance   Stand to sit Stand by assistance   Functional Mobility   Functional - Mobility Device Rolling Walker   Activity To/from bathroom   Assist Level Contact guard assistance   Toilet Transfers   Toilet - Technique Ambulating   Toilet Transfer Contact guard assistance   Wheelchair Bed Transfers   Wheelchair/Bed - Technique Ambulating   Level of Asssistance Contact guard assistance   Assessment   Performance deficits / Impairments Decreased functional mobility ; Decreased ADL status; Decreased cognition;Decreased safe awareness;Decreased high-level IADLs;Decreased vision/visual deficit; Decreased balance   Timed Code Treatment Minutes 60 Minutes   Activity Tolerance   Activity Tolerance Patient Tolerated treatment well   Safety Devices   Safety Devices in place Yes   Type of devices Bed alarm in place;Call light within reach

## 2018-09-25 NOTE — CARE COORDINATION
Received call from Dr. Chapincito Rosenberg office- indicating Mr. Queen Snowden needs to be discharged today-Dr. Avila Brizuela (Neuro-Oncologist [facility]) has to reschedule from next week's appointment and wants to see tomorrow. I contacted Ms. Queen Snowden- very anxious but quickly trying to make arrangements-his nephew had agreed to drive them. She expresses fear about insulin instructions- particularly sliding scale as she has visual difficulty and traveling with medications. She agreed to appointment tomorrow at 4 pm.  Troy Stone, HETAL clarifying appropriate insulin based on expected Decadron instructions. Referral made to home health care- requesting nurse visit today, if possible, for medication teaching with insulin pen to simplify for Ms. Queen Snowden.

## 2018-09-25 NOTE — DISCHARGE SUMMARY
Occupational Therapy Discharge Summary         Date: 2018  Patient Name: Camilla Sellers        MRN: [id number]    : 1940  (80 y.o.)  Gender: male      Diagnosis: 9/10 RIGHT PARIETAL CRANIOTOMY FOR TUMOR EXCISION  Restrictions/Precautions  Restrictions/Precautions: Fall Risk      Discharge Date: 18    ADL Discharge FIM Scores:  Eatin - Patient feeds self  Groomin - Requires setup/cues to do all tasks  Bathin - Able to bathe 8-9 areas  Dressing-Upper: 4 - Requires assist with buttons/zippers only and/or requires assist with one arm only (assist to orient shirt )  Dressing-Lower: 4 - Requires assist with buttons/zippers/shoelaces and/or assist with shoes only (CGA)  Toiletin - Requires steadying assistance only (CGA)  Toilet Transfer: 4 - Requires steadying assistance only < 25% assist     Tub Transfer: 4 - Minimal contact assistance, pt. expends 75% or more effort (CGA to TTB)      Comprehension: 4 - Patient understands basic needs 75-90%+ of the time  Expression: 5 - Expresses basic ideas/needs only (hungry/hot/pain)  Social Interaction: 5 - Patient is appropriate with supervision/cues  Problem Solving: 3 - Patient solves simple/routine tasks 50%-74%  Memory: 4 - Patient remembers 75-90%+ of the time    UE Functioning:  WFLs    Home Management:  Functional Mobility  Functional - Mobility Device: RW recommended  Activity: To/from bathroom, Other  Assist Level: Contact guard assistance    Adaptive Equipment/DME Status:    Arranged wheelchair, has RW, BSC, and TTB         Pain Assessment:   None reported        Remaining Problems:  Decreased functional mobility ; Decreased ADL status; Decreased cognition; Decreased safe awareness; Decreased high-level IADLs; Decreased vision/visual deficit;  Decreased balance    STGs:  Short term goals  Time Frame for Short term goals: 1 week  Short term goal 1: complete UB dressing with supervision  Short term goal 2: complete LB dressing with

## 2018-09-25 NOTE — PROGRESS NOTES
Zackery Ellis  [id number]     09/25/18 1138 09/25/18 1139 09/25/18 1141   General   Response To Previous Treatment Patient with no complaints from previous session. --  --    Family / Caregiver Present Yes --  --    Subjective   Subjective Pt reports that he is really tired this morning, but agreed to therapy. --  --    Pain Screening   Patient Currently in Pain No --  --    Pain Assessment   Pain Level 0 --  --    Pre Treatment Pain Screening   Pain at present 0 --  --    Scale Used Numeric Score --  --    Intervention List Patient able to continue with treatment --  --    Bed Mobility   Rolling Independent --  --    Supine to Sit Independent --  --    Sit to Supine Independent --  --    Scooting Independent --  --    Transfers   Sit to Stand --  Contact guard assistance --    Stand to sit --  Contact guard assistance --    Bed to Chair --  Contact guard assistance --    Stand Pivot Transfers --  Contact guard assistance --    Ambulation   Ambulation? --  Yes --    Ambulation 1   Surface --  level tile --    Device --  Rolling Walker --    Assistance --  Contact guard assistance --    Quality of Gait --  Pt did not lean as much during amb this morning, but had difficulty w/ turning again. Pt kept RW too far in front of him despite cues.    --    Distance --  250' --    Wheelchair Activities   Left Leg Rest Level of Assistance --  Stand by assistance --    Right Leg Rest Level of Assistance --  Stand by assistance --    Left Brakes Level of Assistance --  Stand by assistance --    Right Brakes Level of Assistance --  Stand by Assist --    Propulsion --  Yes --    Propulsion 1   Propulsion --  Manual --    Level --  Level Tile --    Method --  RUE;LUE --    Level of Assistance --  Modified independent --    Description/ Details --  Pt propelled W/C well and showed proper turning. --    Distance --  200' --    Propulsion 2   Propulsion --  Manual --    Level --  Ramp --    Method --  RUE;LUE --    Level of Assistance

## 2018-09-25 NOTE — TELEPHONE ENCOUNTER
Katy Ely with [facility] called stating Dr. Tyrone Powell wants to admit this pt tonight to their services and just wanted to know if Dr. Butch Spicer would be ok with this. I called back and spoke with Katelynn Martinez

## 2018-09-25 NOTE — PROGRESS NOTES
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 After a few minutes able to sit without support. ADL   Additional Comments see FIMs   Functional Mobility   Functional - Mobility Device Rolling Walker   Assist Level Contact guard assistance   Toilet Transfers   Toilet - Technique Ambulating   Toilet Transfer Contact guard assistance   Shower Transfers   Shower - Transfer From [employer] - Transfer Type To and From   [employer] - Transfer To Transfer tub bench   Shower - Technique Ambulating   Shower Transfers Contact Guard   Short term goals   Short term goal 4 MET   Short term goal 6 MET   Assessment   Performance deficits / Impairments Decreased functional mobility ; Decreased ADL status; Decreased cognition;Decreased safe awareness;Decreased high-level IADLs;Decreased vision/visual deficit; Decreased balance   Timed Code Treatment Minutes 60 Minutes   Activity Tolerance   Activity Tolerance Patient Tolerated treatment well

## 2018-09-25 NOTE — DISCHARGE SUMMARY
Neurology Discharge Summary     Patient Identification:  Lemon Scheuermann is a 69 y.o. male. :  1940  Admit Date:  2018  Discharge date : 2018   Attending Provider: Gregg Lee MD     Account Number: [de-identified]                                   Admission Diagnoses:   Brain tumor [facility]) [D49.6]  Brain tumor [facility]) [D49.6]    Discharge Diagnoses:  Principal Problem:    Brain tumor [facility])  Active Problems:    Hypertension    Type 2 diabetes mellitus without complication ([facility] [address]    Gastroesophageal reflux disease without esophagitis    Chronic allergic rhinitis    Hyperlipidemia    Cognitive dysfunction - subacute    Gait disturbance    Anxiety    Lumbar disc disease  Resolved Problems:    * No resolved hospital problems. *      Discharge Medications:    Current Discharge Medication List           Details   miconazole (MICOTIN) 2 % powder Apply topically 2 times daily. Qty: 45 g, Refills: 1              Details   HYDROcodone-acetaminophen (NORCO) 5-325 MG per tablet Take 1 tablet by mouth every 8 hours as needed for Pain for up to 30 days. Lexx Alexus: 90 tablet, Refills: 0    Associated Diagnoses: Lumbar pain; Lumbar disc disease      LORazepam (ATIVAN) 0.5 MG tablet Take 1 tablet by mouth every 8 hours as needed for Anxiety for up to 30 days. Lexx Alexus: 90 tablet, Refills: 0    Associated Diagnoses: Anxiety      levETIRAcetam (KEPPRA) 500 MG tablet Take 1 tablet by mouth 2 times daily  Qty: 60 tablet, Refills: 0      sertraline (ZOLOFT) 50 MG tablet Take 1 tablet by mouth daily  Qty: 30 tablet, Refills: 0      traZODone (DESYREL) 50 MG tablet Take 1 tablet by mouth nightly  Qty: 30 tablet, Refills: 0      !! insulin lispro (HUMALOG) 100 UNIT/ML injection vial Inject 0-3 Units into the skin nightly  Qty: 1 vial, Refills: 0      !! insulin lispro (HUMALOG) 100 UNIT/ML injection vial Inject 0-6 Units into the skin 3 times daily (with meals)  Qty: 1 vial, Refills: 0      dexamethasone (DECADRON) 2 MG were neg. He was started on Keppra & IV Decadron. He is a diabetic and the steroids have been causing his glucose to run from the 600's to 300's. His A1C was 10.9 on 9/8/18. He was only on metforman at home but was started on Levemir 20 units nightly an a mod to high dose sliding scale insulin. On 9/10/18, he underwent a R parietal craniotomy w/ image guided stereotactic navigation. He tolerated the procedure well and was moved out of ICU on 9/12/18. His steroids are being weaned. He is alert and oriented, but does have decreased safety awareness and decreased awareness on the L. Pathology reports are not back at this time. He is now medically stable and is participating w/therapy. Patient was admitted to inpatient rehab. No acute issues. Oncology following for brain tumor with pathology returning as Glioblastoma. Decadron increased to 2 mg bid as per oncology for increased edema. Place on sliding scale with Humalog pen with wife to be instructed how to use by pharmacy. Plan DC home with home health. Follow up as scheduled. Discharge Instructions     Patient Instructions:   Home with [facility] orders: PT, OT and nursing   Discharge lab work: none  Code status: Full Code   Activity: activity as tolerated  Diet: DIET CARB CONTROL;     Wound Care: as directed  Equipment: as per therapy      David Sanders MD    At least 35 minutes were spent in discharging the patient

## 2018-09-25 NOTE — PROGRESS NOTES
[facility]  INPATIENT SPEECH THERAPY  [facility] [address] UN[address]  TIME    [id number]       [x]Daily Note  []Progress Note    Date: 2018  Patient Name: Demian Zee        MRN: [id number]    Account #: [de-identified]  : 1940  (80 y.o.)  Gender: male   Primary Provider: Dona العراقي MD  Precautions: Fall    Subjective: Patient completed clock drawing task this date to compare to previous sessions. Patient was given a bright strip of tape to help patient scan to the left. During the clock drawing task out of 4 clocks completed patient was able to complete 3/4 correctly with only a minimal error that was self corrected. This is significant improvement from previous dates. Patient completed visual cancellation task with numbers. Patient was to find numbers in order by scanning rows. Patient required moderate verbal cues to complete this task. Patient continuously losing track of where he was at secondary to decreased attention to task. Patient also requiring cues to scan all the way to the left. Patient was able to recall 5/5 events that have occurred. Patient does continue to have decreased utilization of safety precautions with ADL's and requires cues. Patient's overall short term recall has slightly improved during his stay. Patient is to discharge this date with home health and his family. SHORT TERM GOAL #1:  Goal 1: Patient will complete visual scanning tasks to improve safety for functional ADL's by completing cancellation, tracking, reading, etc tasks with 100% accuracy. not met    SHORT TERM GOAL #2:  Goal 2: Patient will complete orientation tasks (time, place, biographical information) with 100% accuracy given minimal verbal cues and prompts. MET    SHORT TERM GOAL #3:  Goal 3: Patient will recall 5 facts at paragraph level reading to improve short term recall with minimal prompts/verbal cues.  not met    SHORT TERM GOAL #4:  Goal 4: Patient will recall/utilize safety precautions to

## 2018-09-26 NOTE — TELEPHONE ENCOUNTER
Pt was set up for a renal ultrasound to follow up on a renal mass. This was cancelled because he went into the hospital. Does he still need this done?

## 2018-09-26 NOTE — DISCHARGE SUMMARY
assistance  Comment: Pt amb well up and down stairs. FIM SCORES    CURRENT  Bed, Chair, Wheel Chair: 4 - Requires steadying assistance only <25% assist  and/or requires assist with one leg only  Walk: 4 - Contact Guard/Minimal Assistance Requires up to Contact Guard or Minimal Assistance to walk at least 150 feet  Wheel Chair: 6 - Modified [place] Operates wheelchair at least 150 feet with an ambulatory device, orthosis or prosthesis OR requires extra amount of time OR there is concern for safety  Stairs: 2- Maximal Assistance Performs 25-49% of the effort to go up and down 4 to 6 stairs and requires the assistance of one person only  ADMIT  Bed, Chair, Wheel Chair: 3 - Requires 25-49% assistance to transfer   Walk: 1 - Total Assistance Walks < 50 feet OR requires two or more people OR patient performs < 25% of locomotion effort   Wheel Chair: 1 - Total Assistance Operates wheelchair < 50 feet OR requires two or more people OR patient performs < 25% of locomotion effort   Stairs: 0 - Activity Does not Occur ( 0 only for the admission assessment)      GOALS  GOAL: Bed, Chair, Wheel Chair: 6  GOAL: Walk/Wheel Chair: 6  GOAL: Stairs: 5     GOALS:  Short term goals  Time Frame for Short term goals: 2 WEEKS  Short term goal 1: TF FIM 4  Short term goal 2: AMB FIM 4  Short term goal 3: WC FIM 5  Short term goal 4: STEP FIM 1  Short term goal 5: HEP SBA    Long term goals  Time Frame for Long term goals : 4 WEEKS  Long term goal 1: TF FIM 6  Long term goal 2: AMB FIM 6  Long term goal 3: WC FIM 6  Long term goal 4: STEP FIM 5E  Long term goal 5: HEP INDEP  HOME LIVING:  Lives With: Spouse  Type of Home: House  Home Layout: Two level           ASSESSMENT (IMPAIRMENTS/BARRIERS): Body structures, Functions, Activity limitations: Decreased functional mobility , Decreased safe awareness, Decreased cognition, Decreased endurance  Assessment: Pt seemed to be more fatigued this morning.   Pt propelled W/C on level tile, up

## 2018-09-27 PROBLEM — R68.89 FORGETFULNESS: Status: ACTIVE | Noted: 2018-01-01

## 2018-09-27 PROBLEM — Z78.9 NON-SMOKER: Status: ACTIVE | Noted: 2018-01-01

## 2018-09-27 PROBLEM — C71.9 GLIOBLASTOMA MULTIFORME OF BRAIN (HCC): Status: ACTIVE | Noted: 2018-01-01

## 2018-09-27 PROBLEM — R26.81 UNSTEADY GAIT: Status: ACTIVE | Noted: 2018-01-01

## 2018-09-27 NOTE — PROGRESS NOTES
SUBJECTIVE:  Patient ID: Dwight Cobian is a 78 y.o. male is here today for follow-up.    Chief Complaint:brain tumor  Chief Complaint   Patient presents with   • Brain Tumor     patient underwent RT crani for tumor on 9/10/2018 and is here today to discuss the pathology results.       HPI  78-year-old gentleman went to the operating room for a right craniotomy for glioblastoma on 09/10/2018.  He did a brief stay in rehabilitation at [facility].  His wife said that he did very well there and has improved a lot.  He has been seen by Dr. Orta's office and has actually been seen by Dr. samuel at [place] already.  He is scheduled for consultation with Dr. Sequeira in the near future.  The family is concerned about some of the instructions regarding his insulin management.  Otherwise he denies any headache no nausea no vomiting.  He is walking with a walker or cane at home.    The following portions of the patient's history were reviewed and updated as appropriate: allergies, current medications, past family history, past medical history, past social history, past surgical history and problem list.    OBJECTIVE:    Review of Systems   Neurological: Positive for headaches.   All other systems reviewed and are negative.         Physical Exam   Constitutional: He is oriented to person, place, and time.   Neurological: He is oriented to person, place, and time.   Psychiatric: His speech is normal.       Neurologic Exam     Mental Status   Oriented to person, place, and time.   Attention: normal.   Speech: speech is normal   Level of consciousness: alert    Cranial Nerves   Cranial nerves II through XII intact.     Motor Exam   Muscle bulk: normal  Overall muscle tone: normal  Right arm pronator drift: absent  Left arm pronator drift: absent    Sensory Exam   Light touch normal.   Pinprick normal.     Gait, Coordination, and Reflexes     Gait  Gait: (Ataxic at times and unsteady)    Tremor   Resting tremor:  absent  Intention tremor: absent  Action tremor: absent    Reflexes   Reflexes 2+ except as noted.       Independent Review of Radiographic Studies:       ASSESSMENT/PLAN:  We explained the diagnosis of glioblastoma to the patient and his family.  They are following up with oncology and radiation oncology for the next steps in his treatment.  We will see him in follow-up in about 11 weeks for a repeat MRI.  He is getting at home health physical therapy for his gait and balance.  He is also to see his primary care doctor regarding his blood sugars and his insulin management in the next day or 2.  Their questions concerns were addressed.      1. Glioblastoma multiforme of brain (CMS/HCC)    2. Unsteady gait    3. Non-smoker    4. Normal body mass index (BMI)            No Follow-up on file.      Mika Ervin MD

## 2018-09-27 NOTE — DISCHARGE SUMMARY
[facility]                      Inpatient Speech Therapy                Discharge Summary      Date: 2018  Patient Name: Alex Rowan        MRN: [id number]    Account #: [de-identified]  : 1940  (80 y.o.)  Gender: male     PATIENT DIAGNOSIS(ES):    Diagnosis: 9/10 RIGHT PARIETAL CRANIOTOMY FOR TUMOR EXCISION    History of present illness: This 69 y. o. male pt admitted to [facility] on 18 from Dr. Elysia Anand office. Pt had been referred to Dr. Luis Angel Ruiz by his PCP, Dr. [facility]. Pt had presented at Dr. Chanel Espinal office w/1 month hx of lowback pain, dizziness and per pt's wife, processing and cognitive issues. He also complained that his L leg wasn't functioning properly causing several LOB's, but was able to catch himself before falling. 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 He was admitted to the hospital for further evaluation. Upon admission he did have some agitation and confusion. CT's of abd/pelvis and chest were done  and were neg. He was started on Keppra & IV Anamika Grjeling is a diabetic and the steroids have been causing his glucose to run from the 600's to 300's. His A1C was 10.9 on 18. He was only on metforman at home but was started on Levemir 20 units nightly an a mod to high dose sliding scale insulin. On 9/10/18, he underwent a R parietal craniotomy w/ image guided stereotactic navigation. He tolerated the procedure well and was moved out of ICU on 18. His steroids are being weaned. He is alert and oriented, but does have decreased safety awareness and decreased awareness on the L. Pathology reports are not back at this time. Georges Mcdaniel is now medically stable and is participating w/therapy. Aftab Atkinson was admitted to inpatient rehab. No acute issues.  Oncology following for brain tumor with pathology returning

## 2018-09-27 NOTE — PATIENT INSTRUCTIONS
PATIENT TO CONTINUE TO FOLLOW UP WITH HIS PRIMARY CARE PROVIDER FOR YEARLY PHYSICAL EXAMS TO ENSURE COMPLETE HEALTH MAINTENANCE        PATIENT TO BEGIN TAPERING THE DECADRON AS DIRECT BY DR HALL  PATIENT CAN STOP THE KEPPRA AS OF TODAY PER DR HALL

## 2018-09-28 NOTE — PROGRESS NOTES
Post-Discharge Transitional Care Management Services      Derrick Thornton   YOB: 1940    Date of Visit:  9/28/2018  30 Day Post-Discharge Date: October 25, 2018    No Known Allergies  Outpatient Prescriptions Marked as Taking for the 9/28/18 encounter (Office Visit) with Jaja Wyatt MD   Medication Sig Dispense Refill    sertraline (ZOLOFT) 50 MG tablet Take 1 tablet by mouth daily 30 tablet 0    traZODone (DESYREL) 50 MG tablet Take 1 tablet by mouth nightly 30 tablet 0    miconazole (MICOTIN) 2 % powder Apply topically 2 times daily. 45 g 1    dexamethasone (DECADRON) 2 MG tablet Take 1 tablet by mouth 2 times daily (with meals) (Patient taking differently: Take 2 mg by mouth daily (with breakfast) ) 60 tablet 0    metFORMIN (GLUCOPHAGE) 500 MG tablet TAKE 1 TABLET IN THE MORNING & 2 TABLETS IN THE EVENING. 270 tablet 0    pravastatin (PRAVACHOL) 40 MG tablet Take 1 tablet by mouth nightly 90 tablet 3         Vitals:    09/28/18 1012   BP: (!) 118/50   Pulse: 64   SpO2: 98%   Weight: 168 lb (76.2 kg)   Height: 6' (1.829 m)     Body mass index is 22.78 kg/m². Wt Readings from Last 3 Encounters:   09/28/18 168 lb (76.2 kg)   09/22/18 173 lb 3.2 oz (78.6 kg)   09/05/18 175 lb 9.6 oz (79.7 kg)     BP Readings from Last 3 Encounters:   09/28/18 (!) 118/50   09/25/18 120/62   09/05/18 (!) 188/90        Patient was admitted to [facility] from September 14, 2018 to September 25, 2018 for glioblastoma. HPI:  46-year-old patient of Dr. Daina Ho for hospital follow-up visit. He started having some problems with balance and found out to have a glioblastoma. Dr. Derl Eisenmenger removed it. He went to [facility] rehab for some physical therapy and inpatient rehab for a few days and now is out and has home health ordered for rehab.  He saw neurosurgeon and [facility] yesterday for a 2nd opinion they are going to proceed with radiation treatments on October 10 with her initial appointment with radiation oncologist here in [facility]. She is with his blood sugar go on Adderall the steroids that he was on and his wife is here and she's got a lot of questions about his insulin. Inpatient course: Discharge summary reviewed- see chart. Current status: Stable    Review of Systems:  Review of Systems   Constitutional: Positive for fatigue. Negative for activity change, appetite change and fever. HENT: Negative for congestion, hearing loss, sinus pressure, sore throat and trouble swallowing. Eyes: Negative for discharge and itching. Respiratory: Negative for shortness of breath and wheezing. Cardiovascular: Negative for chest pain, palpitations and leg swelling. Gastrointestinal: Negative for abdominal distention, abdominal pain, blood in stool, nausea and vomiting. Endocrine: Negative for cold intolerance, heat intolerance and polydipsia. Genitourinary: Negative for flank pain, frequency, hematuria and urgency. Musculoskeletal: Negative for arthralgias, back pain and joint swelling. Skin: Negative for rash and wound. Allergic/Immunologic: Negative for environmental allergies and food allergies. Neurological: Positive for weakness. Negative for dizziness, tremors, syncope, numbness and headaches. Hematological: Negative for adenopathy. Psychiatric/Behavioral: Negative for agitation and hallucinations. The patient is not nervous/anxious. Physical Exam:  Physical Exam   Constitutional: He is oriented to person, place, and time. He appears well-developed and well-nourished. No distress. HENT:   Head: Normocephalic and atraumatic. Right Ear: External ear normal.   Left Ear: External ear normal.   Nose: Nose normal.   Mouth/Throat: Oropharynx is clear and moist. No oropharyngeal exudate. Eyes: Pupils are equal, round, and reactive to light. Conjunctivae and EOM are normal. Right eye exhibits no discharge. Left eye exhibits no discharge. No scleral icterus.    Neck: Normal

## 2018-10-09 PROBLEM — Z71.9 ENCOUNTER FOR CONSULTATION: Status: ACTIVE | Noted: 2018-01-01

## 2018-10-10 PROBLEM — Z78.9 DECREASED INDEPENDENCE WITH ACTIVITIES OF DAILY LIVING: Status: ACTIVE | Noted: 2018-01-01

## 2018-10-10 PROBLEM — Z98.890 S/P CRANIOTOMY: Status: ACTIVE | Noted: 2018-01-01

## 2018-10-10 NOTE — TELEPHONE ENCOUNTER
Pt wife wants to know if he can go off the insulin and go back on the Metformin? The cardiology nurse called today while they were there for an appointment. They deferred this question to you. We need to call the patient's wife with information. [phone]. [phone].

## 2018-10-11 NOTE — PROGRESS NOTES
This patient was consult with yesterday for consideration of palliative radiotherapy for glioblastoma.    This time his wife is called back to inform us they have elected no therapy and prefer hospice care with comfort measures.    We will facilitate referral to hospice.

## 2018-10-16 NOTE — TELEPHONE ENCOUNTER
The patient has a cancer policy and his insurance is needing a letter from the attending physician to complete this claim. They need to know who to send this to and our asking if Dr. Aamir Ramirez could clarify this for them. They are looking at Dr. Joy Culp, or Aamir Ramirez. They would like for us to call Mrs. Con Yeh when we know where they need to send the paperwork. [phone].

## 2018-10-22 PROBLEM — G93.40 ENCEPHALOPATHY: Chronic | Status: ACTIVE | Noted: 2018-01-01

## 2018-10-22 PROBLEM — C71.9 MALIGNANT NEOPLASM OF BRAIN (HCC): Status: ACTIVE | Noted: 2018-01-01

## 2023-05-05 NOTE — PROGRESS NOTES
Continued Stay Note   Maryam     Patient Name: Dwight Cobian  MRN: [id number]  Today's Date: 9/12/2018    Admit Date: 9/6/2018          Discharge Plan     Row Name 09/12/18 1501       Plan    Plan  did speak with patient regarding rehab.   has been attempting to reach wife to discuss with her and to receive consent to proceed with referrals.              Discharge Codes    No documentation.           VIOLET Monterroso     Fax confirmation received
